# Patient Record
Sex: FEMALE | Race: OTHER | Employment: OTHER | URBAN - METROPOLITAN AREA
[De-identification: names, ages, dates, MRNs, and addresses within clinical notes are randomized per-mention and may not be internally consistent; named-entity substitution may affect disease eponyms.]

---

## 2017-01-13 ENCOUNTER — LAB ENCOUNTER (OUTPATIENT)
Dept: LAB | Age: 82
End: 2017-01-13
Attending: INTERNAL MEDICINE
Payer: MEDICARE

## 2017-01-13 ENCOUNTER — PRIOR ORIGINAL RECORDS (OUTPATIENT)
Dept: OTHER | Age: 82
End: 2017-01-13

## 2017-01-13 DIAGNOSIS — E78.00 PURE HYPERCHOLESTEROLEMIA: ICD-10-CM

## 2017-01-13 DIAGNOSIS — I10 ESSENTIAL HYPERTENSION: Primary | ICD-10-CM

## 2017-01-13 LAB
ALBUMIN SERPL-MCNC: 3.7 G/DL (ref 3.5–4.8)
ALP LIVER SERPL-CCNC: 116 U/L (ref 55–142)
ALT SERPL-CCNC: 17 U/L (ref 14–54)
AST SERPL-CCNC: 19 U/L (ref 15–41)
BILIRUB SERPL-MCNC: 0.7 MG/DL (ref 0.1–2)
BUN BLD-MCNC: 22 MG/DL (ref 8–20)
CALCIUM BLD-MCNC: 8.9 MG/DL (ref 8.3–10.3)
CHLORIDE: 106 MMOL/L (ref 101–111)
CHOLEST SMN-MCNC: 164 MG/DL (ref ?–200)
CO2: 28 MMOL/L (ref 22–32)
CREAT BLD-MCNC: 1.06 MG/DL (ref 0.55–1.02)
GLUCOSE BLD-MCNC: 87 MG/DL (ref 70–99)
HDLC SERPL-MCNC: 55 MG/DL (ref 45–?)
HDLC SERPL: 2.98 {RATIO} (ref ?–4.44)
LDLC SERPL CALC-MCNC: 79 MG/DL (ref ?–130)
M PROTEIN MFR SERPL ELPH: 7.2 G/DL (ref 6.1–8.3)
NONHDLC SERPL-MCNC: 109 MG/DL (ref ?–130)
POTASSIUM SERPL-SCNC: 3.4 MMOL/L (ref 3.6–5.1)
SODIUM SERPL-SCNC: 143 MMOL/L (ref 136–144)
TRIGLYCERIDES: 149 MG/DL (ref ?–150)
VLDL: 30 MG/DL (ref 5–40)

## 2017-01-13 PROCEDURE — 80053 COMPREHEN METABOLIC PANEL: CPT | Performed by: INTERNAL MEDICINE

## 2017-01-13 PROCEDURE — 80061 LIPID PANEL: CPT | Performed by: INTERNAL MEDICINE

## 2017-01-13 PROCEDURE — 36415 COLL VENOUS BLD VENIPUNCTURE: CPT | Performed by: INTERNAL MEDICINE

## 2017-01-21 ENCOUNTER — HOSPITAL ENCOUNTER (OUTPATIENT)
Dept: MAMMOGRAPHY | Age: 82
Discharge: HOME | End: 2017-01-21
Attending: FAMILY MEDICINE
Payer: MEDICARE

## 2017-01-21 DIAGNOSIS — Z12.31 VISIT FOR SCREENING MAMMOGRAM: ICD-10-CM

## 2017-01-21 PROCEDURE — 77063 BREAST TOMOSYNTHESIS BI: CPT

## 2017-01-21 PROCEDURE — 77067 SCR MAMMO BI INCL CAD: CPT

## 2017-01-24 ENCOUNTER — PRIOR ORIGINAL RECORDS (OUTPATIENT)
Dept: OTHER | Age: 82
End: 2017-01-24

## 2017-01-26 LAB
ALBUMIN: 3.7 G/DL
ALKALINE PHOSPHATATE(ALK PHOS): 116 IU/L
BILIRUBIN TOTAL: 0.7 MG/DL
BUN: 22 MG/DL
CALCIUM: 8.9 MG/DL
CHLORIDE: 106 MEQ/L
CHOLESTEROL, TOTAL: 164 MG/DL
CREATININE, SERUM: 1.06 MG/DL
GLUCOSE: 87 MG/DL
HDL CHOLESTEROL: 55 MG/DL
LDL CHOLESTEROL: 79 MG/DL
POTASSIUM, SERUM: 3.4 MEQ/L
PROTEIN, TOTAL: 7.2 G/DL
SGOT (AST): 19 IU/L
SGPT (ALT): 17 IU/L
SODIUM: 143 MEQ/L
TRIGLYCERIDES: 149 MG/DL

## 2017-02-03 ENCOUNTER — TELEPHONE (OUTPATIENT)
Dept: FAMILY MEDICINE CLINIC | Facility: CLINIC | Age: 82
End: 2017-02-03

## 2017-02-03 ENCOUNTER — HOSPITAL ENCOUNTER (OUTPATIENT)
Dept: MAMMOGRAPHY | Facility: HOSPITAL | Age: 82
Discharge: HOME | End: 2017-02-03
Attending: FAMILY MEDICINE
Payer: MEDICARE

## 2017-02-03 DIAGNOSIS — R92.8 ABNORMAL MAMMOGRAM OF RIGHT BREAST: ICD-10-CM

## 2017-02-03 DIAGNOSIS — R92.1 BREAST CALCIFICATIONS ON MAMMOGRAM: Primary | ICD-10-CM

## 2017-02-03 PROCEDURE — 77065 DX MAMMO INCL CAD UNI: CPT

## 2017-02-03 NOTE — IMAGING NOTE
Assisted Dr. Ramiro Cuba with recommendation for a sterotactic breast biopsy for calcifications. Emotional and educational support provided. Written information provided to Christiano Pittman.  Pt verbalized understanding and had no further questions at this ti

## 2017-02-08 ENCOUNTER — HOSPITAL ENCOUNTER (OUTPATIENT)
Dept: MAMMOGRAPHY | Facility: HOSPITAL | Age: 82
Discharge: HOME | End: 2017-02-08
Attending: FAMILY MEDICINE
Payer: MEDICARE

## 2017-02-08 DIAGNOSIS — R92.1 BREAST CALCIFICATIONS ON MAMMOGRAM: ICD-10-CM

## 2017-02-08 PROCEDURE — 88305 TISSUE EXAM BY PATHOLOGIST: CPT | Performed by: FAMILY MEDICINE

## 2017-02-08 PROCEDURE — 88344 IMHCHEM/IMCYTCHM EA MLT ANTB: CPT | Performed by: FAMILY MEDICINE

## 2017-02-08 PROCEDURE — 19081 BX BREAST 1ST LESION STRTCTC: CPT

## 2017-02-14 ENCOUNTER — TELEPHONE (OUTPATIENT)
Dept: MAMMOGRAPHY | Facility: HOSPITAL | Age: 82
End: 2017-02-14

## 2017-02-14 NOTE — TELEPHONE ENCOUNTER
Call placed to pt home number at this time, Noemi Garrido verified name and . Noemi Garrido notified of normal breast biopsy result at this time, negative for atypia or malignancy. Pt should f/u in 6 months for surveillance.  [address] [facility] [place]

## 2017-03-01 ENCOUNTER — OFFICE VISIT (OUTPATIENT)
Dept: FAMILY MEDICINE CLINIC | Facility: CLINIC | Age: 82
End: 2017-03-01

## 2017-03-01 VITALS
HEART RATE: 56 BPM | RESPIRATION RATE: 16 BRPM | DIASTOLIC BLOOD PRESSURE: 64 MMHG | HEIGHT: 61.5 IN | SYSTOLIC BLOOD PRESSURE: 126 MMHG | TEMPERATURE: 98 F | BODY MASS INDEX: 32.24 KG/M2 | WEIGHT: 173 LBS

## 2017-03-01 DIAGNOSIS — L21.9 SEBORRHEIC DERMATITIS OF SCALP: ICD-10-CM

## 2017-03-01 DIAGNOSIS — S83.521A SPRAIN OF POSTERIOR CRUCIATE LIGAMENT OF RIGHT KNEE, INITIAL ENCOUNTER: Primary | ICD-10-CM

## 2017-03-01 PROCEDURE — 99213 OFFICE O/P EST LOW 20 MIN: CPT | Performed by: FAMILY MEDICINE

## 2017-03-01 RX ORDER — CLOBETASOL PROPIONATE 0.05 G/100ML
SHAMPOO TOPICAL
Qty: 240 ML | Refills: 0 | Status: SHIPPED | OUTPATIENT
Start: 2017-03-01 | End: 2017-05-18

## 2017-03-01 NOTE — PROGRESS NOTES
Slipped on black ice in late December. Legs ended up hyperextended as she fell. Used her back to keep her head from hitting the ground. Was able to pick herself up using the bumper of a car. Drove herself home.   Has been suffering with some pain in the Oral Tab Take 1 tablet (40 mg total) by mouth nightly. Disp: 90 tablet Rfl: 3   hydrochlorothiazide 25 MG Oral Tab Take 1 tablet (25 mg total) by mouth daily.  Disp: 90 tablet Rfl: 3     ALLERGIES:   Amoxicillin; Codeine; Demerol; Zetia  FAMILY HISTORY  No

## 2017-03-01 NOTE — PATIENT INSTRUCTIONS
Posterior cruciate ligament strain after the fall. Continue with exercises. If not better in another month, we could consider MRI. Unlikely to be Baker's cyst as you do not have any swelling or lump in the back of the knee.     use shampoo daily for the

## 2017-03-02 ENCOUNTER — TELEPHONE (OUTPATIENT)
Dept: FAMILY MEDICINE CLINIC | Facility: CLINIC | Age: 82
End: 2017-03-02

## 2017-03-03 ENCOUNTER — TELEPHONE (OUTPATIENT)
Dept: FAMILY MEDICINE CLINIC | Facility: CLINIC | Age: 82
End: 2017-03-03

## 2017-03-03 RX ORDER — CLOBETASOL PROPIONATE 0.46 MG/ML
1 SOLUTION TOPICAL 2 TIMES DAILY
Qty: 50 ML | Refills: 0 | Status: SHIPPED | OUTPATIENT
Start: 2017-03-03 | End: 2018-06-26 | Stop reason: ALTCHOICE

## 2017-03-03 NOTE — TELEPHONE ENCOUNTER
PA denied for Clobetasol shampoo as pt's insurance requires failure on 2 other drugs from preferred list or clobetasol 0.05% in cream,gel,ointment or solution. ..change form?

## 2017-05-13 ENCOUNTER — OFFICE VISIT (OUTPATIENT)
Dept: FAMILY MEDICINE CLINIC | Facility: CLINIC | Age: 82
End: 2017-05-13

## 2017-05-13 VITALS
TEMPERATURE: 98 F | HEART RATE: 76 BPM | HEIGHT: 61.5 IN | OXYGEN SATURATION: 98 % | BODY MASS INDEX: 32.24 KG/M2 | WEIGHT: 173 LBS | RESPIRATION RATE: 20 BRPM | SYSTOLIC BLOOD PRESSURE: 124 MMHG | DIASTOLIC BLOOD PRESSURE: 66 MMHG

## 2017-05-13 DIAGNOSIS — R09.82 POST-NASAL DRIP: ICD-10-CM

## 2017-05-13 DIAGNOSIS — B37.0 ORAL CANDIDA: ICD-10-CM

## 2017-05-13 DIAGNOSIS — J02.9 SORE THROAT: Primary | ICD-10-CM

## 2017-05-13 DIAGNOSIS — R05.9 COUGH: ICD-10-CM

## 2017-05-13 PROCEDURE — 99213 OFFICE O/P EST LOW 20 MIN: CPT | Performed by: NURSE PRACTITIONER

## 2017-05-13 PROCEDURE — 87880 STREP A ASSAY W/OPTIC: CPT | Performed by: NURSE PRACTITIONER

## 2017-05-13 RX ORDER — BENZONATATE 200 MG/1
200 CAPSULE ORAL 3 TIMES DAILY PRN
Qty: 30 CAPSULE | Refills: 0 | Status: SHIPPED | OUTPATIENT
Start: 2017-05-13 | End: 2017-05-23

## 2017-05-13 NOTE — PROGRESS NOTES
HPI:   Sandy Chase is a 81year old female who presents with ill symptoms for  4  days.  Patient reports sore throat, congestion, fever with Tmax to 99.2, dry cough, cough is keeping pt up at night, OTC cold meds have not been helping, tried Flonase fo Alcohol Use: Yes                Comment: socially only on Holidays        REVIEW OF SYSTEMS:   GENERAL: feels well otherwise, fatigued with lack of sleep  HEENT:  as above in HPI  LUNGS: denies shortness of breath with exertion, cough non productive  CARDI fullness and post nasal drip. · Start Nystatin swish and swallow-use fifteen minutes before meals and use at bedtime AFTER brushing teeth. · If Flonase is used, rinse mouth-avoid if bothersome as this is worsening nasal dryness.  May use Saline nasal spr

## 2017-05-13 NOTE — PATIENT INSTRUCTIONS
· Please start Benzonatate (Tessalon) capsule three times daily as needed for cough. Do not break or puncture capsule. Take with full glass of water as needed. If not working for cough may stop after three or four doses.   · Start Allegra (single ingredient

## 2017-05-17 PROBLEM — I77.9 CAROTID ARTERY DISEASE: Status: ACTIVE | Noted: 2017-05-17

## 2017-05-17 PROBLEM — I77.9 CAROTID ARTERY DISEASE (HCC): Status: ACTIVE | Noted: 2017-05-17

## 2017-05-18 ENCOUNTER — OFFICE VISIT (OUTPATIENT)
Dept: FAMILY MEDICINE CLINIC | Facility: CLINIC | Age: 82
End: 2017-05-18

## 2017-05-18 VITALS
DIASTOLIC BLOOD PRESSURE: 70 MMHG | OXYGEN SATURATION: 100 % | BODY MASS INDEX: 32.24 KG/M2 | RESPIRATION RATE: 18 BRPM | SYSTOLIC BLOOD PRESSURE: 130 MMHG | WEIGHT: 173 LBS | HEART RATE: 55 BPM | TEMPERATURE: 98 F | HEIGHT: 61.5 IN

## 2017-05-18 DIAGNOSIS — B37.0 THRUSH, ORAL: ICD-10-CM

## 2017-05-18 DIAGNOSIS — J06.9 VIRAL UPPER RESPIRATORY TRACT INFECTION: Primary | ICD-10-CM

## 2017-05-18 PROCEDURE — 99213 OFFICE O/P EST LOW 20 MIN: CPT | Performed by: FAMILY MEDICINE

## 2017-05-19 NOTE — PROGRESS NOTES
Here to follow-up from the urgent care. Saturday she was started on Tessalon Perles for cough secondary to postnasal drip. She has an effusion in her ear. She still feels like there is fluid there.   She was diagnosed with oral thrush likely related to t Wt 173 lb  BMI 32.16 kg/m2  SpO2 100%    Alert no acute distress. Ear canals and tympanic membranes are clear. Nose is clear. Mouth no erythema. No plaques on the tongue. Neck without lymphadenopathy. Lungs are clear.     Assessment URI causing cough

## 2017-05-26 ENCOUNTER — TELEPHONE (OUTPATIENT)
Dept: FAMILY MEDICINE CLINIC | Facility: CLINIC | Age: 82
End: 2017-05-26

## 2017-05-26 ENCOUNTER — HOSPITAL ENCOUNTER (OUTPATIENT)
Dept: GENERAL RADIOLOGY | Age: 82
Discharge: HOME | End: 2017-05-26
Attending: FAMILY MEDICINE
Payer: MEDICARE

## 2017-05-26 DIAGNOSIS — M25.561 ACUTE PAIN OF RIGHT KNEE: Primary | ICD-10-CM

## 2017-05-26 DIAGNOSIS — N60.12 FIBROCYSTIC BREAST CHANGES OF BOTH BREASTS: ICD-10-CM

## 2017-05-26 DIAGNOSIS — M17.11 PRIMARY OSTEOARTHRITIS OF RIGHT KNEE: Primary | ICD-10-CM

## 2017-05-26 DIAGNOSIS — M25.561 ACUTE PAIN OF RIGHT KNEE: ICD-10-CM

## 2017-05-26 DIAGNOSIS — N60.11 FIBROCYSTIC BREAST CHANGES OF BOTH BREASTS: ICD-10-CM

## 2017-05-26 PROCEDURE — 73560 X-RAY EXAM OF KNEE 1 OR 2: CPT | Performed by: FAMILY MEDICINE

## 2017-05-26 RX ORDER — NAPROXEN 500 MG/1
500 TABLET ORAL 2 TIMES DAILY
Qty: 28 TABLET | Refills: 0 | Status: SHIPPED | OUTPATIENT
Start: 2017-05-26 | End: 2018-11-15 | Stop reason: ALTCHOICE

## 2017-05-26 NOTE — TELEPHONE ENCOUNTER
Left message on machine. X-rays show significant osteoarthritis. I have sent the medication to her pharmacy. I will see her in follow-up next week. At that time we can discuss supplements such as glucosamine/chondroitin for long-term relief.

## 2017-05-26 NOTE — TELEPHONE ENCOUNTER
Here with her  for his visit. Knee pain radiating down the shin. X-ray ordered. Mammogram ordered for six-month follow-up. This is to be done in July. She will make a follow-up visit for the knee next week.

## 2017-05-30 RX ORDER — NAPROXEN 500 MG/1
TABLET ORAL
Qty: 180 TABLET | Refills: 0 | OUTPATIENT
Start: 2017-05-30

## 2017-06-02 ENCOUNTER — OFFICE VISIT (OUTPATIENT)
Dept: FAMILY MEDICINE CLINIC | Facility: CLINIC | Age: 82
End: 2017-06-02

## 2017-06-02 VITALS
HEIGHT: 61.5 IN | WEIGHT: 172 LBS | BODY MASS INDEX: 32.06 KG/M2 | DIASTOLIC BLOOD PRESSURE: 78 MMHG | OXYGEN SATURATION: 98 % | RESPIRATION RATE: 20 BRPM | HEART RATE: 79 BPM | SYSTOLIC BLOOD PRESSURE: 130 MMHG

## 2017-06-02 DIAGNOSIS — M17.11 PRIMARY OSTEOARTHRITIS OF RIGHT KNEE: Primary | ICD-10-CM

## 2017-06-02 PROCEDURE — 99213 OFFICE O/P EST LOW 20 MIN: CPT | Performed by: FAMILY MEDICINE

## 2017-06-02 RX ORDER — MAGNESIUM HYDROXIDE 400 MG/5ML
SUSPENSION, ORAL (FINAL DOSE FORM) ORAL
Qty: 60 CAPSULE | Refills: 0 | COMMUNITY
Start: 2017-06-02 | End: 2017-11-07

## 2017-06-02 NOTE — PATIENT INSTRUCTIONS
Exercise will help to keep the arthritis pain at bay. You are going to try glucosamine and chondroitin 500 mg twice a day. Reassess the pain in 2 months. Continue with helping. Go ahead and use the naproxen as needed.     Treatment options include s

## 2017-06-02 NOTE — PROGRESS NOTES
At her 's visit last week we discussed arthritis of the knee. X-ray was performed and confirms moderate to severe osteoarthritis.   We reviewed the x-rays this afternoon which show scar tissue in the medial compartment and a large spur in the latera

## 2017-07-28 ENCOUNTER — LAB ENCOUNTER (OUTPATIENT)
Dept: LAB | Age: 82
End: 2017-07-28
Attending: INTERNAL MEDICINE
Payer: MEDICARE

## 2017-07-28 ENCOUNTER — PRIOR ORIGINAL RECORDS (OUTPATIENT)
Dept: OTHER | Age: 82
End: 2017-07-28

## 2017-07-28 DIAGNOSIS — R94.31 NONSPECIFIC ABNORMAL ELECTROCARDIOGRAM (ECG) (EKG): ICD-10-CM

## 2017-07-28 DIAGNOSIS — I10 ESSENTIAL HYPERTENSION, MALIGNANT: Primary | ICD-10-CM

## 2017-07-28 DIAGNOSIS — E78.00 PURE HYPERCHOLESTEROLEMIA: ICD-10-CM

## 2017-07-28 LAB
ALBUMIN SERPL-MCNC: 3.4 G/DL (ref 3.5–4.8)
ALP LIVER SERPL-CCNC: 93 U/L (ref 55–142)
ALT SERPL-CCNC: 17 U/L (ref 14–54)
AST SERPL-CCNC: 15 U/L (ref 15–41)
BILIRUB SERPL-MCNC: 0.7 MG/DL (ref 0.1–2)
BUN BLD-MCNC: 24 MG/DL (ref 8–20)
CALCIUM BLD-MCNC: 8.9 MG/DL (ref 8.3–10.3)
CHLORIDE: 106 MMOL/L (ref 101–111)
CHOLEST SMN-MCNC: 166 MG/DL (ref ?–200)
CO2: 29 MMOL/L (ref 22–32)
CREAT BLD-MCNC: 1.02 MG/DL (ref 0.55–1.02)
GLUCOSE BLD-MCNC: 92 MG/DL (ref 70–99)
HDLC SERPL-MCNC: 53 MG/DL (ref 45–?)
HDLC SERPL: 3.13 {RATIO} (ref ?–4.44)
LDLC SERPL CALC-MCNC: 80 MG/DL (ref ?–130)
LDLC SERPL-MCNC: 33 MG/DL (ref 5–40)
M PROTEIN MFR SERPL ELPH: 7 G/DL (ref 6.1–8.3)
NONHDLC SERPL-MCNC: 113 MG/DL (ref ?–130)
POTASSIUM SERPL-SCNC: 3.5 MMOL/L (ref 3.6–5.1)
SODIUM SERPL-SCNC: 145 MMOL/L (ref 136–144)
TRIGLYCERIDES: 164 MG/DL (ref ?–150)

## 2017-07-28 PROCEDURE — 80061 LIPID PANEL: CPT

## 2017-07-28 PROCEDURE — 80053 COMPREHEN METABOLIC PANEL: CPT

## 2017-07-28 PROCEDURE — 36415 COLL VENOUS BLD VENIPUNCTURE: CPT

## 2017-07-31 ENCOUNTER — PRIOR ORIGINAL RECORDS (OUTPATIENT)
Dept: OTHER | Age: 82
End: 2017-07-31

## 2017-08-01 LAB
ALBUMIN: 3.4 G/DL
ALKALINE PHOSPHATATE(ALK PHOS): 93 IU/L
BILIRUBIN TOTAL: 0.7 MG/DL
BUN: 24 MG/DL
CALCIUM: 8.9 MG/DL
CHLORIDE: 106 MEQ/L
CHOLESTEROL, TOTAL: 166 MG/DL
CREATININE, SERUM: 1.02 MG/DL
GLUCOSE: 92 MG/DL
HDL CHOLESTEROL: 53 MG/DL
LDL CHOLESTEROL: 80 MG/DL
POTASSIUM, SERUM: 3.5 MEQ/L
PROTEIN, TOTAL: 7 G/DL
SGOT (AST): 15 IU/L
SGPT (ALT): 17 IU/L
SODIUM: 145 MEQ/L
TRIGLYCERIDES: 164 MG/DL

## 2017-08-07 ENCOUNTER — HOSPITAL ENCOUNTER (OUTPATIENT)
Dept: MAMMOGRAPHY | Facility: HOSPITAL | Age: 82
Discharge: HOME | End: 2017-08-07
Attending: FAMILY MEDICINE
Payer: MEDICARE

## 2017-08-07 DIAGNOSIS — N60.12 FIBROCYSTIC BREAST CHANGES OF BOTH BREASTS: ICD-10-CM

## 2017-08-07 DIAGNOSIS — N60.11 FIBROCYSTIC BREAST CHANGES OF BOTH BREASTS: ICD-10-CM

## 2017-08-07 PROCEDURE — 77062 BREAST TOMOSYNTHESIS BI: CPT | Performed by: FAMILY MEDICINE

## 2017-08-07 PROCEDURE — 77066 DX MAMMO INCL CAD BI: CPT | Performed by: FAMILY MEDICINE

## 2017-11-07 ENCOUNTER — OFFICE VISIT (OUTPATIENT)
Dept: FAMILY MEDICINE CLINIC | Facility: CLINIC | Age: 82
End: 2017-11-07

## 2017-11-07 VITALS
OXYGEN SATURATION: 98 % | BODY MASS INDEX: 33.04 KG/M2 | SYSTOLIC BLOOD PRESSURE: 150 MMHG | HEART RATE: 67 BPM | HEIGHT: 61 IN | WEIGHT: 175 LBS | RESPIRATION RATE: 18 BRPM | TEMPERATURE: 98 F | DIASTOLIC BLOOD PRESSURE: 80 MMHG

## 2017-11-07 DIAGNOSIS — Z00.00 ENCOUNTER FOR ANNUAL HEALTH EXAMINATION: Primary | ICD-10-CM

## 2017-11-07 DIAGNOSIS — I10 ESSENTIAL HYPERTENSION WITH GOAL BLOOD PRESSURE LESS THAN 140/90: ICD-10-CM

## 2017-11-07 DIAGNOSIS — E78.00 PURE HYPERCHOLESTEROLEMIA: ICD-10-CM

## 2017-11-07 PROCEDURE — G0439 PPPS, SUBSEQ VISIT: HCPCS | Performed by: FAMILY MEDICINE

## 2017-11-07 RX ORDER — HYDROCHLOROTHIAZIDE 25 MG/1
25 TABLET ORAL DAILY
Qty: 90 TABLET | Refills: 3 | Status: SHIPPED | OUTPATIENT
Start: 2017-11-07 | End: 2018-05-14

## 2017-11-07 RX ORDER — ATORVASTATIN CALCIUM 40 MG/1
40 TABLET, FILM COATED ORAL NIGHTLY
Qty: 90 TABLET | Refills: 3 | Status: SHIPPED | OUTPATIENT
Start: 2017-11-07 | End: 2018-11-15

## 2017-11-07 NOTE — PATIENT INSTRUCTIONS
Jinny Ocampo's SCREENING SCHEDULE   Tests on this list are recommended by your physician but may not be covered, or covered at this frequency, by your insurer. Please check with your insurance carrier before scheduling to verify coverage.    ZOFIA aneurysm screening (once between ages 73-68) IPPE only No results found for this or any previous visit.  Limited to patients who meet one of the following criteria:   • Men who are 73-68 years old and have smoked more than 100 cigarettes in their lifetime at least age 76, and as needed after 76 There are no preventive care reminders to display for this patient. Please get this Mammogram regularly   Immunizations      Influenza  Covered Annually No orders found for this or any previous visit.  Please get ever

## 2017-11-07 NOTE — PROGRESS NOTES
HPI:   Kathryn Wiggins is a 81year old female who presents for a Medicare Subsequent Annual Wellness visit (Pt already had Initial Annual Wellness). No complaints. Has a nuclear stress test in December.   Will follow up with Dr. Millie Youngblood in January (6/25/2015); Arthritis; Cataracts, bilateral (2015); Other and unspecified hyperlipidemia; Plantar fascial fibromatosis; PONV (postoperative nausea and vomiting); and Unspecified essential hypertension.     She  has a past surgical history that includes bio Hearing Assessment  (Required for AWV/SWV)    Hearing Screening    Screening Method:  Tuning Fork  Tuning Fork Result:  Pass          Visual Acuity  Right Eye Visual Acuity: Corrected Right Eye Chart Acuity: 20/25   Left Eye Visual Acuity: Corrected Left E Living Will on file in [address] [place] [address] Rd.  The patient has this document but we do not have it in [facility], and patient is instructed to get our office a copy of it for scanning into [address] [place] [address] on file in [facility]:    Shaye does not have Fall/Risk Scorin          Depression Screening (PHQ-2/PHQ-9): Over the LAST 2 WEEKS   Little interest or pleasure in doing things (over the last two weeks)?: Not at all    Feeling down, depressed, or hopeless (over the last two weeks)?: Not at all Screening      Ophthalmology Visit Annually: Diabetics, FHx Glaucoma, AA>50, > 65 No flowsheet data found. Bone Density Screening      Dexascan Every two years No results found for this or any previous visit. No flowsheet data found.     Pap and anticonvulsants.)    Potassium  Annually Potassium (mmol/L)   Date Value   07/28/2017 3.5 (L)     POTASSIUM (mmol/L)   Date Value   06/05/2014 3.7   03/16/2012 3.5 (L)   12/29/2011 3.5    No flowsheet data found.     Creatinine  Annually CREATININE (mg/dL)

## 2017-11-17 ENCOUNTER — HOSPITAL ENCOUNTER (OUTPATIENT)
Dept: CV DIAGNOSTICS | Facility: HOSPITAL | Age: 82
Discharge: HOME | End: 2017-11-17
Attending: INTERNAL MEDICINE
Payer: MEDICARE

## 2017-11-17 DIAGNOSIS — R94.31 NONSPECIFIC ABNORMAL ELECTROCARDIOGRAM (ECG) (EKG): ICD-10-CM

## 2017-11-17 DIAGNOSIS — I10 ESSENTIAL HYPERTENSION, MALIGNANT: ICD-10-CM

## 2017-11-17 PROCEDURE — 93017 CV STRESS TEST TRACING ONLY: CPT | Performed by: INTERNAL MEDICINE

## 2017-11-17 PROCEDURE — 93018 CV STRESS TEST I&R ONLY: CPT | Performed by: INTERNAL MEDICINE

## 2017-11-17 PROCEDURE — 78452 HT MUSCLE IMAGE SPECT MULT: CPT | Performed by: INTERNAL MEDICINE

## 2017-11-29 ENCOUNTER — HOSPITAL ENCOUNTER (OUTPATIENT)
Dept: CV DIAGNOSTICS | Age: 82
Discharge: HOME | End: 2017-11-29
Attending: INTERNAL MEDICINE
Payer: MEDICARE

## 2017-11-29 ENCOUNTER — MYAURORA ACCOUNT LINK (OUTPATIENT)
Dept: OTHER | Age: 82
End: 2017-11-29

## 2017-11-29 DIAGNOSIS — R94.31 NONSPECIFIC ABNORMAL ELECTROCARDIOGRAM (ECG) (EKG): ICD-10-CM

## 2017-11-29 DIAGNOSIS — I35.0 NONRHEUMATIC AORTIC VALVE STENOSIS: ICD-10-CM

## 2017-11-29 DIAGNOSIS — I10 BENIGN HYPERTENSION: ICD-10-CM

## 2017-12-12 ENCOUNTER — PRIOR ORIGINAL RECORDS (OUTPATIENT)
Dept: OTHER | Age: 82
End: 2017-12-12

## 2017-12-28 ENCOUNTER — MYAURORA ACCOUNT LINK (OUTPATIENT)
Dept: OTHER | Age: 82
End: 2017-12-28

## 2017-12-28 ENCOUNTER — HOSPITAL ENCOUNTER (OUTPATIENT)
Dept: CARDIOLOGY CLINIC | Facility: HOSPITAL | Age: 82
Discharge: HOME | End: 2017-12-28
Attending: INTERNAL MEDICINE

## 2017-12-28 DIAGNOSIS — I73.9 CLAUDICATION (HCC): ICD-10-CM

## 2018-01-02 ENCOUNTER — PRIOR ORIGINAL RECORDS (OUTPATIENT)
Dept: OTHER | Age: 83
End: 2018-01-02

## 2018-01-05 ENCOUNTER — PRIOR ORIGINAL RECORDS (OUTPATIENT)
Dept: OTHER | Age: 83
End: 2018-01-05

## 2018-01-11 ENCOUNTER — PRIOR ORIGINAL RECORDS (OUTPATIENT)
Dept: OTHER | Age: 83
End: 2018-01-11

## 2018-01-17 ENCOUNTER — MYAURORA ACCOUNT LINK (OUTPATIENT)
Dept: OTHER | Age: 83
End: 2018-01-17

## 2018-01-17 ENCOUNTER — PRIOR ORIGINAL RECORDS (OUTPATIENT)
Dept: OTHER | Age: 83
End: 2018-01-17

## 2018-05-14 ENCOUNTER — TELEPHONE (OUTPATIENT)
Dept: FAMILY MEDICINE CLINIC | Facility: CLINIC | Age: 83
End: 2018-05-14

## 2018-05-14 DIAGNOSIS — N60.11 FIBROCYSTIC DISEASE OF RIGHT BREAST: Primary | ICD-10-CM

## 2018-05-14 DIAGNOSIS — N60.12 FIBROCYSTIC DISEASE OF LEFT BREAST: ICD-10-CM

## 2018-05-14 RX ORDER — HYDROCHLOROTHIAZIDE 12.5 MG/1
12.5 TABLET ORAL DAILY
Qty: 90 TABLET | Refills: 3 | Status: SHIPPED | OUTPATIENT
Start: 2018-05-14 | End: 2018-11-15 | Stop reason: ALTCHOICE

## 2018-05-14 NOTE — TELEPHONE ENCOUNTER
Pt stated this is the 1st time she starting taking the hydrochlorothiazide 25mg which is giving her side effects such as dizziness at all times, pt wants to know if the dosage can be cut in half to 12.5mg and if it can be change to the brand she usually ta

## 2018-06-06 ENCOUNTER — LAB ENCOUNTER (OUTPATIENT)
Dept: LAB | Age: 83
End: 2018-06-06
Attending: INTERNAL MEDICINE
Payer: MEDICARE

## 2018-06-06 ENCOUNTER — PRIOR ORIGINAL RECORDS (OUTPATIENT)
Dept: OTHER | Age: 83
End: 2018-06-06

## 2018-06-06 DIAGNOSIS — E78.00 PURE HYPERCHOLESTEROLEMIA: Primary | ICD-10-CM

## 2018-06-06 DIAGNOSIS — I10 ESSENTIAL HYPERTENSION, MALIGNANT: ICD-10-CM

## 2018-06-06 PROCEDURE — 80053 COMPREHEN METABOLIC PANEL: CPT

## 2018-06-06 PROCEDURE — 80061 LIPID PANEL: CPT

## 2018-06-06 PROCEDURE — 36415 COLL VENOUS BLD VENIPUNCTURE: CPT

## 2018-06-07 ENCOUNTER — TELEPHONE (OUTPATIENT)
Dept: OBGYN CLINIC | Facility: CLINIC | Age: 83
End: 2018-06-07

## 2018-06-07 ENCOUNTER — TELEPHONE (OUTPATIENT)
Dept: FAMILY MEDICINE CLINIC | Facility: CLINIC | Age: 83
End: 2018-06-07

## 2018-06-07 LAB
ALKALINE PHOSPHATATE(ALK PHOS): 117 IU/L
BILIRUBIN TOTAL: 0.7 MG/DL
BUN: 18 MG/DL
CALCIUM: 9 MG/DL
CHLORIDE: 108 MEQ/L
CHOLESTEROL, TOTAL: 157 MG/DL
CREATININE, SERUM: 1.13 MG/DL
GLUCOSE: 100 MG/DL
HDL CHOLESTEROL: 47 MG/DL
LDL CHOLESTEROL: 77 MG/DL
POTASSIUM, SERUM: 4 MEQ/L
PROTEIN, TOTAL: 7.3 G/DL
SGOT (AST): 21 IU/L
SGPT (ALT): 17 IU/L
SODIUM: 145 MEQ/L
TRIGLYCERIDES: 165 MG/DL

## 2018-06-07 NOTE — TELEPHONE ENCOUNTER
Patient was referred to our office by Dr. Savage Amado. She states for the last month she has had bumps on her vagina. Tried using OTC medication with no relief. She states now they look and feel like a wart. Denies any other s/s.    [facility]

## 2018-06-07 NOTE — TELEPHONE ENCOUNTER
Pt called to request to please ask ross Wilkins if he can please recommend or give pt a referral for a gynecologist. Please call and advise.

## 2018-06-12 ENCOUNTER — OFFICE VISIT (OUTPATIENT)
Dept: OBGYN CLINIC | Facility: CLINIC | Age: 83
End: 2018-06-12

## 2018-06-12 ENCOUNTER — PRIOR ORIGINAL RECORDS (OUTPATIENT)
Dept: OTHER | Age: 83
End: 2018-06-12

## 2018-06-12 VITALS
BODY MASS INDEX: 33.04 KG/M2 | HEIGHT: 61 IN | WEIGHT: 175 LBS | DIASTOLIC BLOOD PRESSURE: 70 MMHG | SYSTOLIC BLOOD PRESSURE: 148 MMHG

## 2018-06-12 DIAGNOSIS — N90.7 EPIDERMOID CYST OF VULVA: Primary | ICD-10-CM

## 2018-06-12 PROCEDURE — 99203 OFFICE O/P NEW LOW 30 MIN: CPT | Performed by: NURSE PRACTITIONER

## 2018-06-12 NOTE — PROGRESS NOTES
S:  Patient was referred here to discuss the recent bumps she noticed on her labia. She has noticed a few in the last month, they are not painful, but she states some discomfort when she wipes at times.  She does not think they have changed in size, no ai

## 2018-06-26 ENCOUNTER — OFFICE VISIT (OUTPATIENT)
Dept: OBGYN CLINIC | Facility: CLINIC | Age: 83
End: 2018-06-26

## 2018-06-26 VITALS
BODY MASS INDEX: 33.19 KG/M2 | WEIGHT: 175.81 LBS | DIASTOLIC BLOOD PRESSURE: 72 MMHG | SYSTOLIC BLOOD PRESSURE: 112 MMHG | HEIGHT: 61 IN

## 2018-06-26 DIAGNOSIS — N90.7 EPIDERMOID CYST OF VULVA: Primary | ICD-10-CM

## 2018-06-26 PROCEDURE — 99213 OFFICE O/P EST LOW 20 MIN: CPT | Performed by: NURSE PRACTITIONER

## 2018-06-26 NOTE — PROGRESS NOTES
S:  Patient was seen 6/12/2018 for bumps she noticed on the external genitalia. They were uncomfortable at times, no drainage. She has been doing sitz baths and warm compress and feels they have improved significantly. There is no pain or drainage noted.

## 2018-07-03 ENCOUNTER — OFFICE VISIT (OUTPATIENT)
Dept: FAMILY MEDICINE CLINIC | Facility: CLINIC | Age: 83
End: 2018-07-03

## 2018-07-03 VITALS
SYSTOLIC BLOOD PRESSURE: 146 MMHG | BODY MASS INDEX: 33.11 KG/M2 | OXYGEN SATURATION: 98 % | RESPIRATION RATE: 18 BRPM | HEIGHT: 61 IN | HEART RATE: 70 BPM | TEMPERATURE: 98 F | DIASTOLIC BLOOD PRESSURE: 68 MMHG | WEIGHT: 175.38 LBS

## 2018-07-03 DIAGNOSIS — I35.8 AORTIC HEART MURMUR ON EXAMINATION: ICD-10-CM

## 2018-07-03 DIAGNOSIS — I35.0 NONRHEUMATIC AORTIC VALVE STENOSIS: ICD-10-CM

## 2018-07-03 DIAGNOSIS — I10 ESSENTIAL HYPERTENSION: Primary | ICD-10-CM

## 2018-07-03 PROCEDURE — 99213 OFFICE O/P EST LOW 20 MIN: CPT | Performed by: FAMILY MEDICINE

## 2018-07-03 NOTE — PROGRESS NOTES
Here in follow-up for hypertension. She sees cardiology Dr. Zenon Renteria. Her blood pressure there upon arrival was 160 but came down to 129. She is currently on 12.5 mg of hydrochlorothiazide. The higher dose made her urinate frequently.   She does also g regular rate and rhythm normal S1-S2 positive murmur loudest at the right upper sternal border. Extremities 2+ pulses trace edema of the ankles.     Assessment hypertension #2 heart murmur #3 aortic stenosis    Plans continue hydrochlorothiazide 12.5 mg.

## 2018-08-16 ENCOUNTER — HOSPITAL ENCOUNTER (OUTPATIENT)
Dept: MAMMOGRAPHY | Age: 83
Discharge: HOME | End: 2018-08-16
Attending: FAMILY MEDICINE
Payer: MEDICARE

## 2018-08-16 DIAGNOSIS — N60.12 FIBROCYSTIC DISEASE OF LEFT BREAST: ICD-10-CM

## 2018-08-16 DIAGNOSIS — N60.11 FIBROCYSTIC DISEASE OF RIGHT BREAST: ICD-10-CM

## 2018-08-16 PROCEDURE — 77062 BREAST TOMOSYNTHESIS BI: CPT | Performed by: FAMILY MEDICINE

## 2018-08-16 PROCEDURE — 77066 DX MAMMO INCL CAD BI: CPT | Performed by: FAMILY MEDICINE

## 2018-09-24 ENCOUNTER — NURSE ONLY (OUTPATIENT)
Dept: FAMILY MEDICINE CLINIC | Facility: CLINIC | Age: 83
End: 2018-09-24
Payer: MEDICARE

## 2018-09-24 PROCEDURE — 90653 IIV ADJUVANT VACCINE IM: CPT | Performed by: FAMILY MEDICINE

## 2018-09-24 PROCEDURE — G0008 ADMIN INFLUENZA VIRUS VAC: HCPCS | Performed by: FAMILY MEDICINE

## 2018-11-15 ENCOUNTER — OFFICE VISIT (OUTPATIENT)
Dept: FAMILY MEDICINE CLINIC | Facility: CLINIC | Age: 83
End: 2018-11-15
Payer: MEDICARE

## 2018-11-15 VITALS
HEART RATE: 64 BPM | RESPIRATION RATE: 18 BRPM | OXYGEN SATURATION: 98 % | WEIGHT: 175 LBS | SYSTOLIC BLOOD PRESSURE: 154 MMHG | DIASTOLIC BLOOD PRESSURE: 68 MMHG | TEMPERATURE: 98 F | HEIGHT: 61 IN | BODY MASS INDEX: 33.04 KG/M2

## 2018-11-15 DIAGNOSIS — Z00.00 ENCOUNTER FOR ANNUAL HEALTH EXAMINATION: Primary | ICD-10-CM

## 2018-11-15 DIAGNOSIS — E78.00 PURE HYPERCHOLESTEROLEMIA: ICD-10-CM

## 2018-11-15 PROCEDURE — G0439 PPPS, SUBSEQ VISIT: HCPCS | Performed by: FAMILY MEDICINE

## 2018-11-15 RX ORDER — LISINOPRIL 5 MG/1
5 TABLET ORAL DAILY
Qty: 90 TABLET | Refills: 3 | Status: SHIPPED | OUTPATIENT
Start: 2018-11-15 | End: 2019-11-21 | Stop reason: SINTOL

## 2018-11-15 RX ORDER — ATORVASTATIN CALCIUM 40 MG/1
40 TABLET, FILM COATED ORAL NIGHTLY
Qty: 90 TABLET | Refills: 3 | Status: SHIPPED | OUTPATIENT
Start: 2018-11-15 | End: 2019-11-21

## 2018-11-15 RX ORDER — HYDROCHLOROTHIAZIDE 25 MG/1
25 TABLET ORAL DAILY
Qty: 90 TABLET | Refills: 3 | Status: SHIPPED | OUTPATIENT
Start: 2018-11-15 | End: 2019-11-21

## 2018-11-15 NOTE — PROGRESS NOTES
HPI:   Margorie Hatchet is a 81year old female who presents for a Medicare Subsequent Annual Wellness visit (Pt already had Initial Annual Wellness). Denies Dizziness, chest pain, or chest pressure.   She does admit to a headache on the right frontal re Urban Clay MD as Surgeon (SURGERY, GENERAL)  CECELIA Lowe (Nurse Practitioner Women's Health)    Patient Active Problem List:     Osteoarthritis     Pure hypercholesterolemia     Essential hypertension     Esophageal reflux     Cataracts, bilatera femur/knee surg unlisted (1/1/1985); needle biopsy right (2008); OCT, Retina - OS - Left Eye; Breast biopsy (07/22/15); santana needle localization w/ specimen 1 site left (7/2015); santana biopsy stereotactic nodule 2 site bilat (6/2015); santana biopsy stereotactic ASSESSMENT AND OTHER RELEVANT CHRONIC CONDITIONS:   Sterling Garcia is a 81year old female who presents for a Medicare Assessment.      PLAN SUMMARY:   Diagnoses and all orders for this visit:    Encounter for annual health examination  Discussed new (mg/dL)   Date Value   06/05/2014 98     LDL Cholesterol (mg/dL)   Date Value   06/06/2018 77        EKG - w/ Initial Preventative Physical Exam only, or if medically necessary Electrocardiogram date       Colorectal Cancer Screening      Colonoscopy Scree carrier   Homosexual men   Illicit injectable drug abusers     Tetanus Toxoid  Only covered with a cut with metal- TD and TDaP Not covered by Medicare Part B No vaccine history found This may be covered with your prescription benefits, but Medicare does no

## 2018-11-15 NOTE — PATIENT INSTRUCTIONS
Shingrix: New vaccine released in 2018. Prevents shingles 90-95% of the time. Two doses are given between 2 and 6 months apart. Pain at injection site 70-90% of cases. Redness in almost 40%. Swelling in almost 30%.   Also risk of muscle aches over 50%, covered every 5 yrs including Total, LDL and Trigs LDL-CHOLESTEROL (mg/dL (calc))   Date Value   12/29/2011 93     LDL CHOLESTROL (mg/dL)   Date Value   06/05/2014 98     LDL Cholesterol (mg/dL)   Date Value   06/06/2018 77     CHOLESTEROL, TOTAL (mg/dL) 2 yrs after age 72    Covered yearly for Long term Glucocorticoid medication (Steroids) Requires diagnosis related to osteoporosis or estrogen deficiency.    Biennial benefit unless patient has history of long-term glucocorticoid use for medical condition cover unless Medically needed    Zoster (Not covered by Medicare Part B) No orders found for this or any previous visit. This may be covered with your pharmacy  prescription benefits     Recommended Websites for Advanced Directives    [email or web address]

## 2018-11-27 ENCOUNTER — MYAURORA ACCOUNT LINK (OUTPATIENT)
Dept: OTHER | Age: 83
End: 2018-11-27

## 2018-11-27 ENCOUNTER — HOSPITAL ENCOUNTER (OUTPATIENT)
Dept: CV DIAGNOSTICS | Age: 83
Discharge: HOME | End: 2018-11-27
Attending: INTERNAL MEDICINE
Payer: COMMERCIAL

## 2018-11-27 DIAGNOSIS — I35.0 NONRHEUMATIC AORTIC VALVE STENOSIS: ICD-10-CM

## 2018-11-27 DIAGNOSIS — I10 BENIGN HYPERTENSION: ICD-10-CM

## 2018-12-03 ENCOUNTER — PRIOR ORIGINAL RECORDS (OUTPATIENT)
Dept: OTHER | Age: 83
End: 2018-12-03

## 2018-12-21 ENCOUNTER — PRIOR ORIGINAL RECORDS (OUTPATIENT)
Dept: OTHER | Age: 83
End: 2018-12-21

## 2018-12-21 ENCOUNTER — LAB ENCOUNTER (OUTPATIENT)
Dept: LAB | Age: 83
End: 2018-12-21
Attending: INTERNAL MEDICINE
Payer: MEDICARE

## 2018-12-21 DIAGNOSIS — E78.00 PURE HYPERCHOLESTEROLEMIA: Primary | ICD-10-CM

## 2018-12-21 DIAGNOSIS — I10 ESSENTIAL HYPERTENSION, MALIGNANT: ICD-10-CM

## 2018-12-21 PROCEDURE — 80061 LIPID PANEL: CPT

## 2018-12-21 PROCEDURE — 80053 COMPREHEN METABOLIC PANEL: CPT

## 2018-12-21 PROCEDURE — 36415 COLL VENOUS BLD VENIPUNCTURE: CPT

## 2018-12-26 ENCOUNTER — PRIOR ORIGINAL RECORDS (OUTPATIENT)
Dept: OTHER | Age: 83
End: 2018-12-26

## 2018-12-26 LAB
ALBUMIN: 3.5 G/DL
ALKALINE PHOSPHATATE(ALK PHOS): 104 IU/L
BILIRUBIN TOTAL: 0.7 MG/DL
BUN: 21 MG/DL
CHLORIDE: 108 MEQ/L
CHOLESTEROL, TOTAL: 176 MG/DL
CREATININE, SERUM: 1.06 MG/DL
GLOBULIN: 3.5 G/DL
GLUCOSE: 94 MG/DL
HDL CHOLESTEROL: 49 MG/DL
LDL CHOLESTEROL: 91 MG/DL
NON-HDL CHOLESTEROL: 127 MG/DL
POTASSIUM, SERUM: 3.7 MEQ/L
PROTEIN, TOTAL: 7 G/DL
SGOT (AST): 19 IU/L
SGPT (ALT): 14 IU/L
SODIUM: 142 MEQ/L
TRIGLYCERIDES: 178 MG/DL

## 2019-01-09 ENCOUNTER — PRIOR ORIGINAL RECORDS (OUTPATIENT)
Dept: OTHER | Age: 84
End: 2019-01-09

## 2019-02-28 VITALS
DIASTOLIC BLOOD PRESSURE: 56 MMHG | BODY MASS INDEX: 31.83 KG/M2 | SYSTOLIC BLOOD PRESSURE: 162 MMHG | HEIGHT: 62 IN | WEIGHT: 173 LBS | HEART RATE: 60 BPM

## 2019-02-28 VITALS
HEIGHT: 62 IN | BODY MASS INDEX: 31.83 KG/M2 | SYSTOLIC BLOOD PRESSURE: 154 MMHG | WEIGHT: 173 LBS | HEART RATE: 58 BPM | DIASTOLIC BLOOD PRESSURE: 54 MMHG

## 2019-02-28 VITALS
BODY MASS INDEX: 31.83 KG/M2 | HEART RATE: 56 BPM | SYSTOLIC BLOOD PRESSURE: 166 MMHG | WEIGHT: 173 LBS | DIASTOLIC BLOOD PRESSURE: 68 MMHG | HEIGHT: 62 IN

## 2019-03-01 VITALS
DIASTOLIC BLOOD PRESSURE: 58 MMHG | HEIGHT: 62 IN | BODY MASS INDEX: 31.28 KG/M2 | WEIGHT: 170 LBS | HEART RATE: 67 BPM | SYSTOLIC BLOOD PRESSURE: 144 MMHG

## 2019-04-03 RX ORDER — HYDROCHLOROTHIAZIDE 25 MG/1
TABLET ORAL
COMMUNITY
Start: 2018-06-12

## 2019-04-03 RX ORDER — ATORVASTATIN CALCIUM 40 MG/1
TABLET, FILM COATED ORAL
COMMUNITY
Start: 2015-06-11

## 2019-06-07 ENCOUNTER — LAB ENCOUNTER (OUTPATIENT)
Dept: LAB | Age: 84
End: 2019-06-07
Attending: INTERNAL MEDICINE
Payer: MEDICARE

## 2019-06-07 DIAGNOSIS — I10 ESSENTIAL HYPERTENSION, MALIGNANT: ICD-10-CM

## 2019-06-07 DIAGNOSIS — E78.00 PURE HYPERCHOLESTEROLEMIA: Primary | ICD-10-CM

## 2019-06-07 PROCEDURE — 36415 COLL VENOUS BLD VENIPUNCTURE: CPT

## 2019-06-07 PROCEDURE — 80061 LIPID PANEL: CPT

## 2019-06-07 PROCEDURE — 80053 COMPREHEN METABOLIC PANEL: CPT

## 2019-06-11 ENCOUNTER — OFFICE VISIT (OUTPATIENT)
Dept: CARDIOLOGY | Age: 84
End: 2019-06-11

## 2019-06-11 VITALS — BODY MASS INDEX: 32.28 KG/M2 | HEIGHT: 61 IN | WEIGHT: 171 LBS

## 2019-06-11 DIAGNOSIS — I65.23 ASYMPTOMATIC CAROTID ARTERY STENOSIS, BILATERAL: ICD-10-CM

## 2019-06-11 DIAGNOSIS — E78.00 PURE HYPERCHOLESTEROLEMIA: ICD-10-CM

## 2019-06-11 DIAGNOSIS — I35.0 NONRHEUMATIC AORTIC VALVE STENOSIS: ICD-10-CM

## 2019-06-11 DIAGNOSIS — I10 HYPERTENSION, BENIGN: ICD-10-CM

## 2019-06-11 DIAGNOSIS — R94.31 ABNORMAL EKG: ICD-10-CM

## 2019-06-11 DIAGNOSIS — R09.89 CAROTID BRUIT, UNSPECIFIED LATERALITY: Primary | ICD-10-CM

## 2019-06-11 PROCEDURE — 99214 OFFICE O/P EST MOD 30 MIN: CPT | Performed by: INTERNAL MEDICINE

## 2019-06-11 RX ORDER — LISINOPRIL 5 MG/1
5 TABLET ORAL DAILY
COMMUNITY
Start: 2018-11-15 | End: 2020-06-09

## 2019-06-11 SDOH — HEALTH STABILITY: PHYSICAL HEALTH: ON AVERAGE, HOW MANY DAYS PER WEEK DO YOU ENGAGE IN MODERATE TO STRENUOUS EXERCISE (LIKE A BRISK WALK)?: 0 DAYS

## 2019-06-11 SDOH — HEALTH STABILITY: PHYSICAL HEALTH: ON AVERAGE, HOW MANY MINUTES DO YOU ENGAGE IN EXERCISE AT THIS LEVEL?: 0 MIN

## 2019-08-21 ENCOUNTER — TELEPHONE (OUTPATIENT)
Dept: FAMILY MEDICINE CLINIC | Facility: CLINIC | Age: 84
End: 2019-08-21

## 2019-08-21 DIAGNOSIS — Z12.31 ENCOUNTER FOR SCREENING MAMMOGRAM FOR MALIGNANT NEOPLASM OF BREAST: ICD-10-CM

## 2019-08-21 DIAGNOSIS — Z12.39 SCREENING FOR BREAST CANCER: Primary | ICD-10-CM

## 2019-09-10 ENCOUNTER — HOSPITAL ENCOUNTER (OUTPATIENT)
Dept: MAMMOGRAPHY | Age: 84
Discharge: HOME | End: 2019-09-10
Attending: FAMILY MEDICINE
Payer: MEDICARE

## 2019-09-10 ENCOUNTER — PATIENT OUTREACH (OUTPATIENT)
Dept: FAMILY MEDICINE CLINIC | Facility: CLINIC | Age: 84
End: 2019-09-10

## 2019-09-10 DIAGNOSIS — Z12.31 ENCOUNTER FOR SCREENING MAMMOGRAM FOR MALIGNANT NEOPLASM OF BREAST: ICD-10-CM

## 2019-09-10 DIAGNOSIS — Z12.39 SCREENING FOR BREAST CANCER: ICD-10-CM

## 2019-09-10 PROCEDURE — 77063 BREAST TOMOSYNTHESIS BI: CPT | Performed by: FAMILY MEDICINE

## 2019-09-10 PROCEDURE — 77067 SCR MAMMO BI INCL CAD: CPT | Performed by: FAMILY MEDICINE

## 2019-09-10 NOTE — PROGRESS NOTES
Wife wants to wait to schedule for November.  will be in Oct and we can ask her at that time to [facility].

## 2019-09-25 NOTE — TELEPHONE ENCOUNTER
Patient:   LEO HUDDLESTON            MRN: [id number]            FIN: [id number]               Age:   76 years     Sex:  MALE     :  44   Associated Diagnoses:   None   Author:   BAILEY CHAMPION      Chief Complaint   76 yr old aam with h/o htn,ckd admitted for evalaution and management of syncope.Apperantly pt was haveing dinner passed out at home.No seizure activity.No bladder or bowel incontinance. Deniesa ny ches tpain sob or any other complaitns..      Review of Systems   Constitutional:  Negative except as documented in history of present illness.    Eye:  Negative except as documented in history of present illness.    Ear/Nose/Mouth/Throat:  Negative except as documented in history of present illness.    Cardiovascular:  Negative except as documented in history of present illness.    Respiratory:  Negative except as documented in history of present illness.    Gastrointestinal:  Negative except as documented in history of present illness.    Genitourinary:  Negative except as documented in history of present illness.    Musculoskeletal:  Negative except as documented in history of present illness.    Integumentary:  Negative except as documented in history of present illness.    Hematology/Lymphatics:  Negative except as documented in history of present illness.    Neurologic:  Negative except as documented in history of present illness.    Endocrine:  Negative except as documented in history of present illness.    Allergy/Immunologic:  Negative except as documented in history of present illness.    Psychiatric:  Negative except as documented in history of present illness.    All other systems ROS reviewed as documented in chart.     Histories   Past Med History: Past Medical History   HLD - Hyperlipidemia  HTN - Hypertension     Family History:    FATHER  CA - Cancer of colon  GRANDMOTHER  CA - Cancer of colon  BROTHER  Congestive heart failure     Social History       Patient has abnormal mammogram- needs biopsy. Dr. Jillian Davis has already seen this result and ordered patient to have a breast biopsy.   Alcohol  Details: Use: None.  Exercise  Comment(s): None  Substance Abuse  Details: Use: None.  Tobacco  Details: Smoker in [place]: No.  Smoked/Smokeless Tobacco Last 30 Days: No.  Smoking Tobacco Use: Light tobacco smoker.  Smokeless Tobacco Use Never.  Cultural/Mormonism Practices  Details: Mormonism or Cultural Practices While in Hospital: Religious.  .        Health Status   Allergies: Allergies (ST)   Allergies (1) Active Reaction  NKA None Documented     Current medications:    Medications (5) Active  Scheduled: (5)  AmLODIPine 5 mg tab  5 mg 1 tab, Oral, Daily  Atorvastatin 10 mg tab  10 mg 1 tab, Oral, Daily  Heparin 5,000 unit/1 mL inj  5,000 unit 1 mL, Subcutaneous, Q8H  Lisinopril 10 mg tab  10 mg 1 tab, Oral, Daily  Pantoprazole 40 mg DR tab  40 mg 1 tab, Oral, BID  Continuous: (0)  PRN: (0)        Physical Examination   VS/Measurements        Vitals between:   24-AUG-2018 12:04:24   TO   25-AUG-2018 12:04:24                   LAST RESULT MINIMUM MAXIMUM  Temperature 36.5 36.3 36.6  Heart Rate 67 66 76  Respiratory Rate 16 16 20  NISBP           137 113 137  NIDBP           83 71 83  NIMBP           92 92 94  SpO2                    98 97 99     General:  Alert and oriented, No acute distress.    Eye:  Extraocular movements are intact.    HENT:  Normocephalic.    Neck:  Supple, Non-tender.    Respiratory:  Lungs are clear to auscultation.    Cardiovascular:  Normal rate.    Lymphatics:  No lymphadenopathy neck, axilla, groin.    Musculoskeletal:  Normal strength.    Neurologic:  Alert, Oriented.    Cognition and Speech:  Oriented, Speech clear and coherent.    Psychiatric:  Cooperative, Appropriate mood & affect.       Review / Management   Laboratory results:       Labs between:  24-AUG-2018 12:04 to 25-AUG-2018 12:04    CBC:                 WBC  HgB  Hct  Plt  MCV  RDW   24-AUG-2018 9.3  14.2  41.5  200  87.6  13.9     DIFF:                 Seg  Neutroph//ABS  Lymph//ABS  Mono//ABS  EOS/ABS    24-AUG-2018 NOT APPLICABLE  49 // 4.5 44 // (H) 4.1  6 // 0.5 1 // 0.1    BMP:                 Na  Cl  BUN  Glu   24-AUG-2018 141  105  (H) 21  (H) 164                              K  CO2  Cr  Ca                              3.4  24  (H) 1.48  8.8     CMP:                 AST  ALT  AlkPhos  Bili  Albumin   24-AUG-2018 16  15  58  0.8  3.8     POC GLU:                 Latest Result  Latest Date  Minimum  Min Date  Maximum  Max Date                             (H) 161  24-AUG-2018 (H) 161  24-AUG-2018 (H) 161                              .    Radiology results                   Result title:  XR CHEST 2V  Result status:  Final  Verified by:  WERNER, MATILDE on 08/25/2018   IMPRESSION: Left diaphragm mild elevation.  Surgical staples in the upper abdomen and at GE junction.  Otherwise normal chest.         Impression and Plan   Dx and Plan:     Diagnosis      Assessment/Plan:     Chronic kidney disease (CKD)..hydration      _     Hypertension..stable.      _     Syncope..syncope work up,pt/ot,neurology eval,cbc,bmp in am.      _      .     .         Course: Progressing as expected.    Dx and Plan:       Course: I expect that this patient (a) requires/will require medically necessary hospital services for two midnights or more, including contigous outpatient services or (b) has had/ will have a procedure that is on the current CMS Inpatient Only Procedures listing, based on the above clinical rationale. I certify that services were provided in accordance with CMS regulations set forth at 42 CFR & 412.3, Medical Reason for Two Midnight Stay is:.    Education and Follow-up:       Counseled: Patient, Family.             Electronically Signed On 08/25/2018 12:06  __________________________________________________   BAILEY CHAMPION

## 2019-10-29 ENCOUNTER — PATIENT OUTREACH (OUTPATIENT)
Dept: FAMILY MEDICINE CLINIC | Facility: CLINIC | Age: 84
End: 2019-10-29

## 2019-11-21 ENCOUNTER — OFFICE VISIT (OUTPATIENT)
Dept: FAMILY MEDICINE CLINIC | Facility: CLINIC | Age: 84
End: 2019-11-21
Payer: MEDICARE

## 2019-11-21 VITALS
SYSTOLIC BLOOD PRESSURE: 144 MMHG | DIASTOLIC BLOOD PRESSURE: 68 MMHG | OXYGEN SATURATION: 98 % | HEART RATE: 60 BPM | BODY MASS INDEX: 31.94 KG/M2 | WEIGHT: 167 LBS | RESPIRATION RATE: 18 BRPM | HEIGHT: 60.5 IN

## 2019-11-21 DIAGNOSIS — I65.21 CAROTID ARTERY PLAQUE, RIGHT: ICD-10-CM

## 2019-11-21 DIAGNOSIS — E78.00 PURE HYPERCHOLESTEROLEMIA: ICD-10-CM

## 2019-11-21 DIAGNOSIS — I10 ESSENTIAL HYPERTENSION: Primary | ICD-10-CM

## 2019-11-21 DIAGNOSIS — Z00.00 ENCOUNTER FOR ANNUAL HEALTH EXAMINATION: ICD-10-CM

## 2019-11-21 PROCEDURE — G0439 PPPS, SUBSEQ VISIT: HCPCS | Performed by: FAMILY MEDICINE

## 2019-11-21 RX ORDER — HYDROCHLOROTHIAZIDE 25 MG/1
50 TABLET ORAL DAILY
Qty: 180 TABLET | Refills: 3 | Status: SHIPPED | OUTPATIENT
Start: 2019-11-21 | End: 2019-12-09

## 2019-11-21 RX ORDER — ATORVASTATIN CALCIUM 40 MG/1
40 TABLET, FILM COATED ORAL NIGHTLY
Qty: 90 TABLET | Refills: 3 | Status: SHIPPED | OUTPATIENT
Start: 2019-11-21 | End: 2020-01-06

## 2019-11-21 NOTE — PATIENT INSTRUCTIONS
Jinny Ocampo's SCREENING SCHEDULE   Tests on this list are recommended by your physician but may not be covered, or covered at this frequency, by your insurer. Please check with your insurance carrier before scheduling to verify coverage.    ZOFIA between ages 73-68) IPPE only No results found for this or any previous visit.  Limited to patients who meet one of the following criteria:   • Men who are 73-68 years old and have smoked more than 100 cigarettes in their lifetime   • Anyone with a family h needed after 75 There are no preventive care reminders to display for this patient. Please get this Mammogram regularly   Immunizations      Influenza  Covered Annually No orders found for this or any previous visit.  Please get every year    Pneumococcal 1 link also has information from the [address] [place] [address] regarding Advance Directives.

## 2019-11-21 NOTE — PROGRESS NOTES
HPI:   Sandy Chase is a 81year old female who presents for a Medicare Subsequent Annual Wellness visit (Pt already had Initial Annual Wellness). Having echocardiogram on Tuesday. Follows with Dr. Georgiana Pino.   Cholesterol labs reviewed from the summe was screened for Alcohol abuse and had a score of 0 so is at low risk.     Patient Care Team: Patient Care Team:  Perico Jay MD as PCP - General (Family Practice)  Perico Jay MD as PCP - [facility]  Michael Heredia MD as Surgeon (SURGERY, GENERAL)  Tona Zamora incisional (1/1/2007); punc/aspir breast cyst (1/1/2008); femur/knee surg unlisted (1/1/1985); needle biopsy right (2008); OCT, Retina - OS - Left Eye; Breast biopsy (07/22/15); santana localization wire 1 site left (cpt=19281) (7/2015); santana biopsy stereo candiceu FLUAD High Dose 65 yr and older (69064) 09/24/2018   • Influenza 10/01/2011, 10/15/2012, 10/07/2014, 10/26/2016, 10/31/2017   • Pneumococcal (Prevnar 13) 08/26/2015, 10/26/2016   • Pneumovax 23 03/23/2012   • Zoster Vaccine Live (Zostavax) 06/15/2012 Fasting Blood Sugar (FSB)Annually Glucose (mg/dL)   Date Value   06/07/2019 101 (H)     GLUCOSE (mg/dL)   Date Value   06/05/2014 98   12/29/2011 90          Cardiovascular Disease Screening     LDL Annually LDL Cholesterol (mg/dL)   Date Value   06/07/201 birthday    Hepatitis B for Moderate/High Risk No vaccine history found Medium/high risk factors:   End-stage renal disease   Hemophiliacs who received Factor VIII or IX concentrates   Clients of institutions for the mentally retarded   Persons who live in

## 2019-11-26 ENCOUNTER — HOSPITAL ENCOUNTER (OUTPATIENT)
Dept: CV DIAGNOSTICS | Age: 84
Discharge: HOME | End: 2019-11-26
Attending: INTERNAL MEDICINE
Payer: MEDICARE

## 2019-11-26 DIAGNOSIS — R94.31 ABNORMAL EKG: ICD-10-CM

## 2019-11-26 DIAGNOSIS — I10 BENIGN HYPERTENSION: ICD-10-CM

## 2019-11-26 DIAGNOSIS — I35.0 NONRHEUMATIC AORTIC VALVE STENOSIS: ICD-10-CM

## 2019-11-26 PROCEDURE — 93306 TTE W/DOPPLER COMPLETE: CPT | Performed by: INTERNAL MEDICINE

## 2019-11-29 ENCOUNTER — TELEPHONE (OUTPATIENT)
Dept: CARDIOLOGY | Age: 84
End: 2019-11-29

## 2019-11-29 DIAGNOSIS — I35.0 NONRHEUMATIC AORTIC VALVE STENOSIS: Primary | ICD-10-CM

## 2019-12-02 DIAGNOSIS — R94.31 ABNORMAL EKG: ICD-10-CM

## 2019-12-02 DIAGNOSIS — I35.0 NONRHEUMATIC AORTIC VALVE STENOSIS: ICD-10-CM

## 2019-12-02 DIAGNOSIS — I10 HYPERTENSION, BENIGN: ICD-10-CM

## 2019-12-07 ENCOUNTER — HOSPITAL ENCOUNTER (OUTPATIENT)
Dept: ULTRASOUND IMAGING | Age: 84
Discharge: HOME | End: 2019-12-07
Attending: FAMILY MEDICINE
Payer: MEDICARE

## 2019-12-07 DIAGNOSIS — I65.21 CAROTID ARTERY PLAQUE, RIGHT: ICD-10-CM

## 2019-12-07 PROCEDURE — 93880 EXTRACRANIAL BILAT STUDY: CPT | Performed by: FAMILY MEDICINE

## 2019-12-09 RX ORDER — HYDROCHLOROTHIAZIDE 25 MG/1
TABLET ORAL
Qty: 90 TABLET | Refills: 0 | Status: SHIPPED | OUTPATIENT
Start: 2019-12-09 | End: 2020-12-28

## 2019-12-20 ENCOUNTER — LABORATORY ENCOUNTER (OUTPATIENT)
Dept: LAB | Age: 84
End: 2019-12-20
Attending: INTERNAL MEDICINE
Payer: MEDICARE

## 2019-12-20 DIAGNOSIS — I10 ESSENTIAL HYPERTENSION, MALIGNANT: Primary | ICD-10-CM

## 2019-12-20 DIAGNOSIS — E78.00 PURE HYPERCHOLESTEROLEMIA: ICD-10-CM

## 2019-12-20 PROCEDURE — 80053 COMPREHEN METABOLIC PANEL: CPT

## 2019-12-20 PROCEDURE — 80061 LIPID PANEL: CPT

## 2019-12-20 PROCEDURE — 36415 COLL VENOUS BLD VENIPUNCTURE: CPT

## 2019-12-27 ENCOUNTER — TELEPHONE (OUTPATIENT)
Dept: CARDIOLOGY | Age: 84
End: 2019-12-27

## 2020-01-06 DIAGNOSIS — E78.00 PURE HYPERCHOLESTEROLEMIA: ICD-10-CM

## 2020-01-06 RX ORDER — ATORVASTATIN CALCIUM 40 MG/1
TABLET, FILM COATED ORAL
Qty: 90 TABLET | Refills: 3 | Status: SHIPPED | OUTPATIENT
Start: 2020-01-06 | End: 2020-12-28

## 2020-05-27 ENCOUNTER — HOSPITAL ENCOUNTER (OUTPATIENT)
Dept: CV DIAGNOSTICS | Age: 85
Discharge: HOME | End: 2020-05-27
Attending: INTERNAL MEDICINE
Payer: MEDICARE

## 2020-05-27 DIAGNOSIS — I35.0 NONRHEUMATIC AORTIC VALVE STENOSIS: ICD-10-CM

## 2020-05-27 PROCEDURE — 93306 TTE W/DOPPLER COMPLETE: CPT | Performed by: INTERNAL MEDICINE

## 2020-05-28 ENCOUNTER — TELEPHONE (OUTPATIENT)
Dept: CARDIOLOGY | Age: 85
End: 2020-05-28

## 2020-05-28 DIAGNOSIS — I35.0 NONRHEUMATIC AORTIC VALVE STENOSIS: ICD-10-CM

## 2020-06-09 ENCOUNTER — VIRTUAL PHONE E/M (OUTPATIENT)
Dept: FAMILY MEDICINE CLINIC | Facility: CLINIC | Age: 85
End: 2020-06-09
Payer: MEDICARE

## 2020-06-09 ENCOUNTER — OFFICE VISIT (OUTPATIENT)
Dept: CARDIOLOGY | Age: 85
End: 2020-06-09

## 2020-06-09 ENCOUNTER — TELEPHONE (OUTPATIENT)
Dept: FAMILY MEDICINE CLINIC | Facility: CLINIC | Age: 85
End: 2020-06-09

## 2020-06-09 VITALS
BODY MASS INDEX: 31.15 KG/M2 | DIASTOLIC BLOOD PRESSURE: 64 MMHG | WEIGHT: 165 LBS | HEART RATE: 55 BPM | HEIGHT: 61 IN | SYSTOLIC BLOOD PRESSURE: 135 MMHG

## 2020-06-09 DIAGNOSIS — R94.31 ABNORMAL EKG: ICD-10-CM

## 2020-06-09 DIAGNOSIS — R09.89 CAROTID BRUIT, UNSPECIFIED LATERALITY: Primary | ICD-10-CM

## 2020-06-09 DIAGNOSIS — E78.00 PURE HYPERCHOLESTEROLEMIA: ICD-10-CM

## 2020-06-09 DIAGNOSIS — I35.0 NONRHEUMATIC AORTIC VALVE STENOSIS: ICD-10-CM

## 2020-06-09 DIAGNOSIS — I10 HYPERTENSION, BENIGN: ICD-10-CM

## 2020-06-09 DIAGNOSIS — M25.562 ACUTE PAIN OF LEFT KNEE: Primary | ICD-10-CM

## 2020-06-09 DIAGNOSIS — M79.662 TENDERNESS OF LEFT CALF: ICD-10-CM

## 2020-06-09 DIAGNOSIS — I65.23 ASYMPTOMATIC CAROTID ARTERY STENOSIS, BILATERAL: ICD-10-CM

## 2020-06-09 PROCEDURE — 99442 TELEPHONE E&M BY PHYSICIAN EST PT NOT ORIG PREV 7 DAYS 11-20 MIN: CPT | Performed by: INTERNAL MEDICINE

## 2020-06-09 PROCEDURE — 99443 PHONE E/M BY PHYS 21-30 MIN: CPT | Performed by: FAMILY MEDICINE

## 2020-06-09 RX ORDER — ACETAMINOPHEN 500 MG
500 TABLET ORAL EVERY 6 HOURS PRN
COMMUNITY

## 2020-06-09 RX ORDER — PREDNISONE 20 MG/1
40 TABLET ORAL DAILY
Qty: 10 TABLET | Refills: 0 | Status: SHIPPED | OUTPATIENT
Start: 2020-06-09 | End: 2020-06-14

## 2020-06-09 SDOH — HEALTH STABILITY: PHYSICAL HEALTH: ON AVERAGE, HOW MANY DAYS PER WEEK DO YOU ENGAGE IN MODERATE TO STRENUOUS EXERCISE (LIKE A BRISK WALK)?: 0 DAYS

## 2020-06-09 SDOH — HEALTH STABILITY: PHYSICAL HEALTH: ON AVERAGE, HOW MANY MINUTES DO YOU ENGAGE IN EXERCISE AT THIS LEVEL?: 0 MIN

## 2020-06-09 ASSESSMENT — ENCOUNTER SYMPTOMS
WEIGHT GAIN: 0
FEVER: 0
HEMATOCHEZIA: 0
CHILLS: 0
COUGH: 0
WEIGHT LOSS: 0
BRUISES/BLEEDS EASILY: 0
HEMOPTYSIS: 0
ALLERGIC/IMMUNOLOGIC COMMENTS: NO NEW FOOD ALLERGIES
SUSPICIOUS LESIONS: 0

## 2020-06-09 NOTE — TELEPHONE ENCOUNTER
Per , schedule pt for stat u/s tomorrow after 11:30. Pt unable to go to day due to car in shop. appt scheduled ar 5:00pm 6/10/20, [address],  Call when arrives. Pt notified registration will be calling her to confirm.

## 2020-06-09 NOTE — PROGRESS NOTES
Virtual Telephone Check-In    Noemi Garrido verbally consents to a Virtual/Telephone Check-In visit on 06/09/20. Patient understands and accepts financial responsibility for any deductible, co-insurance and/or co-pays associated with this service. CHARLY/ASPIR BREAST CYST  1/1/2008     MEDICATIONS:  Current Outpatient Medications   Medication Sig Dispense Refill   • ATORVASTATIN 40 MG Oral Tab TAKE 1 TABLET(40 MG) BY MOUTH EVERY NIGHT 90 tablet 3   • HYDROCHLOROTHIAZIDE 25 MG Oral Tab TAKE 1 TABLET(25 performed. Every conscious effort was taken to allow for sufficient and adequate time. This billing was spent on reviewing labs, medications, radiology tests and decision making.   Appropriate medical decision-making and tests are ordered as detailed in t

## 2020-06-10 ENCOUNTER — HOSPITAL ENCOUNTER (OUTPATIENT)
Dept: ULTRASOUND IMAGING | Age: 85
Discharge: HOME | End: 2020-06-10
Attending: FAMILY MEDICINE
Payer: MEDICARE

## 2020-06-10 DIAGNOSIS — M25.562 ACUTE PAIN OF LEFT KNEE: ICD-10-CM

## 2020-06-10 DIAGNOSIS — M79.662 TENDERNESS OF LEFT CALF: ICD-10-CM

## 2020-06-10 PROCEDURE — 93971 EXTREMITY STUDY: CPT | Performed by: FAMILY MEDICINE

## 2020-06-22 ENCOUNTER — TELEPHONE (OUTPATIENT)
Dept: FAMILY MEDICINE CLINIC | Facility: CLINIC | Age: 85
End: 2020-06-22

## 2020-06-22 NOTE — TELEPHONE ENCOUNTER
Pt states leg pain is not getting any better. DVT was neg. Advised appt. Appt scheduled for Thursday.

## 2020-06-25 ENCOUNTER — OFFICE VISIT (OUTPATIENT)
Dept: FAMILY MEDICINE CLINIC | Facility: CLINIC | Age: 85
End: 2020-06-25
Payer: MEDICARE

## 2020-06-25 VITALS
SYSTOLIC BLOOD PRESSURE: 140 MMHG | HEIGHT: 60.5 IN | HEART RATE: 64 BPM | WEIGHT: 168 LBS | RESPIRATION RATE: 18 BRPM | DIASTOLIC BLOOD PRESSURE: 62 MMHG | BODY MASS INDEX: 32.13 KG/M2 | OXYGEN SATURATION: 98 %

## 2020-06-25 DIAGNOSIS — S90.31XA CONTUSION OF RIGHT FOOT, INITIAL ENCOUNTER: ICD-10-CM

## 2020-06-25 DIAGNOSIS — T22.132A SUPERFICIAL BURN OF LEFT UPPER ARM, INITIAL ENCOUNTER: ICD-10-CM

## 2020-06-25 DIAGNOSIS — M25.562 MEDIAL KNEE PAIN, LEFT: Primary | ICD-10-CM

## 2020-06-25 PROCEDURE — 99214 OFFICE O/P EST MOD 30 MIN: CPT | Performed by: FAMILY MEDICINE

## 2020-06-25 NOTE — PROGRESS NOTES
Here in follow-up on left knee and calf pain. We had a phone consultation a few weeks back. I sent her for venous Doppler concern for DVT. This was negative. She has been taking anti-inflammatories but we elected not to use the prednisone.   Her pain wa Outpatient Medications   Medication Sig Dispense Refill   • ATORVASTATIN 40 MG Oral Tab TAKE 1 TABLET(40 MG) BY MOUTH EVERY NIGHT 90 tablet 3   • HYDROCHLOROTHIAZIDE 25 MG Oral Tab TAKE 1 TABLET(25 MG) BY MOUTH DAILY 90 tablet 0     ALLERGIES:   Amoxicilli

## 2020-06-30 ENCOUNTER — HOSPITAL ENCOUNTER (OUTPATIENT)
Dept: GENERAL RADIOLOGY | Age: 85
Discharge: HOME | End: 2020-06-30
Attending: FAMILY MEDICINE
Payer: MEDICARE

## 2020-06-30 DIAGNOSIS — M25.562 MEDIAL KNEE PAIN, LEFT: ICD-10-CM

## 2020-06-30 PROCEDURE — 73560 X-RAY EXAM OF KNEE 1 OR 2: CPT | Performed by: FAMILY MEDICINE

## 2020-07-02 ENCOUNTER — TELEPHONE (OUTPATIENT)
Dept: FAMILY MEDICINE CLINIC | Facility: CLINIC | Age: 85
End: 2020-07-02

## 2020-07-02 NOTE — TELEPHONE ENCOUNTER
Spoke with patient for update:  Finished prednisoneTuesday night. Abdominal pain started Wednesday morning gradually worse. Located one inch above navel, tightness around circumferential bra line, worse with laying down, dull pressure pain.    Denies ches

## 2020-07-02 NOTE — TELEPHONE ENCOUNTER
Pt on steroids for 5 days - stomach nausea and pain now and \"it's very uncomfortable\".   Pls advise

## 2020-07-03 NOTE — TELEPHONE ENCOUNTER
Likely side effect to the steroid with the decreased appetite and nausea. I suspect that will pass in a few days.

## 2020-07-03 NOTE — TELEPHONE ENCOUNTER
Patient notified, verbalized understanding.   Patient reports improvement in symptoms, and ate eggs for breakfast.

## 2020-08-21 ENCOUNTER — OFFICE VISIT (OUTPATIENT)
Dept: FAMILY MEDICINE CLINIC | Facility: CLINIC | Age: 85
End: 2020-08-21
Payer: MEDICARE

## 2020-08-21 VITALS
BODY MASS INDEX: 31.65 KG/M2 | HEIGHT: 60.5 IN | DIASTOLIC BLOOD PRESSURE: 56 MMHG | WEIGHT: 165.5 LBS | RESPIRATION RATE: 18 BRPM | SYSTOLIC BLOOD PRESSURE: 140 MMHG | HEART RATE: 70 BPM | OXYGEN SATURATION: 98 %

## 2020-08-21 DIAGNOSIS — L73.9 FOLLICULITIS: Primary | ICD-10-CM

## 2020-08-21 PROBLEM — I77.9 CAROTID ARTERY DISEASE: Status: RESOLVED | Noted: 2017-05-17 | Resolved: 2020-08-21

## 2020-08-21 PROBLEM — I77.9 CAROTID ARTERY DISEASE (HCC): Status: RESOLVED | Noted: 2017-05-17 | Resolved: 2020-08-21

## 2020-08-21 PROCEDURE — 99213 OFFICE O/P EST LOW 20 MIN: CPT | Performed by: FAMILY MEDICINE

## 2020-08-21 RX ORDER — VALACYCLOVIR HYDROCHLORIDE 1 G/1
1 TABLET, FILM COATED ORAL EVERY 12 HOURS SCHEDULED
Qty: 14 TABLET | Refills: 0 | Status: SHIPPED | OUTPATIENT
Start: 2020-08-21 | End: 2020-08-28

## 2020-08-21 NOTE — PATIENT INSTRUCTIONS
Likely folliculitis as it is in more than one dermatome and no blisters. Less likely shingles, but with He's immunosuppression, we will treat for shingles as well. Fluid from blisters is contageous, so don't let him contact any fluid.

## 2020-08-21 NOTE — PROGRESS NOTES
1 week of rash on the back of the neck front of the neck and axilla. There is been itching. Not painful. She has had a shingles vaccine. No fevers or chills.  is immunocompromised. She is tried hydrocortisone cream on it without relief.   There Zetia [Ezetimibe]  FAMILY HISTORY  No family history on file. PHYSICAL EXAM:  /56   Pulse 70   Resp 18   Ht 60.5\"   Wt 165 lb 8 oz (75.1 kg)   SpO2 98%   BMI 31.79 kg/m²     Alert no acute distress.   Papular rash on the back of the neck upper maximiliano

## 2020-11-04 ENCOUNTER — LAB ENCOUNTER (OUTPATIENT)
Dept: LAB | Age: 85
End: 2020-11-04
Attending: INTERNAL MEDICINE
Payer: MEDICARE

## 2020-11-04 DIAGNOSIS — E78.00 PURE HYPERCHOLESTEROLEMIA: ICD-10-CM

## 2020-11-04 DIAGNOSIS — I10 ESSENTIAL HYPERTENSION, MALIGNANT: ICD-10-CM

## 2020-11-04 DIAGNOSIS — I35.0 NODULAR CALCIFIC AORTIC VALVE STENOSIS: Primary | ICD-10-CM

## 2020-11-04 PROCEDURE — 80061 LIPID PANEL: CPT

## 2020-11-04 PROCEDURE — 36415 COLL VENOUS BLD VENIPUNCTURE: CPT

## 2020-11-04 PROCEDURE — 80053 COMPREHEN METABOLIC PANEL: CPT

## 2020-11-06 ENCOUNTER — TELEPHONE (OUTPATIENT)
Dept: CARDIOLOGY | Age: 85
End: 2020-11-06

## 2020-12-01 ENCOUNTER — OFFICE VISIT (OUTPATIENT)
Dept: CARDIOLOGY | Age: 85
End: 2020-12-01

## 2020-12-01 VITALS
HEART RATE: 58 BPM | HEIGHT: 61 IN | WEIGHT: 168 LBS | BODY MASS INDEX: 31.72 KG/M2 | SYSTOLIC BLOOD PRESSURE: 138 MMHG | DIASTOLIC BLOOD PRESSURE: 58 MMHG

## 2020-12-01 DIAGNOSIS — E78.00 PURE HYPERCHOLESTEROLEMIA: ICD-10-CM

## 2020-12-01 DIAGNOSIS — I35.0 NONRHEUMATIC AORTIC VALVE STENOSIS: ICD-10-CM

## 2020-12-01 DIAGNOSIS — I10 HYPERTENSION, BENIGN: ICD-10-CM

## 2020-12-01 DIAGNOSIS — I73.9 PAD (PERIPHERAL ARTERY DISEASE) (CMD): ICD-10-CM

## 2020-12-01 DIAGNOSIS — R09.89 CAROTID BRUIT, UNSPECIFIED LATERALITY: ICD-10-CM

## 2020-12-01 DIAGNOSIS — R94.31 ABNORMAL EKG: ICD-10-CM

## 2020-12-01 DIAGNOSIS — R53.83 FATIGUE, UNSPECIFIED TYPE: ICD-10-CM

## 2020-12-01 DIAGNOSIS — I65.23 ASYMPTOMATIC CAROTID ARTERY STENOSIS, BILATERAL: Primary | ICD-10-CM

## 2020-12-01 PROCEDURE — 99214 OFFICE O/P EST MOD 30 MIN: CPT | Performed by: INTERNAL MEDICINE

## 2020-12-01 RX ORDER — IBUPROFEN 200 MG
200 TABLET ORAL EVERY 6 HOURS PRN
COMMUNITY

## 2020-12-01 SDOH — HEALTH STABILITY: PHYSICAL HEALTH: ON AVERAGE, HOW MANY MINUTES DO YOU ENGAGE IN EXERCISE AT THIS LEVEL?: 0 MIN

## 2020-12-01 SDOH — HEALTH STABILITY: PHYSICAL HEALTH: ON AVERAGE, HOW MANY DAYS PER WEEK DO YOU ENGAGE IN MODERATE TO STRENUOUS EXERCISE (LIKE A BRISK WALK)?: 0 DAYS

## 2020-12-01 ASSESSMENT — PATIENT HEALTH QUESTIONNAIRE - PHQ9
1. LITTLE INTEREST OR PLEASURE IN DOING THINGS: NOT AT ALL
SUM OF ALL RESPONSES TO PHQ9 QUESTIONS 1 AND 2: 0
CLINICAL INTERPRETATION OF PHQ9 SCORE: NO FURTHER SCREENING NEEDED
CLINICAL INTERPRETATION OF PHQ2 SCORE: NO FURTHER SCREENING NEEDED
2. FEELING DOWN, DEPRESSED OR HOPELESS: NOT AT ALL
SUM OF ALL RESPONSES TO PHQ9 QUESTIONS 1 AND 2: 0

## 2020-12-28 ENCOUNTER — OFFICE VISIT (OUTPATIENT)
Dept: FAMILY MEDICINE CLINIC | Facility: CLINIC | Age: 85
End: 2020-12-28
Payer: MEDICARE

## 2020-12-28 VITALS
BODY MASS INDEX: 32.17 KG/M2 | SYSTOLIC BLOOD PRESSURE: 130 MMHG | HEART RATE: 56 BPM | HEIGHT: 60.25 IN | DIASTOLIC BLOOD PRESSURE: 70 MMHG | OXYGEN SATURATION: 98 % | RESPIRATION RATE: 18 BRPM | WEIGHT: 166 LBS

## 2020-12-28 DIAGNOSIS — Z00.00 ENCOUNTER FOR ANNUAL HEALTH EXAMINATION: ICD-10-CM

## 2020-12-28 DIAGNOSIS — H25.013 CORTICAL AGE-RELATED CATARACT OF BOTH EYES: ICD-10-CM

## 2020-12-28 DIAGNOSIS — I35.9 AORTIC VALVE CALCIFICATION: ICD-10-CM

## 2020-12-28 DIAGNOSIS — E78.00 PURE HYPERCHOLESTEROLEMIA: ICD-10-CM

## 2020-12-28 DIAGNOSIS — I35.0 NONRHEUMATIC AORTIC VALVE STENOSIS: ICD-10-CM

## 2020-12-28 DIAGNOSIS — I10 ESSENTIAL HYPERTENSION: Primary | ICD-10-CM

## 2020-12-28 DIAGNOSIS — M17.11 PRIMARY OSTEOARTHRITIS OF RIGHT KNEE: ICD-10-CM

## 2020-12-28 PROCEDURE — G0439 PPPS, SUBSEQ VISIT: HCPCS | Performed by: FAMILY MEDICINE

## 2020-12-28 RX ORDER — HYDROCHLOROTHIAZIDE 25 MG/1
25 TABLET ORAL DAILY
Qty: 90 TABLET | Refills: 3 | Status: SHIPPED | OUTPATIENT
Start: 2020-12-28 | End: 2021-04-28

## 2020-12-28 RX ORDER — ATORVASTATIN CALCIUM 40 MG/1
40 TABLET, FILM COATED ORAL NIGHTLY
Qty: 90 TABLET | Refills: 3 | Status: SHIPPED | OUTPATIENT
Start: 2020-12-28 | End: 2021-12-22

## 2020-12-28 NOTE — PATIENT INSTRUCTIONS
Jinny Ocampo's SCREENING SCHEDULE   Tests on this list are recommended by your physician but may not be covered, or covered at this frequency, by your insurer. Please check with your insurance carrier before scheduling to verify coverage.    ZOFIA ages 73-68) IPPE only No results found for this or any previous visit.  Limited to patients who meet one of the following criteria:   • Men who are 73-68 years old and have smoked more than 100 cigarettes in their lifetime   • Anyone with a family history after 75 There are no preventive care reminders to display for this patient. Please get this Mammogram regularly   Immunizations      Influenza  Covered Annually No orders found for this or any previous visit.  Please get every year    Pneumococcal 13 (Prev

## 2020-12-28 NOTE — PROGRESS NOTES
HPI:   Palak Mendoza is a 81year old female who presents for a Medicare Subsequent Annual Wellness visit (Pt already had Initial Annual Wellness).  Darin Loaiza in hospice now receiving his last transfusion tomorrow.   Transfusions are not helping never smoked tobacco.  Ms. Jason Apodaca does not currently take aspirin. We discussed the risks and benefits of aspirin therapy. Madeline Mancilla is unable to use daily aspirin therapy For the following reasons:    Other: not indicated        CAGE Alcohol scre Cataracts, bilateral (2015), Fibrocystic breast changes of both breasts (6/24/2015), Other and unspecified hyperlipidemia, Plantar fascial fibromatosis, PONV (postoperative nausea and vomiting), and Unspecified essential hypertension.     She  has a past choe midline and thyroid not enlarged, symmetric, no tenderness/mass/nodules  Lungs: clear to auscultation bilaterally  Heart: S1, S2 normal, no murmur, click, rub or gallop, regular rate and rhythm, 3/6 JUAN PABLO murmur is heard at 2nd right intercostal space  Extre agrees to the plan. Reinforced healthy diet, lifestyle, and exercise. Return in 6 months (on 6/28/2021).      Malcolm Can MD, 12/28/2020     General Health     In the past six months, have you lost more than 10 pounds without trying?: 2 - No  Has yo Pelvic      Pap: Every 3 yrs age 21-68 or Pap+HPV every 5 yrs age 33-67, age 72 and older at high risk There are no preventive care reminders to display for this patient.  Update Health Maintenance if applicable    Chlamydia  Annually if high risk No result 06/05/2014 0.66   12/29/2011 0.95     Creatinine (mg/dL)   Date Value   11/04/2020 1.07 (H)    No flowsheet data found. Drug Serum Conc  Annually No results found for: DIGOXIN, DIG, VALP No flowsheet data found.                  Template: [facility] MEDIC

## 2021-01-19 ENCOUNTER — OFFICE VISIT (OUTPATIENT)
Dept: FAMILY MEDICINE CLINIC | Facility: CLINIC | Age: 86
End: 2021-01-19
Payer: MEDICARE

## 2021-01-19 VITALS
RESPIRATION RATE: 16 BRPM | OXYGEN SATURATION: 98 % | HEIGHT: 60.25 IN | BODY MASS INDEX: 31.78 KG/M2 | HEART RATE: 76 BPM | SYSTOLIC BLOOD PRESSURE: 136 MMHG | WEIGHT: 164 LBS | DIASTOLIC BLOOD PRESSURE: 84 MMHG

## 2021-01-19 DIAGNOSIS — R39.15 URINARY URGENCY: Primary | ICD-10-CM

## 2021-01-19 LAB
MULTISTIX LOT#: 1044 NUMERIC
PH, URINE: 6 (ref 4.5–8)
PROTEIN (URINE DIPSTICK): 30 MG/DL
SPECIFIC GRAVITY: 1.02 (ref 1–1.03)
URINE-COLOR: YELLOW
UROBILINOGEN,SEMI-QN: 0.2 MG/DL (ref 0–1.9)

## 2021-01-19 PROCEDURE — 99213 OFFICE O/P EST LOW 20 MIN: CPT | Performed by: FAMILY MEDICINE

## 2021-01-19 PROCEDURE — 81003 URINALYSIS AUTO W/O SCOPE: CPT | Performed by: FAMILY MEDICINE

## 2021-01-19 PROCEDURE — 87086 URINE CULTURE/COLONY COUNT: CPT | Performed by: FAMILY MEDICINE

## 2021-01-19 RX ORDER — ACETAMINOPHEN 500 MG
500 TABLET ORAL
COMMUNITY

## 2021-01-19 RX ORDER — SULFAMETHOXAZOLE AND TRIMETHOPRIM 800; 160 MG/1; MG/1
1 TABLET ORAL 2 TIMES DAILY
Qty: 10 TABLET | Refills: 0 | Status: SHIPPED | OUTPATIENT
Start: 2021-01-19 | End: 2021-04-28 | Stop reason: ALTCHOICE

## 2021-01-19 NOTE — PROGRESS NOTES
[facility] Progress Note    SUBJECTIVE: Parish Robledo 81year old female is here today for Patient presents with:  Urinary Symptoms: there is a burning sensation and urine pressure, started Sunday      Woke up on Sunday and it started with mil • Sulfamethoxazole-TMP -160 MG Oral Tab per tablet Take 1 tablet by mouth 2 (two) times daily. 10 tablet 0   • atorvastatin 40 MG Oral Tab Take 1 tablet (40 mg total) by mouth nightly.  90 tablet 3   • hydrochlorothiazide 25 MG Oral Tab Take 1 table

## 2021-03-04 DIAGNOSIS — Z23 NEED FOR VACCINATION: ICD-10-CM

## 2021-03-09 ENCOUNTER — OFFICE VISIT (OUTPATIENT)
Dept: FAMILY MEDICINE CLINIC | Facility: CLINIC | Age: 86
End: 2021-03-09
Payer: MEDICARE

## 2021-03-09 VITALS
DIASTOLIC BLOOD PRESSURE: 70 MMHG | RESPIRATION RATE: 18 BRPM | OXYGEN SATURATION: 98 % | BODY MASS INDEX: 32.59 KG/M2 | WEIGHT: 166 LBS | HEIGHT: 60 IN | HEART RATE: 57 BPM | SYSTOLIC BLOOD PRESSURE: 120 MMHG

## 2021-03-09 DIAGNOSIS — R35.0 URINE FREQUENCY: Primary | ICD-10-CM

## 2021-03-09 DIAGNOSIS — R50.9 FEBRILE ILLNESS: ICD-10-CM

## 2021-03-09 LAB
APPEARANCE: CLEAR
BILIRUBIN: NEGATIVE
GLUCOSE (URINE DIPSTICK): NEGATIVE MG/DL
KETONES (URINE DIPSTICK): NEGATIVE MG/DL
LEUKOCYTES: NEGATIVE
MULTISTIX LOT#: 5077 NUMERIC
NITRITE, URINE: NEGATIVE
PH, URINE: 5.5 (ref 4.5–8)
PROTEIN (URINE DIPSTICK): NEGATIVE MG/DL
SPECIFIC GRAVITY: 1.02 (ref 1–1.03)
URINE-COLOR: YELLOW
UROBILINOGEN,SEMI-QN: 0.2 MG/DL (ref 0–1.9)

## 2021-03-09 PROCEDURE — 87086 URINE CULTURE/COLONY COUNT: CPT | Performed by: FAMILY MEDICINE

## 2021-03-09 PROCEDURE — 87186 SC STD MICRODIL/AGAR DIL: CPT | Performed by: FAMILY MEDICINE

## 2021-03-09 PROCEDURE — 81003 URINALYSIS AUTO W/O SCOPE: CPT | Performed by: FAMILY MEDICINE

## 2021-03-09 PROCEDURE — 87088 URINE BACTERIA CULTURE: CPT | Performed by: FAMILY MEDICINE

## 2021-03-09 PROCEDURE — 99213 OFFICE O/P EST LOW 20 MIN: CPT | Performed by: FAMILY MEDICINE

## 2021-03-09 RX ORDER — AZITHROMYCIN 250 MG/1
TABLET, FILM COATED ORAL
Qty: 6 TABLET | Refills: 0 | Status: SHIPPED | OUTPATIENT
Start: 2021-03-09 | End: 2021-03-14

## 2021-03-09 NOTE — PROGRESS NOTES
Received her second dose of Covid vaccine Thursday, February 25. The next day developed headache then was having fever and chill. She also had urine frequency. This lasted a few days. Then she was having more trouble urinating.   Denies abdominal pain o acetonide 0.1 % External Cream Apply 1 Application topically 2 (two) times daily. 60 g 1     ALLERGIES:   Amoxicillin; Antihistamines, Loratadine-Type; Codeine; Demerol; Lisinopril; and Zetia [Ezetimibe]  FAMILY HISTORY  No family history on file.     PHYSI

## 2021-03-19 ENCOUNTER — TELEPHONE (OUTPATIENT)
Dept: FAMILY MEDICINE CLINIC | Facility: CLINIC | Age: 86
End: 2021-03-19

## 2021-03-19 NOTE — TELEPHONE ENCOUNTER
Pt is feeling a \"bit better\", still with some pain, not urinating as much, pressure is better. Pt finished the zpak on Sunday  No fever    Please advise, should pt do another round of antibiotics?

## 2021-03-19 NOTE — TELEPHONE ENCOUNTER
Pt notified and expressed understanding, she will push fluids and call back if she is not feeling 100% better

## 2021-03-19 NOTE — TELEPHONE ENCOUNTER
Patient was told to call today to give an update in regards to medication she was given for UTI. She states that it is \"just a bit better\"    Please advise.

## 2021-04-23 ENCOUNTER — TELEPHONE (OUTPATIENT)
Dept: FAMILY MEDICINE CLINIC | Facility: CLINIC | Age: 86
End: 2021-04-23

## 2021-04-23 NOTE — TELEPHONE ENCOUNTER
Pt states no burning or pain with urination, only having frequency. Feeling better from 3/9/21 visit.   F/u appt made 4/28/21

## 2021-04-23 NOTE — TELEPHONE ENCOUNTER
Patient called to say that she wanted to schedule follow up with FELIPA regarding her UTI. He does not have openings until next Friday. She is still experiencing symptoms below.     Symptoms:  Pain and pressure is mostly  gone but still urinating very often

## 2021-04-28 ENCOUNTER — OFFICE VISIT (OUTPATIENT)
Dept: FAMILY MEDICINE CLINIC | Facility: CLINIC | Age: 86
End: 2021-04-28
Payer: MEDICARE

## 2021-04-28 VITALS
RESPIRATION RATE: 18 BRPM | WEIGHT: 168 LBS | HEIGHT: 60 IN | BODY MASS INDEX: 32.98 KG/M2 | DIASTOLIC BLOOD PRESSURE: 64 MMHG | SYSTOLIC BLOOD PRESSURE: 132 MMHG | OXYGEN SATURATION: 100 % | HEART RATE: 66 BPM

## 2021-04-28 DIAGNOSIS — R82.998 URINE WBC INCREASED: ICD-10-CM

## 2021-04-28 DIAGNOSIS — R35.0 URINE FREQUENCY: Primary | ICD-10-CM

## 2021-04-28 DIAGNOSIS — I10 ESSENTIAL HYPERTENSION: ICD-10-CM

## 2021-04-28 PROCEDURE — 87086 URINE CULTURE/COLONY COUNT: CPT | Performed by: FAMILY MEDICINE

## 2021-04-28 PROCEDURE — 87088 URINE BACTERIA CULTURE: CPT | Performed by: FAMILY MEDICINE

## 2021-04-28 PROCEDURE — 99213 OFFICE O/P EST LOW 20 MIN: CPT | Performed by: FAMILY MEDICINE

## 2021-04-28 PROCEDURE — 87186 SC STD MICRODIL/AGAR DIL: CPT | Performed by: FAMILY MEDICINE

## 2021-04-28 PROCEDURE — 81003 URINALYSIS AUTO W/O SCOPE: CPT | Performed by: FAMILY MEDICINE

## 2021-04-28 RX ORDER — IBUPROFEN 200 MG
200 TABLET ORAL EVERY 6 HOURS PRN
COMMUNITY

## 2021-04-28 RX ORDER — HYDROCHLOROTHIAZIDE 25 MG/1
50 TABLET ORAL DAILY
Qty: 180 TABLET | Refills: 3 | Status: SHIPPED | OUTPATIENT
Start: 2021-04-28

## 2021-04-28 NOTE — PROGRESS NOTES
Treated by Dr. Anthony Pickett in January for urinary tract symptoms. Culture negative. Antibiotics were helpful. I saw her in March. Similar symptoms. This time culture grew greater than 100,000 E. coli.   Antibiotics were helpful with the dysuria but her pat ALLERGIES:   Amoxicillin; Antihistamines, Loratadine-Type; Codeine; Demerol; Lisinopril; and Zetia [Ezetimibe]  FAMILY HISTORY  No family history on file.     PHYSICAL EXAM:  /64   Pulse 66   Resp 18   Ht 5' (1.524 m)   Wt 168 lb (76.2 kg)   SpO2

## 2021-04-30 RX ORDER — CIPROFLOXACIN 500 MG/1
500 TABLET, FILM COATED ORAL 2 TIMES DAILY
Qty: 14 TABLET | Refills: 0 | Status: SHIPPED | OUTPATIENT
Start: 2021-04-30 | End: 2021-08-27 | Stop reason: ALTCHOICE

## 2021-05-03 ENCOUNTER — TELEPHONE (OUTPATIENT)
Dept: FAMILY MEDICINE CLINIC | Facility: CLINIC | Age: 86
End: 2021-05-03

## 2021-05-03 NOTE — TELEPHONE ENCOUNTER
Pt confused as she was notified by pharmacy abx prescribed and ready. Dr. Ronak Gill told her he would not be prescribing an abx.   Pls advise

## 2021-05-03 NOTE — TELEPHONE ENCOUNTER
Urine culture released on 4/28/2021 shows greater than 100,000 E. coli. Since her culture is positive I am obligated to treat her with an antibiotic.

## 2021-05-05 ENCOUNTER — HOSPITAL ENCOUNTER (OUTPATIENT)
Dept: CV DIAGNOSTICS | Age: 86
Discharge: HOME | End: 2021-05-05
Attending: INTERNAL MEDICINE
Payer: MEDICARE

## 2021-05-05 DIAGNOSIS — R94.31 ABNORMAL EKG: ICD-10-CM

## 2021-05-05 DIAGNOSIS — E78.00 PURE HYPERCHOLESTEROLEMIA: ICD-10-CM

## 2021-05-05 DIAGNOSIS — R53.83 FATIGUE, UNSPECIFIED TYPE: ICD-10-CM

## 2021-05-05 DIAGNOSIS — I10 BENIGN HYPERTENSION: ICD-10-CM

## 2021-05-05 DIAGNOSIS — I35.0 NONRHEUMATIC AORTIC VALVE STENOSIS: ICD-10-CM

## 2021-05-05 PROCEDURE — 93306 TTE W/DOPPLER COMPLETE: CPT | Performed by: INTERNAL MEDICINE

## 2021-05-06 ENCOUNTER — TELEPHONE (OUTPATIENT)
Dept: CARDIOLOGY | Age: 86
End: 2021-05-06

## 2021-05-11 ENCOUNTER — HOSPITAL ENCOUNTER (OUTPATIENT)
Dept: ULTRASOUND IMAGING | Age: 86
Discharge: HOME | End: 2021-05-11
Attending: FAMILY MEDICINE
Payer: MEDICARE

## 2021-05-11 DIAGNOSIS — R82.998 URINE WBC INCREASED: ICD-10-CM

## 2021-05-11 DIAGNOSIS — R35.0 URINE FREQUENCY: ICD-10-CM

## 2021-05-11 PROCEDURE — 76857 US EXAM PELVIC LIMITED: CPT | Performed by: FAMILY MEDICINE

## 2021-05-25 ENCOUNTER — HOSPITAL ENCOUNTER (OUTPATIENT)
Dept: CV DIAGNOSTICS | Age: 86
Discharge: HOME | End: 2021-05-25
Attending: INTERNAL MEDICINE
Payer: MEDICARE

## 2021-05-25 ENCOUNTER — TELEPHONE (OUTPATIENT)
Dept: FAMILY MEDICINE CLINIC | Facility: CLINIC | Age: 86
End: 2021-05-25

## 2021-05-25 DIAGNOSIS — I73.9 PAD (PERIPHERAL ARTERY DISEASE) (HCC): ICD-10-CM

## 2021-05-25 DIAGNOSIS — I35.0 NONRHEUMATIC AORTIC VALVE STENOSIS: ICD-10-CM

## 2021-05-25 DIAGNOSIS — I65.23 ASYMPTOMATIC CAROTID ARTERY STENOSIS, BILATERAL: ICD-10-CM

## 2021-05-25 DIAGNOSIS — I10 BENIGN HYPERTENSION: ICD-10-CM

## 2021-05-25 DIAGNOSIS — R53.83 FATIGUE, UNSPECIFIED TYPE: ICD-10-CM

## 2021-05-25 DIAGNOSIS — E78.00 PURE HYPERCHOLESTEROLEMIA: ICD-10-CM

## 2021-05-25 DIAGNOSIS — R94.31 ABNORMAL EKG: ICD-10-CM

## 2021-05-25 PROCEDURE — 93925 LOWER EXTREMITY STUDY: CPT | Performed by: INTERNAL MEDICINE

## 2021-05-25 PROCEDURE — X1094 NO CHARGE VISIT: HCPCS | Performed by: INTERNAL MEDICINE

## 2021-05-25 PROCEDURE — 93978 VASCULAR STUDY: CPT | Performed by: INTERNAL MEDICINE

## 2021-05-25 NOTE — TELEPHONE ENCOUNTER
Bladder imaging is normal.  To pursue the work-up further we would need a urologist to perform a cystoscopy, inserting a small camera through the urethra into the bladder to look around.  I can refer you to urology if you are interested.    Written by Neel

## 2021-06-03 DIAGNOSIS — E78.00 PURE HYPERCHOLESTEROLEMIA: ICD-10-CM

## 2021-06-03 DIAGNOSIS — I65.23 ASYMPTOMATIC CAROTID ARTERY STENOSIS, BILATERAL: ICD-10-CM

## 2021-06-03 DIAGNOSIS — R94.31 ABNORMAL EKG: ICD-10-CM

## 2021-06-03 DIAGNOSIS — I10 HYPERTENSION, BENIGN: ICD-10-CM

## 2021-06-03 DIAGNOSIS — I73.9 PAD (PERIPHERAL ARTERY DISEASE) (CMD): ICD-10-CM

## 2021-06-03 DIAGNOSIS — R53.83 FATIGUE, UNSPECIFIED TYPE: ICD-10-CM

## 2021-06-03 DIAGNOSIS — I35.0 NONRHEUMATIC AORTIC VALVE STENOSIS: ICD-10-CM

## 2021-06-04 ENCOUNTER — LAB ENCOUNTER (OUTPATIENT)
Dept: LAB | Age: 86
End: 2021-06-04
Attending: INTERNAL MEDICINE
Payer: MEDICARE

## 2021-06-04 DIAGNOSIS — I65.23 BILATERAL CAROTID ARTERY STENOSIS: Primary | ICD-10-CM

## 2021-06-04 DIAGNOSIS — I73.9 PERIPHERAL VASCULAR DISEASE, UNSPECIFIED (HCC): ICD-10-CM

## 2021-06-04 DIAGNOSIS — I10 ESSENTIAL HYPERTENSION, MALIGNANT: ICD-10-CM

## 2021-06-04 DIAGNOSIS — E78.00 PURE HYPERCHOLESTEROLEMIA: ICD-10-CM

## 2021-06-04 DIAGNOSIS — R53.83 FATIGUE: ICD-10-CM

## 2021-06-04 DIAGNOSIS — I35.0 NODULAR CALCIFIC AORTIC VALVE STENOSIS: ICD-10-CM

## 2021-06-04 DIAGNOSIS — R94.31 NONSPECIFIC ABNORMAL ELECTROCARDIOGRAM (ECG) (EKG): ICD-10-CM

## 2021-06-04 PROCEDURE — 84443 ASSAY THYROID STIM HORMONE: CPT

## 2021-06-04 PROCEDURE — 80053 COMPREHEN METABOLIC PANEL: CPT

## 2021-06-04 PROCEDURE — 80061 LIPID PANEL: CPT

## 2021-06-04 PROCEDURE — 36415 COLL VENOUS BLD VENIPUNCTURE: CPT

## 2021-06-08 ENCOUNTER — OFFICE VISIT (OUTPATIENT)
Dept: CARDIOLOGY | Age: 86
End: 2021-06-08

## 2021-06-08 ENCOUNTER — TELEPHONE (OUTPATIENT)
Dept: CARDIOLOGY | Age: 86
End: 2021-06-08

## 2021-06-08 VITALS
SYSTOLIC BLOOD PRESSURE: 108 MMHG | WEIGHT: 167 LBS | BODY MASS INDEX: 31.53 KG/M2 | HEIGHT: 61 IN | DIASTOLIC BLOOD PRESSURE: 64 MMHG

## 2021-06-08 DIAGNOSIS — E78.00 PURE HYPERCHOLESTEROLEMIA: ICD-10-CM

## 2021-06-08 DIAGNOSIS — R09.89 CAROTID BRUIT, UNSPECIFIED LATERALITY: ICD-10-CM

## 2021-06-08 DIAGNOSIS — R94.31 ABNORMAL EKG: Primary | ICD-10-CM

## 2021-06-08 DIAGNOSIS — I65.23 ASYMPTOMATIC CAROTID ARTERY STENOSIS, BILATERAL: ICD-10-CM

## 2021-06-08 DIAGNOSIS — I35.0 NONRHEUMATIC AORTIC VALVE STENOSIS: ICD-10-CM

## 2021-06-08 DIAGNOSIS — I10 HYPERTENSION, BENIGN: ICD-10-CM

## 2021-06-08 DIAGNOSIS — R06.02 SHORTNESS OF BREATH: ICD-10-CM

## 2021-06-08 PROCEDURE — 99214 OFFICE O/P EST MOD 30 MIN: CPT | Performed by: INTERNAL MEDICINE

## 2021-06-08 RX ORDER — FUROSEMIDE 20 MG/1
TABLET ORAL
Qty: 24 TABLET | Refills: 3 | Status: SHIPPED | OUTPATIENT
Start: 2021-06-08

## 2021-06-08 ASSESSMENT — PATIENT HEALTH QUESTIONNAIRE - PHQ9
CLINICAL INTERPRETATION OF PHQ2 SCORE: NO FURTHER SCREENING NEEDED
SUM OF ALL RESPONSES TO PHQ9 QUESTIONS 1 AND 2: 0
2. FEELING DOWN, DEPRESSED OR HOPELESS: NOT AT ALL
SUM OF ALL RESPONSES TO PHQ9 QUESTIONS 1 AND 2: 0
CLINICAL INTERPRETATION OF PHQ9 SCORE: NO FURTHER SCREENING NEEDED
1. LITTLE INTEREST OR PLEASURE IN DOING THINGS: NOT AT ALL

## 2021-06-11 ENCOUNTER — TELEPHONE (OUTPATIENT)
Dept: CARDIOLOGY | Age: 86
End: 2021-06-11

## 2021-08-27 ENCOUNTER — LAB ENCOUNTER (OUTPATIENT)
Dept: LAB | Age: 86
End: 2021-08-27
Attending: FAMILY MEDICINE
Payer: MEDICARE

## 2021-08-27 ENCOUNTER — OFFICE VISIT (OUTPATIENT)
Dept: FAMILY MEDICINE CLINIC | Facility: CLINIC | Age: 86
End: 2021-08-27
Payer: MEDICARE

## 2021-08-27 VITALS
BODY MASS INDEX: 32.79 KG/M2 | SYSTOLIC BLOOD PRESSURE: 130 MMHG | DIASTOLIC BLOOD PRESSURE: 58 MMHG | HEART RATE: 66 BPM | WEIGHT: 167 LBS | OXYGEN SATURATION: 99 % | HEIGHT: 60 IN | RESPIRATION RATE: 22 BRPM

## 2021-08-27 DIAGNOSIS — I83.893 VARICOSE VEINS OF BOTH LEGS WITH EDEMA: Primary | ICD-10-CM

## 2021-08-27 DIAGNOSIS — R53.83 FATIGUE, UNSPECIFIED TYPE: ICD-10-CM

## 2021-08-27 LAB
BASOPHILS # BLD AUTO: 0.03 X10(3) UL (ref 0–0.2)
BASOPHILS NFR BLD AUTO: 0.3 %
EOSINOPHIL # BLD AUTO: 0.04 X10(3) UL (ref 0–0.7)
EOSINOPHIL NFR BLD AUTO: 0.5 %
ERYTHROCYTE [DISTWIDTH] IN BLOOD BY AUTOMATED COUNT: 13.4 %
HCT VFR BLD AUTO: 35.7 %
HGB BLD-MCNC: 11 G/DL
IMM GRANULOCYTES # BLD AUTO: 0.06 X10(3) UL (ref 0–1)
IMM GRANULOCYTES NFR BLD: 0.7 %
LYMPHOCYTES # BLD AUTO: 1.58 X10(3) UL (ref 1–4)
LYMPHOCYTES NFR BLD AUTO: 18 %
MCH RBC QN AUTO: 28 PG (ref 26–34)
MCHC RBC AUTO-ENTMCNC: 30.8 G/DL (ref 31–37)
MCV RBC AUTO: 90.8 FL
MONOCYTES # BLD AUTO: 0.81 X10(3) UL (ref 0.1–1)
MONOCYTES NFR BLD AUTO: 9.2 %
NEUTROPHILS # BLD AUTO: 6.28 X10 (3) UL (ref 1.5–7.7)
NEUTROPHILS # BLD AUTO: 6.28 X10(3) UL (ref 1.5–7.7)
NEUTROPHILS NFR BLD AUTO: 71.3 %
PLATELET # BLD AUTO: 206 10(3)UL (ref 150–450)
RBC # BLD AUTO: 3.93 X10(6)UL
VIT B12 SERPL-MCNC: 410 PG/ML (ref 193–986)
WBC # BLD AUTO: 8.8 X10(3) UL (ref 4–11)

## 2021-08-27 PROCEDURE — 36415 COLL VENOUS BLD VENIPUNCTURE: CPT

## 2021-08-27 PROCEDURE — 85025 COMPLETE CBC W/AUTO DIFF WBC: CPT

## 2021-08-27 PROCEDURE — 99214 OFFICE O/P EST MOD 30 MIN: CPT | Performed by: FAMILY MEDICINE

## 2021-08-27 PROCEDURE — 82607 VITAMIN B-12: CPT

## 2021-08-27 RX ORDER — FUROSEMIDE 20 MG/1
TABLET ORAL
COMMUNITY
Start: 2021-06-08 | End: 2021-08-27 | Stop reason: ALTCHOICE

## 2021-08-27 NOTE — PROGRESS NOTES
Following up on bilateral leg swelling. Has been through echocardiogram, venous Doppler the lower extremities and of the abdominal aorta. Dr. Gladys Mitchell wonders about the varicose veins in her feet is the likely cause. She has had a sodium level done. Oral Tab Take 500 mg by mouth. • atorvastatin 40 MG Oral Tab Take 1 tablet (40 mg total) by mouth nightly. 90 tablet 3     ALLERGIES:   Amoxicillin; Antihistamines, Loratadine-Type; Codeine; Demerol;  Lisinopril; and Zetia [Ezetimibe]  FAMILY HISTORY  H

## 2021-09-24 ENCOUNTER — OFFICE VISIT (OUTPATIENT)
Dept: SURGERY | Facility: CLINIC | Age: 86
End: 2021-09-24
Payer: MEDICARE

## 2021-09-24 VITALS — HEIGHT: 61 IN | WEIGHT: 167 LBS | TEMPERATURE: 97 F | BODY MASS INDEX: 31.53 KG/M2

## 2021-09-24 DIAGNOSIS — I83.819 VARICOSE VEINS WITH PAIN: Primary | ICD-10-CM

## 2021-09-24 DIAGNOSIS — I83.893 VARICOSE VEINS OF LEG WITH SWELLING, BILATERAL: ICD-10-CM

## 2021-09-24 DIAGNOSIS — I83.899 VARICOSE VEINS WITH COMPLICATIONS: ICD-10-CM

## 2021-09-24 PROCEDURE — 99204 OFFICE O/P NEW MOD 45 MIN: CPT | Performed by: SURGERY

## 2021-10-06 NOTE — H&P
New Patient Visit Note       Active Problems      1. Varicose veins with pain    2. Varicose veins of leg with swelling, bilateral    3.  Varicose veins with complications        Chief Complaint   Patient presents with:  Varicose Veins: varicose vein consul only on Holidays       Current Outpatient Medications:   •  ibuprofen 200 MG Oral Tab, Take 200 mg by mouth every 6 (six) hours as needed for Pain., Disp: , Rfl:   •  hydrochlorothiazide 25 MG Oral Tab, Take 2 tablets (50 mg total) by mouth daily. , Disp: 1 Abdominal:      General: Bowel sounds are normal. There is no distension. Palpations: Abdomen is soft. Tenderness: There is no abdominal tenderness. Musculoskeletal:         General: No deformity. Normal range of motion.    Skin:     General: completed to discuss potential treatment options. The risks, benefits, complications, possible outcomes and alternatives of the procedure were explained to the patient in detail.    Expected postoperative pain, recuperation and postoperative cou

## 2021-10-20 ENCOUNTER — HOSPITAL ENCOUNTER (OUTPATIENT)
Dept: ULTRASOUND IMAGING | Age: 86
Discharge: HOME | End: 2021-10-20
Attending: SURGERY
Payer: MEDICARE

## 2021-10-20 DIAGNOSIS — I83.893 VARICOSE VEINS OF LEG WITH SWELLING, BILATERAL: ICD-10-CM

## 2021-10-20 DIAGNOSIS — I83.819 VARICOSE VEINS WITH PAIN: ICD-10-CM

## 2021-10-20 PROCEDURE — 93970 EXTREMITY STUDY: CPT | Performed by: SURGERY

## 2021-11-05 ENCOUNTER — OFFICE VISIT (OUTPATIENT)
Dept: SURGERY | Facility: CLINIC | Age: 86
End: 2021-11-05
Payer: MEDICARE

## 2021-11-05 VITALS — WEIGHT: 167 LBS | TEMPERATURE: 97 F | HEIGHT: 61 IN | BODY MASS INDEX: 31.53 KG/M2

## 2021-11-05 DIAGNOSIS — I83.893 VARICOSE VEINS OF LEG WITH SWELLING, BILATERAL: ICD-10-CM

## 2021-11-05 DIAGNOSIS — I87.2 SAPHENOFEMORAL VENOUS REFLUX: Primary | ICD-10-CM

## 2021-11-05 DIAGNOSIS — I83.819 VARICOSE VEINS WITH PAIN: ICD-10-CM

## 2021-11-05 PROCEDURE — 99215 OFFICE O/P EST HI 40 MIN: CPT | Performed by: SURGERY

## 2021-11-05 NOTE — PROGRESS NOTES
Follow Up Visit Note       Active Problems      1. Saphenofemoral venous reflux    2. Varicose veins with pain    3.  Varicose veins of leg with swelling, bilateral          Chief Complaint   Patient presents with:  Varicose Veins: EST - F/U US RESULTS Vein Tab, Take 2 tablets (50 mg total) by mouth daily. , Disp: 180 tablet, Rfl: 3  •  acetaminophen 500 MG Oral Tab, Take 500 mg by mouth., Disp: , Rfl:   •  atorvastatin 40 MG Oral Tab, Take 1 tablet (40 mg total) by mouth nightly., Disp: 90 tablet, Rfl: 3 motion. Skin:     General: Skin is warm and dry. Findings: No rash. Neurological:      Mental Status: She is alert and oriented to person, place, and time.      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

## 2021-12-08 ENCOUNTER — TELEPHONE (OUTPATIENT)
Dept: CARDIOLOGY | Age: 86
End: 2021-12-08

## 2021-12-10 ENCOUNTER — LAB ENCOUNTER (OUTPATIENT)
Dept: LAB | Age: 86
End: 2021-12-10
Attending: INTERNAL MEDICINE
Payer: MEDICARE

## 2021-12-10 DIAGNOSIS — I10 ESSENTIAL HYPERTENSION, MALIGNANT: ICD-10-CM

## 2021-12-10 DIAGNOSIS — E78.00 PURE HYPERCHOLESTEROLEMIA: Primary | ICD-10-CM

## 2021-12-10 PROCEDURE — 80061 LIPID PANEL: CPT

## 2021-12-10 PROCEDURE — 80053 COMPREHEN METABOLIC PANEL: CPT

## 2021-12-10 PROCEDURE — 36415 COLL VENOUS BLD VENIPUNCTURE: CPT

## 2021-12-13 ENCOUNTER — APPOINTMENT (OUTPATIENT)
Dept: CARDIOLOGY | Age: 86
End: 2021-12-13

## 2021-12-22 ENCOUNTER — LAB ENCOUNTER (OUTPATIENT)
Dept: LAB | Age: 86
End: 2021-12-22
Attending: FAMILY MEDICINE
Payer: MEDICARE

## 2021-12-22 ENCOUNTER — OFFICE VISIT (OUTPATIENT)
Dept: FAMILY MEDICINE CLINIC | Facility: CLINIC | Age: 86
End: 2021-12-22
Payer: MEDICARE

## 2021-12-22 VITALS
HEART RATE: 65 BPM | BODY MASS INDEX: 32.1 KG/M2 | RESPIRATION RATE: 18 BRPM | OXYGEN SATURATION: 100 % | WEIGHT: 170 LBS | HEIGHT: 61 IN | DIASTOLIC BLOOD PRESSURE: 62 MMHG | SYSTOLIC BLOOD PRESSURE: 132 MMHG

## 2021-12-22 DIAGNOSIS — L84 CORN OF FOOT: ICD-10-CM

## 2021-12-22 DIAGNOSIS — E87.6 HYPOKALEMIA: ICD-10-CM

## 2021-12-22 DIAGNOSIS — E87.6 HYPOKALEMIA: Primary | ICD-10-CM

## 2021-12-22 DIAGNOSIS — H00.011 HORDEOLUM EXTERNUM OF RIGHT UPPER EYELID: ICD-10-CM

## 2021-12-22 DIAGNOSIS — E78.00 PURE HYPERCHOLESTEROLEMIA: ICD-10-CM

## 2021-12-22 DIAGNOSIS — Z00.00 ENCOUNTER FOR ANNUAL HEALTH EXAMINATION: ICD-10-CM

## 2021-12-22 PROCEDURE — G0439 PPPS, SUBSEQ VISIT: HCPCS | Performed by: FAMILY MEDICINE

## 2021-12-22 PROCEDURE — 84132 ASSAY OF SERUM POTASSIUM: CPT

## 2021-12-22 PROCEDURE — 36415 COLL VENOUS BLD VENIPUNCTURE: CPT

## 2021-12-22 RX ORDER — ATORVASTATIN CALCIUM 40 MG/1
40 TABLET, FILM COATED ORAL NIGHTLY
Qty: 90 TABLET | Refills: 3 | Status: SHIPPED | OUTPATIENT
Start: 2021-12-22 | End: 2022-01-24

## 2021-12-22 NOTE — PROGRESS NOTES
HPI:   Lousie Weaver is a 81year old female who presents for a Medicare Subsequent Annual Wellness visit (Pt already had Initial Annual Wellness). Was seen by cardiology Dr. Janeth Suarez. Potassium level was noted to be slightly low at 3.3.   In reviewin Cataracts, bilateral     Aortic stenosis     Aortic valve calcification     Varicose veins of both legs with edema     Hypokalemia    Wt Readings from Last 3 Encounters:  12/22/21 : 170 lb (77.1 kg)  11/05/21 : 167 lb (75.8 kg)  09/24/21 : 167 lb (75.8 kg) (7/2015); santana biopsy stereo nodule 2 site bilat (cpt=19081/99284) (6/2015); and santana biopsy stereo nodule 1 site right (cpt=19081) (02/2017). Her family history is not on file. SOCIAL HISTORY:   She  reports that she has never smoked.  She has never use FLUAD High Dose 65 yr and older (49411) 09/24/2018   • Influenza 10/01/2011, 10/15/2012, 10/07/2014, 10/26/2016, 10/31/2017, 10/13/2021   • Pneumococcal (Prevnar 13) 08/26/2015, 10/26/2016   • Pneumovax 23 03/23/2012   • Zoster Vaccine Live (Zostavax) 06/1 Tests on this list are recommended by your physician but may not be covered, or covered at this frequency, by your insurer. Please check with your insurance carrier before scheduling to verify coverage.    PREVENTATIVE SERVICES FREQUENCY &  COVERAGE DET years for women at normal risk;  Annually if at high risk -  No recommendations at this time    Chlamydia Annually if high risk -  No recommendations at this time   Screening Mammogram    Mammogram     Recommend annually for all female patients aged 36 and

## 2021-12-22 NOTE — PATIENT INSTRUCTIONS
Jinny Ocampo's SCREENING SCHEDULE   Tests on this list are recommended by your physician but may not be covered, or covered at this frequency, by your insurer. Please check with your insurance carrier before scheduling to verify coverage.    PREVENT and Pelvic    Pap   Covered every 2 years for women at normal risk;  Annually if at high risk -  No recommendations at this time    Chlamydia Annually if high risk -  No recommendations at this time   Screening Mammogram    Mammogram     Recommend annually Public Health. This site has a lot of good information including definitions of the different types of Advance Directives.  It also has the State forms available on it's website for anyone to review and print using their home computer and printer. (the form

## 2022-01-24 DIAGNOSIS — E78.00 PURE HYPERCHOLESTEROLEMIA: ICD-10-CM

## 2022-01-24 RX ORDER — ATORVASTATIN CALCIUM 40 MG/1
TABLET, FILM COATED ORAL
Qty: 90 TABLET | Refills: 3 | Status: SHIPPED | OUTPATIENT
Start: 2022-01-24

## 2022-02-02 ENCOUNTER — TELEPHONE (OUTPATIENT)
Dept: SURGERY | Facility: CLINIC | Age: 87
End: 2022-02-02

## 2022-02-02 NOTE — TELEPHONE ENCOUNTER
Patient cancelled her Venaseal ablation through my chart on 2-1-2022. Notified our ultrasound tech and Dr. Narciso Garcia. Not rescheduled at this time.

## 2022-02-16 ENCOUNTER — OFFICE VISIT (OUTPATIENT)
Dept: SURGERY | Facility: CLINIC | Age: 87
End: 2022-02-16
Payer: MEDICARE

## 2022-02-16 VITALS
BODY MASS INDEX: 32.1 KG/M2 | TEMPERATURE: 97 F | HEIGHT: 61 IN | HEART RATE: 60 BPM | DIASTOLIC BLOOD PRESSURE: 70 MMHG | SYSTOLIC BLOOD PRESSURE: 167 MMHG | WEIGHT: 170 LBS

## 2022-02-16 DIAGNOSIS — I83.819 VARICOSE VEINS WITH PAIN: Primary | ICD-10-CM

## 2022-02-16 DIAGNOSIS — I83.899 VARICOSE VEINS WITH COMPLICATIONS: ICD-10-CM

## 2022-02-16 DIAGNOSIS — I87.2 SAPHENOFEMORAL VENOUS REFLUX: ICD-10-CM

## 2022-02-16 PROCEDURE — 36475 ENDOVENOUS RF 1ST VEIN: CPT | Performed by: SURGERY

## 2022-02-16 NOTE — PROCEDURES
Patient    Date of procedure:   February 16, 2022    Preoperative diagnosis:   Left symptomatic greater saphenous vein insufficiency    Postoperative diagnosis:   Same    Procedure: Endovenous ablation of the leftgreater saphenous vein with VenaSeal closure system    Surgeon:   Kyle Vu MD,FACS    Anesthesia:  Local    History of present illness: The patient was initially evaluated in the office for lower extremity symptoms of fatigue, heaviness and aching suggestive of symptomatic varicose veins. Examination reveals clinical reflux of the saphenous vein. The patient reports pain and discomfort in the area of the varicosities. Venous insufficiency Doppler studies demonstrate venous insufficiency and saphenous vein incompetence. Treatment options were reviewed in detail with the patient. The patient has decided to proceed with endovenous ablation of the greater saphenous vein. The risks, benefits, complications, possible outcomes and alternatives of the procedure were explained to the patient in detail. Expected postoperative pain, recuperation and postoperative course was also reviewed. All questions from the patient were answered in detail. The patient did verbalize understanding and does not have any further questions at this time. The patient does wish to proceed with the proposed procedure. Operative summary:   The patient was positioned on the procedure table. Preprocedural venous mapping was performed. The tributary veins as well as the saphenous vein was identified and mapped. The patient's lower extremity was prepped and draped in the usual sterile fashion. Beginning at the most distal location near the distal calf, 1% lidocaine was injected into the skin. The saphenous vein was then cannulated under ultrasound guidance. The guidewire was passed using Seldinger technique through the needle into the saphenous vein.    As the greater saphenous vein was quite tortuous, the wire could not be passed beyond the medial.  Therefore the decision was made to treat the vein in 2 sections and accessed the greater saphenous vein above the knee to treat the more proximal section. The position of the guidewire within the lumen of the vein was confirmed sonographically. Again using local anesthesia, a small incision was made at the entrance point in the skin. The dilator and sheath were then passed over the guidewire again using Seldinger technique and ultrasound guidance. The guidewire and dilator were withdrawn leaving the sheath in place. There was good flow and flush from the side-port with no evidence of resistance. The endovascular ablation catheter with inner cannula was then placed through the sheath and advanced into position. This was again performed under ultrasound guidance. The catheter was confirmed to be in an intraluminal position. The tip of the catheter was confirmed to be 5 cm from the knee by ultrasound imaging. The ablation was then performed. The cyanoacrylate adhesive was precisely primed into the 5 [place]ian delivery catheter prior to deployment in the vein. Beginning proximally, 2 aliquots of adhesive were deployed 1 cm apart. Subsequently 3 minutes of firm compression was held in the area of the junction to the deep system. After this initial deployment, one aliquot was dispensed every 3 cm distally. After the third deployment, 30 seconds of compression was held along the course of the vein to complete ablation. Sequence was performed along the entire length of the vein. Having treated the entire length of the vein, the catheter and introducer sheath were removed from the access site. Manual compression was held over the incision. Good hemostasis was achieved with manual compression. Repeat ultrasound imaging along the entire area of treatment was performed to confirm complete coaptation and closure of the treated vein.   The junction into the deep system was again examined sonographically. There was no sonographic evidence of extension of the adhesive into the deep system. The second segment was then treated in a similar fashion. The vein was entered in the distal thigh. The saphenous vein was then cannulated under ultrasound guidance. The guidewire was passed using Seldinger technique through the needle into the saphenous vein. Again using local anesthesia, a small incision was made at the entrance point in the skin. The dilator and sheath were then passed over the guidewire again using Seldinger technique and ultrasound guidance. The guidewire and dilator were withdrawn leaving the sheath in place. There was good flow and flush from the side-port with no evidence of resistance. The endovascular ablation catheter with inner cannula was then placed through the sheath and advanced into position. This was again performed under ultrasound guidance. The catheter was confirmed to be in an intraluminal position. The tip of the catheter was confirmed to be 5 cm from the saphenofemoral junction. The total length of the vein treated was approximately 25  cm using a total of 2cc cyanoacrylate adhesive. The patient tolerated the procedure well. The patient did not experience any immediate adverse reactions. Steri-Strips were placed across the skin incision. A sterile dressing was subsequently placed. Discharge instructions were given to the patient. The patient will follow-up with a routine postprocedural ultrasound in 7 to 10 days to confirm closure and coaptation of the treated vein. The patient will follow up with me for repeat examination in the next 2 to 3 weeks. The patient did not have any further questions at the conclusion of this procedure.   The patient was discharged from the procedure room in stable condition    Lizzy Bernal MD,FACS

## 2022-03-08 ENCOUNTER — OFFICE VISIT (OUTPATIENT)
Dept: SURGERY | Facility: CLINIC | Age: 87
End: 2022-03-08
Payer: MEDICARE

## 2022-03-08 VITALS — WEIGHT: 170 LBS | BODY MASS INDEX: 32.1 KG/M2 | HEIGHT: 61 IN | TEMPERATURE: 97 F

## 2022-03-08 DIAGNOSIS — I87.2 SAPHENOFEMORAL VENOUS REFLUX: ICD-10-CM

## 2022-03-08 DIAGNOSIS — I83.899 VARICOSE VEINS WITH COMPLICATIONS: ICD-10-CM

## 2022-03-08 DIAGNOSIS — I83.893 VARICOSE VEINS OF LEG WITH SWELLING, BILATERAL: ICD-10-CM

## 2022-03-08 DIAGNOSIS — I83.819 VARICOSE VEINS WITH PAIN: Primary | ICD-10-CM

## 2022-03-08 PROCEDURE — 99214 OFFICE O/P EST MOD 30 MIN: CPT | Performed by: SURGERY

## 2022-05-04 ENCOUNTER — OFFICE VISIT (OUTPATIENT)
Dept: SURGERY | Facility: CLINIC | Age: 87
End: 2022-05-04
Payer: MEDICARE

## 2022-05-04 VITALS
WEIGHT: 168 LBS | BODY MASS INDEX: 31.72 KG/M2 | TEMPERATURE: 97 F | HEART RATE: 59 BPM | SYSTOLIC BLOOD PRESSURE: 131 MMHG | DIASTOLIC BLOOD PRESSURE: 82 MMHG | HEIGHT: 61 IN

## 2022-05-04 DIAGNOSIS — I83.819 VARICOSE VEINS WITH PAIN: Primary | ICD-10-CM

## 2022-05-04 DIAGNOSIS — I83.813 VARICOSE VEINS OF BILATERAL LOWER EXTREMITIES WITH PAIN: ICD-10-CM

## 2022-05-04 PROCEDURE — 36475 ENDOVENOUS RF 1ST VEIN: CPT | Performed by: SURGERY

## 2022-05-04 NOTE — PROCEDURES
Patient    Date of procedure: May 4, 2022    Preoperative diagnosis:   right symptomatic greater saphenous vein insufficiency    Postoperative diagnosis:   Same    Procedure: Endovenous ablation of the right greater saphenous vein with VenaSeal closure system    Surgeon:   Akilah Harley MD,FACS    Anesthesia:  Local    History of present illness: The patient was initially evaluated in the office for lower extremity symptoms of fatigue, heaviness and aching suggestive of symptomatic varicose veins. Examination reveals clinical reflux of the saphenous vein. The patient reports pain and discomfort in the area of the varicosities. Venous insufficiency Doppler studies demonstrate venous insufficiency and saphenous vein incompetence. Treatment options were reviewed in detail with the patient. The patient has decided to proceed with endovenous ablation of the greater saphenous vein. The risks, benefits, complications, possible outcomes and alternatives of the procedure were explained to the patient in detail. Expected postoperative pain, recuperation and postoperative course was also reviewed. All questions from the patient were answered in detail. The patient did verbalize understanding and does not have any further questions at this time. The patient does wish to proceed with the proposed procedure. Operative summary:   The patient was positioned on the procedure table. Preprocedural venous mapping was performed. The tributary veins as well as the saphenous vein was identified and mapped. The patient's lower extremity was prepped and draped in the usual sterile fashion. Beginning at the most distal location mid medial right calf, 1% lidocaine was injected into the skin. The saphenous vein was then cannulated under ultrasound guidance. The guidewire was passed using Seldinger technique through the needle into the saphenous vein. The needle was then withdrawn.   The position of the guidewire within the lumen of the vein was confirmed sonographically. As the vein was very small caliber and quite tortuous, the guidewire was only able to be passed for short distance to about the level of the knee. Again using local anesthesia, a small incision was made at the entrance point in the skin. The dilator and sheath were then passed over the guidewire again using Seldinger technique and ultrasound guidance. The guidewire and dilator were withdrawn leaving the sheath in place. There was good flow and flush from the side-port with no evidence of resistance. The endovascular ablation catheter with inner cannula was then placed through the sheath and advanced as far as possible and again this was about the level of the knee. This was again performed under ultrasound guidance. The catheter was confirmed to be in an intraluminal position. The ablation was then performed. The cyanoacrylate adhesive was precisely primed into the 5 [place]ic delivery catheter prior to deployment in the vein. Beginning proximally, 2 aliquots of adhesive were deployed 1 cm apart. Subsequently 3 minutes of firm compression was held in the area of the junction to the deep system. After this initial deployment, one aliquot was dispensed every 3 cm distally. After the third deployment, 30 seconds of compression was held along the course of the vein to complete ablation. Sequence was performed along the entire length of the vein. Having treated the available length of the vein, the catheter and introducer sheath were removed from the access site. Manual compression was held over the incision. Good hemostasis was achieved with manual compression. Repeat ultrasound imaging along the entire area of treatment was performed to confirm complete coaptation and closure of the treated vein. The junction into the deep system was again examined sonographically.   There was no sonographic evidence of extension of the adhesive into the deep system. A short segment of the greater saphenous vein from the mid calf to just above the level of the knee was treated using a total of one cyanoacrylate adhesive. The patient tolerated the procedure well. The patient did not experience any immediate adverse reactions. Steri-Strips were placed across the skin incision. A sterile dressing was subsequently placed. Discharge instructions were given to the patient. As the treatment segment was below the knee, a follow-up postprocedural ultrasound will not be ordered. The patient will follow up with me for repeat examination in the next 2 to 3 weeks. The patient did not have any further questions at the conclusion of this procedure.   The patient was discharged from the procedure room in stable condition    Maxie Meckel MD,FACS

## 2022-05-11 RX ORDER — HYDROCHLOROTHIAZIDE 25 MG/1
TABLET ORAL
Qty: 180 TABLET | Refills: 3 | Status: SHIPPED | OUTPATIENT
Start: 2022-05-11

## 2022-05-19 ENCOUNTER — OFFICE VISIT (OUTPATIENT)
Dept: SURGERY | Facility: CLINIC | Age: 87
End: 2022-05-19
Payer: MEDICARE

## 2022-05-19 VITALS — SYSTOLIC BLOOD PRESSURE: 155 MMHG | TEMPERATURE: 97 F | HEART RATE: 61 BPM | DIASTOLIC BLOOD PRESSURE: 77 MMHG

## 2022-05-19 DIAGNOSIS — I83.819 VARICOSE VEINS WITH PAIN: ICD-10-CM

## 2022-05-19 DIAGNOSIS — I80.01 THROMBOPHLEBITIS OF SUPERFICIAL VEINS OF RIGHT LOWER EXTREMITY: ICD-10-CM

## 2022-05-19 DIAGNOSIS — I87.2 SAPHENOFEMORAL VENOUS REFLUX: ICD-10-CM

## 2022-05-19 DIAGNOSIS — I83.813 VARICOSE VEINS OF BILATERAL LOWER EXTREMITIES WITH PAIN: ICD-10-CM

## 2022-05-19 DIAGNOSIS — I83.893 VARICOSE VEINS OF BOTH LEGS WITH EDEMA: Primary | ICD-10-CM

## 2022-05-19 PROCEDURE — 99215 OFFICE O/P EST HI 40 MIN: CPT | Performed by: SURGERY

## 2022-05-20 ENCOUNTER — HOSPITAL ENCOUNTER (OUTPATIENT)
Dept: ULTRASOUND IMAGING | Age: 87
Discharge: HOME | End: 2022-05-20
Attending: SURGERY
Payer: MEDICARE

## 2022-05-20 DIAGNOSIS — I80.01 THROMBOPHLEBITIS OF SUPERFICIAL VEINS OF RIGHT LOWER EXTREMITY: ICD-10-CM

## 2022-05-20 PROCEDURE — 93971 EXTREMITY STUDY: CPT | Performed by: SURGERY

## 2022-05-23 ENCOUNTER — TELEPHONE (OUTPATIENT)
Dept: SURGERY | Facility: CLINIC | Age: 87
End: 2022-05-23

## 2022-05-23 NOTE — TELEPHONE ENCOUNTER
Explained to pt to contact PCP for referral to ortho regarding cyst on US of right popliteal area.  Pt VU

## 2022-05-24 ENCOUNTER — HOSPITAL ENCOUNTER (OUTPATIENT)
Dept: CV DIAGNOSTICS | Age: 87
Discharge: HOME | End: 2022-05-24
Attending: INTERNAL MEDICINE
Payer: MEDICARE

## 2022-05-24 DIAGNOSIS — R94.31 ABNORMAL ELECTROCARDIOGRAM: ICD-10-CM

## 2022-05-24 PROCEDURE — 93306 TTE W/DOPPLER COMPLETE: CPT | Performed by: INTERNAL MEDICINE

## 2022-05-27 ENCOUNTER — TELEPHONE (OUTPATIENT)
Dept: FAMILY MEDICINE CLINIC | Facility: CLINIC | Age: 87
End: 2022-05-27

## 2022-05-27 DIAGNOSIS — M71.21 BAKER'S CYST, RIGHT: Primary | ICD-10-CM

## 2022-06-02 ENCOUNTER — LAB ENCOUNTER (OUTPATIENT)
Dept: LAB | Age: 87
End: 2022-06-02
Attending: INTERNAL MEDICINE
Payer: MEDICARE

## 2022-06-02 DIAGNOSIS — E78.00 PURE HYPERCHOLESTEROLEMIA: ICD-10-CM

## 2022-06-02 DIAGNOSIS — I10 HYPERTENSION, BENIGN: ICD-10-CM

## 2022-06-02 DIAGNOSIS — R60.0 LOCALIZED EDEMA: ICD-10-CM

## 2022-06-02 DIAGNOSIS — I65.23 ASYMPTOMATIC CAROTID ARTERY STENOSIS, BILATERAL: Primary | ICD-10-CM

## 2022-06-02 LAB
ALBUMIN SERPL-MCNC: 3.6 G/DL (ref 3.4–5)
ALBUMIN/GLOB SERPL: 1 {RATIO} (ref 1–2)
ALP LIVER SERPL-CCNC: 112 U/L
ALT SERPL-CCNC: 15 U/L
ANION GAP SERPL CALC-SCNC: 6 MMOL/L (ref 0–18)
AST SERPL-CCNC: 16 U/L (ref 15–37)
BILIRUB SERPL-MCNC: 0.8 MG/DL (ref 0.1–2)
BUN BLD-MCNC: 24 MG/DL (ref 7–18)
CALCIUM BLD-MCNC: 9.6 MG/DL (ref 8.5–10.1)
CHLORIDE SERPL-SCNC: 109 MMOL/L (ref 98–112)
CHOLEST SERPL-MCNC: 180 MG/DL (ref ?–200)
CO2 SERPL-SCNC: 28 MMOL/L (ref 21–32)
CREAT BLD-MCNC: 1.08 MG/DL
FASTING PATIENT LIPID ANSWER: YES
FASTING STATUS PATIENT QL REPORTED: YES
GLOBULIN PLAS-MCNC: 3.6 G/DL (ref 2.8–4.4)
GLUCOSE BLD-MCNC: 100 MG/DL (ref 70–99)
HDLC SERPL-MCNC: 55 MG/DL (ref 40–59)
LDLC SERPL CALC-MCNC: 98 MG/DL (ref ?–100)
NONHDLC SERPL-MCNC: 125 MG/DL (ref ?–130)
OSMOLALITY SERPL CALC.SUM OF ELEC: 300 MOSM/KG (ref 275–295)
POTASSIUM SERPL-SCNC: 3.8 MMOL/L (ref 3.5–5.1)
PROT SERPL-MCNC: 7.2 G/DL (ref 6.4–8.2)
SODIUM SERPL-SCNC: 143 MMOL/L (ref 136–145)
TRIGL SERPL-MCNC: 158 MG/DL (ref 30–149)
VLDLC SERPL CALC-MCNC: 26 MG/DL (ref 0–30)

## 2022-06-02 PROCEDURE — 80053 COMPREHEN METABOLIC PANEL: CPT

## 2022-06-02 PROCEDURE — 36415 COLL VENOUS BLD VENIPUNCTURE: CPT

## 2022-06-02 PROCEDURE — 80061 LIPID PANEL: CPT

## 2022-06-03 ENCOUNTER — OFFICE VISIT (OUTPATIENT)
Dept: SURGERY | Facility: CLINIC | Age: 87
End: 2022-06-03
Payer: MEDICARE

## 2022-06-03 VITALS — TEMPERATURE: 99 F | HEART RATE: 60 BPM | SYSTOLIC BLOOD PRESSURE: 143 MMHG | DIASTOLIC BLOOD PRESSURE: 60 MMHG

## 2022-06-03 DIAGNOSIS — I83.819 VARICOSE VEINS WITH PAIN: Primary | ICD-10-CM

## 2022-06-03 DIAGNOSIS — I83.899 VARICOSE VEINS WITH COMPLICATIONS: ICD-10-CM

## 2022-06-03 DIAGNOSIS — I83.893 VARICOSE VEINS OF BOTH LEGS WITH EDEMA: ICD-10-CM

## 2022-06-03 PROCEDURE — 36471 NJX SCLRSNT MLT INCMPTNT VN: CPT | Performed by: SURGERY

## 2022-06-24 ENCOUNTER — HOSPITAL ENCOUNTER (OUTPATIENT)
Dept: ULTRASOUND IMAGING | Age: 87
Discharge: HOME | End: 2022-06-24
Attending: INTERNAL MEDICINE
Payer: MEDICARE

## 2022-06-24 ENCOUNTER — OFFICE VISIT (OUTPATIENT)
Facility: LOCATION | Age: 87
End: 2022-06-24
Payer: MEDICARE

## 2022-06-24 VITALS
WEIGHT: 168 LBS | TEMPERATURE: 98 F | HEIGHT: 61 IN | DIASTOLIC BLOOD PRESSURE: 65 MMHG | HEART RATE: 59 BPM | SYSTOLIC BLOOD PRESSURE: 141 MMHG | BODY MASS INDEX: 31.72 KG/M2

## 2022-06-24 DIAGNOSIS — I83.893 VARICOSE VEINS OF BOTH LEGS WITH EDEMA: ICD-10-CM

## 2022-06-24 DIAGNOSIS — I83.899 VARICOSE VEINS WITH COMPLICATIONS: ICD-10-CM

## 2022-06-24 DIAGNOSIS — I65.23 BILATERAL CAROTID ARTERY STENOSIS: ICD-10-CM

## 2022-06-24 DIAGNOSIS — I83.819 VARICOSE VEINS WITH PAIN: Primary | ICD-10-CM

## 2022-06-24 PROCEDURE — 93880 EXTRACRANIAL BILAT STUDY: CPT | Performed by: INTERNAL MEDICINE

## 2022-06-24 PROCEDURE — 36471 NJX SCLRSNT MLT INCMPTNT VN: CPT | Performed by: SURGERY

## 2022-06-24 NOTE — PROCEDURES
Date of Procedure:        June 24, 2020    Pre op diagnosis: Varicose Veins-reticular veins-spider angiomata    Post op diagnosis: Same    Procedure: Injection sclerotherapy of Varicose Veins-right leg    Surgeon:  Judy Lozano    History of present illness: This patient was found to have symptomatic varicose veins/spider angiomata on clinical exam. The varicose veins are determined to be amenable to sclerotherapy treatment. The patient presents at this time for sclerotherapy. The potential risks and benefits of this procedure were reviewed in detail with the patient. The risks, benefits, complications, possible outcomes and alternatives of the procedure were explained to the patient in detail. Expected postoperative pain, recuperation and postoperative course was also reviewed. All questions from the patient were answered in detail. The patient does not have any further questions at this time and does wish to proceed with injection sclerotherapy. Operative findings:   The patient had sclerotherapy injections into multiple veins including spider veins, reticular veins, and medium size varicosities. Operative Summary:   Each location was treated in the same fashion. The patient indicated which clusters were most symptomatic and these were addressed. Clusters treated today:   Anterior calf and knee x4    The site was prepped with alcohol. The vein was cannulated and good flash of return blood was seen. The vein was then injected with a sclerotherapy agent. Great care was taken to avoid extravasation of fluid from the veins. The procedure was repeated in a similar fashion for each venous cluster. Sterile cotton dressings with compression was placed at the puncture sites after the procedure. The compression stockings were placed immediately after the procedure was completed.     The patient remained in stable condition after the procedure and was observed in our office after the procedure for an appropriate period of time. Discharge and after-care directions were given. The patient states understanding and has no questions at this time. A follow up appointment in two weeks will be made.      EBL: none

## 2022-07-09 ENCOUNTER — APPOINTMENT (OUTPATIENT)
Dept: GENERAL RADIOLOGY | Age: 87
End: 2022-07-09
Attending: EMERGENCY MEDICINE
Payer: MEDICARE

## 2022-07-09 ENCOUNTER — HOSPITAL ENCOUNTER (EMERGENCY)
Age: 87
Discharge: HOME | End: 2022-07-09
Attending: EMERGENCY MEDICINE
Payer: MEDICARE

## 2022-07-09 VITALS
OXYGEN SATURATION: 100 % | HEART RATE: 73 BPM | SYSTOLIC BLOOD PRESSURE: 160 MMHG | RESPIRATION RATE: 15 BRPM | TEMPERATURE: 98 F | DIASTOLIC BLOOD PRESSURE: 72 MMHG

## 2022-07-09 DIAGNOSIS — S92.355A CLOSED NONDISPLACED FRACTURE OF FIFTH METATARSAL BONE OF LEFT FOOT, INITIAL ENCOUNTER: Primary | ICD-10-CM

## 2022-07-09 PROCEDURE — 99284 EMERGENCY DEPT VISIT MOD MDM: CPT

## 2022-07-09 PROCEDURE — 99283 EMERGENCY DEPT VISIT LOW MDM: CPT

## 2022-07-09 PROCEDURE — 29515 APPLICATION SHORT LEG SPLINT: CPT

## 2022-07-09 PROCEDURE — 73630 X-RAY EXAM OF FOOT: CPT | Performed by: EMERGENCY MEDICINE

## 2022-07-11 ENCOUNTER — TELEPHONE (OUTPATIENT)
Dept: ORTHOPEDICS CLINIC | Facility: CLINIC | Age: 87
End: 2022-07-11

## 2022-07-11 NOTE — TELEPHONE ENCOUNTER
Last Imaging  XR FOOT, COMPLETE (MIN 3 VIEWS), LEFT (CPT=73630)  Narrative: PROCEDURE:  XR FOOT, COMPLETE (MIN 3 VIEWS), LEFT (CPT=73630)     LOCATION:  [facility]       TECHNIQUE:  AP, oblique, and lateral views were obtained. COMPARISON:  None. INDICATIONS:  left foot injury     PATIENT STATED HISTORY: (As transcribed by Technologist)  Patient twisted left foot today and has pain on the lateral aspect of foot. FINDINGS:    BONES:  Acute avulsion type fracture which is nondisplaced involving the base of the 5th metatarsal.  There is bony spurring along the calcaneal plantar fascia origin. Remainder of the bones are unremarkable. SOFT TISSUES:  Negative. No visible soft tissue swelling. EFFUSION:  None visible. OTHER:  Negative.                    Impression: CONCLUSION:  Acute nondisplaced fracture at the base of the left 5th metatarsal.        Dictated by (CST): Kate Rico MD on 7/09/2022 at 5:17 PM       Finalized by (CST): Kate Rico MD on 7/09/2022 at 5:18 PM        Future Appointments   Date Time Provider Jean Covarrubias   7/12/2022  1:15 PM Rc Colbert., DPM [facility] [id number]   8/5/2022 10:00 AM Katie Zimmerman MD [facility]

## 2022-07-11 NOTE — TELEPHONE ENCOUNTER
Patient has an appointment scheduled on 7/12 with Dr. Bella Vaughan for Closed nondisplaced fracture of fifth metatarsal bone of left foot. Patient had imaging taken on 7/9. Please advise if additional imaging is needed prior.

## 2022-07-12 ENCOUNTER — OFFICE VISIT (OUTPATIENT)
Dept: ORTHOPEDICS CLINIC | Facility: CLINIC | Age: 87
End: 2022-07-12
Payer: MEDICARE

## 2022-07-12 DIAGNOSIS — M85.871 OSTEOPENIA OF BOTH FEET: ICD-10-CM

## 2022-07-12 DIAGNOSIS — S92.354A CLOSED NONDISPLACED FRACTURE OF FIFTH METATARSAL BONE OF RIGHT FOOT, INITIAL ENCOUNTER: Primary | ICD-10-CM

## 2022-07-12 DIAGNOSIS — M85.872 OSTEOPENIA OF BOTH FEET: ICD-10-CM

## 2022-07-12 PROCEDURE — 99203 OFFICE O/P NEW LOW 30 MIN: CPT | Performed by: PODIATRIST

## 2022-08-16 ENCOUNTER — HOSPITAL ENCOUNTER (OUTPATIENT)
Dept: GENERAL RADIOLOGY | Age: 87
Discharge: HOME | End: 2022-08-16
Attending: PODIATRIST
Payer: MEDICARE

## 2022-08-16 ENCOUNTER — OFFICE VISIT (OUTPATIENT)
Dept: ORTHOPEDICS CLINIC | Facility: CLINIC | Age: 87
End: 2022-08-16
Payer: MEDICARE

## 2022-08-16 VITALS — HEIGHT: 61 IN | BODY MASS INDEX: 31.72 KG/M2 | WEIGHT: 168 LBS

## 2022-08-16 DIAGNOSIS — M85.879 OSTEOPENIA OF FOOT, UNSPECIFIED LATERALITY: ICD-10-CM

## 2022-08-16 DIAGNOSIS — M84.375D: Primary | ICD-10-CM

## 2022-08-16 DIAGNOSIS — S92.354A CLOSED NONDISPLACED FRACTURE OF FIFTH METATARSAL BONE OF RIGHT FOOT, INITIAL ENCOUNTER: ICD-10-CM

## 2022-08-16 PROCEDURE — 73630 X-RAY EXAM OF FOOT: CPT | Performed by: PODIATRIST

## 2022-08-16 PROCEDURE — 1126F AMNT PAIN NOTED NONE PRSNT: CPT | Performed by: PODIATRIST

## 2022-08-16 PROCEDURE — 99024 POSTOP FOLLOW-UP VISIT: CPT | Performed by: PODIATRIST

## 2022-08-16 NOTE — PROGRESS NOTES
[facility] Progress Note    Subjective:     Mrs. Adelia Awan returns for follow-up of 5th  metatarsal  fracture  left foot. Doing well. She is kept the boot on  Having no pain. Has been about 5 weeks now since the injury      Objective:     Exam no palpable tenderness about the fifth ray. No swelling present on left foot        Left foot x-rays show the fracture still to be present but callus is noted fifth metatarsal base          Assessment/Plan:     Diagnoses and all orders for this visit:    Metatarsal stress fracture with routine healing, left  -     XR FOOT, COMPLETE (MIN 3 VIEWS), LEFT (CPT=73630); Future    Osteopenia of foot, unspecified laterality        She is doing well and can get out of the boot at this point into her solid tennis shoe. Gradual increase in activity. She does not have to follow-up but if she starts to have any instability or continued pain, I would like to see her  We would then start PT          Efra Benoit. Penny Streeter, DPM  [facility]    [employer] speech recognition software was used to prepare this note. If a word or phrase is confusing, it is likely do to a failure of recognition. Please contact me with any questions or clarifications.

## 2022-12-01 ENCOUNTER — LAB ENCOUNTER (OUTPATIENT)
Dept: LAB | Age: 87
End: 2022-12-01
Attending: INTERNAL MEDICINE
Payer: MEDICARE

## 2022-12-01 DIAGNOSIS — I70.201 OCCLUSION OF RIGHT FEMORAL ARTERY (HCC): ICD-10-CM

## 2022-12-01 DIAGNOSIS — R53.83 FATIGUE: ICD-10-CM

## 2022-12-01 DIAGNOSIS — R60.0 LOCALIZED EDEMA: Primary | ICD-10-CM

## 2022-12-01 DIAGNOSIS — E78.00 PURE HYPERCHOLESTEROLEMIA: ICD-10-CM

## 2022-12-01 LAB
ALBUMIN SERPL-MCNC: 3.6 G/DL (ref 3.4–5)
ALBUMIN/GLOB SERPL: 1.2 {RATIO} (ref 1–2)
ALP LIVER SERPL-CCNC: 99 U/L
ALT SERPL-CCNC: 17 U/L
ANION GAP SERPL CALC-SCNC: 5 MMOL/L (ref 0–18)
AST SERPL-CCNC: 19 U/L (ref 15–37)
BILIRUB SERPL-MCNC: 0.8 MG/DL (ref 0.1–2)
BUN BLD-MCNC: 27 MG/DL (ref 7–18)
CALCIUM BLD-MCNC: 9.6 MG/DL (ref 8.5–10.1)
CHLORIDE SERPL-SCNC: 109 MMOL/L (ref 98–112)
CHOLEST SERPL-MCNC: 182 MG/DL (ref ?–200)
CO2 SERPL-SCNC: 31 MMOL/L (ref 21–32)
CREAT BLD-MCNC: 0.99 MG/DL
ERYTHROCYTE [DISTWIDTH] IN BLOOD BY AUTOMATED COUNT: 14.6 %
FASTING PATIENT LIPID ANSWER: YES
FASTING STATUS PATIENT QL REPORTED: YES
GFR SERPLBLD BASED ON 1.73 SQ M-ARVRAT: 56 ML/MIN/1.73M2 (ref 60–?)
GLOBULIN PLAS-MCNC: 2.9 G/DL (ref 2.8–4.4)
GLUCOSE BLD-MCNC: 90 MG/DL (ref 70–99)
HCT VFR BLD AUTO: 38.9 %
HDLC SERPL-MCNC: 72 MG/DL (ref 40–59)
HGB BLD-MCNC: 12 G/DL
LDLC SERPL CALC-MCNC: 89 MG/DL (ref ?–100)
MCH RBC QN AUTO: 28.5 PG (ref 26–34)
MCHC RBC AUTO-ENTMCNC: 30.8 G/DL (ref 31–37)
MCV RBC AUTO: 92.4 FL
NONHDLC SERPL-MCNC: 110 MG/DL (ref ?–130)
OSMOLALITY SERPL CALC.SUM OF ELEC: 305 MOSM/KG (ref 275–295)
PLATELET # BLD AUTO: 205 10(3)UL (ref 150–450)
POTASSIUM SERPL-SCNC: 3.4 MMOL/L (ref 3.5–5.1)
PROT SERPL-MCNC: 6.5 G/DL (ref 6.4–8.2)
RBC # BLD AUTO: 4.21 X10(6)UL
SODIUM SERPL-SCNC: 145 MMOL/L (ref 136–145)
TRIGL SERPL-MCNC: 123 MG/DL (ref 30–149)
VLDLC SERPL CALC-MCNC: 20 MG/DL (ref 0–30)
WBC # BLD AUTO: 9.6 X10(3) UL (ref 4–11)

## 2022-12-01 PROCEDURE — 36415 COLL VENOUS BLD VENIPUNCTURE: CPT

## 2022-12-01 PROCEDURE — 85027 COMPLETE CBC AUTOMATED: CPT

## 2022-12-01 PROCEDURE — 80053 COMPREHEN METABOLIC PANEL: CPT

## 2022-12-01 PROCEDURE — 80061 LIPID PANEL: CPT

## 2022-12-08 ENCOUNTER — OFFICE VISIT (OUTPATIENT)
Dept: FAMILY MEDICINE CLINIC | Facility: CLINIC | Age: 87
End: 2022-12-08
Payer: MEDICARE

## 2022-12-08 VITALS
SYSTOLIC BLOOD PRESSURE: 160 MMHG | HEART RATE: 55 BPM | OXYGEN SATURATION: 100 % | WEIGHT: 166.19 LBS | DIASTOLIC BLOOD PRESSURE: 64 MMHG | BODY MASS INDEX: 31.38 KG/M2 | RESPIRATION RATE: 16 BRPM | HEIGHT: 61 IN

## 2022-12-08 DIAGNOSIS — E78.00 PURE HYPERCHOLESTEROLEMIA: ICD-10-CM

## 2022-12-08 DIAGNOSIS — M17.11 PRIMARY OSTEOARTHRITIS OF RIGHT KNEE: ICD-10-CM

## 2022-12-08 DIAGNOSIS — I10 ESSENTIAL HYPERTENSION: Primary | ICD-10-CM

## 2022-12-08 DIAGNOSIS — I10 ESSENTIAL HYPERTENSION: ICD-10-CM

## 2022-12-08 DIAGNOSIS — R45.89 DEPRESSED MOOD: ICD-10-CM

## 2022-12-08 DIAGNOSIS — Z00.00 ENCOUNTER FOR ANNUAL HEALTH EXAMINATION: ICD-10-CM

## 2022-12-08 DIAGNOSIS — I35.0 NONRHEUMATIC AORTIC VALVE STENOSIS: ICD-10-CM

## 2022-12-08 RX ORDER — AMLODIPINE BESYLATE 2.5 MG/1
1 TABLET ORAL DAILY
COMMUNITY
Start: 2022-12-05 | End: 2022-12-08

## 2022-12-08 RX ORDER — AMLODIPINE BESYLATE 2.5 MG/1
2.5 TABLET ORAL DAILY
Qty: 30 TABLET | Refills: 1 | Status: SHIPPED | OUTPATIENT
Start: 2022-12-08

## 2022-12-08 RX ORDER — AMLODIPINE BESYLATE 2.5 MG/1
TABLET ORAL
Qty: 90 TABLET | Refills: 0 | OUTPATIENT
Start: 2022-12-08

## 2022-12-23 ENCOUNTER — VIRTUAL PHONE E/M (OUTPATIENT)
Dept: FAMILY MEDICINE CLINIC | Facility: CLINIC | Age: 87
End: 2022-12-23
Payer: MEDICARE

## 2022-12-23 DIAGNOSIS — U07.1 COVID: Primary | ICD-10-CM

## 2022-12-23 PROCEDURE — 99212 OFFICE O/P EST SF 10 MIN: CPT | Performed by: FAMILY MEDICINE

## 2022-12-23 RX ORDER — NIRMATRELVIR AND RITONAVIR 300-100 MG
KIT ORAL
Qty: 30 TABLET | Refills: 0 | Status: SHIPPED | OUTPATIENT
Start: 2022-12-23 | End: 2022-12-28

## 2022-12-23 RX ORDER — LIDOCAINE HYDROCHLORIDE 20 MG/ML
5 SOLUTION OROPHARYNGEAL
Qty: 100 ML | Refills: 0 | Status: SHIPPED | OUTPATIENT
Start: 2022-12-23 | End: 2023-01-02

## 2023-04-07 ENCOUNTER — OFFICE VISIT (OUTPATIENT)
Dept: FAMILY MEDICINE CLINIC | Facility: CLINIC | Age: 88
End: 2023-04-07
Payer: MEDICARE

## 2023-04-07 VITALS
WEIGHT: 170.81 LBS | OXYGEN SATURATION: 99 % | SYSTOLIC BLOOD PRESSURE: 148 MMHG | BODY MASS INDEX: 32.25 KG/M2 | RESPIRATION RATE: 16 BRPM | DIASTOLIC BLOOD PRESSURE: 68 MMHG | HEART RATE: 68 BPM | HEIGHT: 61 IN

## 2023-04-07 DIAGNOSIS — I10 ESSENTIAL HYPERTENSION: ICD-10-CM

## 2023-04-07 DIAGNOSIS — L60.1 ONYCHOLYSIS: Primary | ICD-10-CM

## 2023-04-07 PROCEDURE — 99214 OFFICE O/P EST MOD 30 MIN: CPT | Performed by: FAMILY MEDICINE

## 2023-04-07 RX ORDER — LATANOPROST 50 UG/ML
1 SOLUTION/ DROPS OPHTHALMIC NIGHTLY
COMMUNITY
Start: 2023-01-27

## 2023-04-07 RX ORDER — ATENOLOL 25 MG/1
25 TABLET ORAL DAILY
Qty: 30 TABLET | Refills: 0 | Status: SHIPPED | OUTPATIENT
Start: 2023-04-07 | End: 2023-04-07

## 2023-04-07 RX ORDER — ATENOLOL 25 MG/1
TABLET ORAL
Qty: 90 TABLET | Refills: 0 | Status: SHIPPED | OUTPATIENT
Start: 2023-04-07

## 2023-05-02 ENCOUNTER — HOSPITAL ENCOUNTER (OUTPATIENT)
Dept: CV DIAGNOSTICS | Age: 88
Discharge: HOME | End: 2023-05-02
Attending: INTERNAL MEDICINE
Payer: MEDICARE

## 2023-05-02 DIAGNOSIS — I10 HYPERTENSION, UNSPECIFIED TYPE: ICD-10-CM

## 2023-05-02 DIAGNOSIS — I35.0 NON-RHEUMATIC AORTIC STENOSIS: ICD-10-CM

## 2023-05-02 DIAGNOSIS — R53.83 FATIGUE: ICD-10-CM

## 2023-05-02 PROCEDURE — 93306 TTE W/DOPPLER COMPLETE: CPT | Performed by: INTERNAL MEDICINE

## 2023-05-22 DIAGNOSIS — E78.00 PURE HYPERCHOLESTEROLEMIA: ICD-10-CM

## 2023-05-22 DIAGNOSIS — I10 ESSENTIAL HYPERTENSION: ICD-10-CM

## 2023-05-22 RX ORDER — ATORVASTATIN CALCIUM 40 MG/1
TABLET, FILM COATED ORAL
Qty: 90 TABLET | Refills: 3 | Status: SHIPPED | OUTPATIENT
Start: 2023-05-22

## 2023-05-22 RX ORDER — HYDROCHLOROTHIAZIDE 25 MG/1
TABLET ORAL
Qty: 180 TABLET | Refills: 3 | Status: SHIPPED | OUTPATIENT
Start: 2023-05-22

## 2023-05-30 NOTE — PROCEDURES
PROCEDURE: CAMPBELL BIOPSY STEREOTACTIC W/CALC 1 SITE RIGHT    COMPARISON: CAMPBELL BLANCO DIAGNOSTIC ADDL VWS RIGHT (RLO=75545), 2/03/2017, 13:23.     INDICATIONS: R92.1 Mammographic calcification found on diagnostic imaging of breast    DESCRIPTION: Witnessed darien Melolabial Interpolation Flap Text: A decision was made to reconstruct the defect utilizing an interpolation axial flap and a staged reconstruction.  A telfa template was made of the defect.  This telfa template was then used to outline the melolabial interpolation flap.  The donor area for the pedicle flap was then injected with anesthesia.  The flap was excised through the skin and subcutaneous tissue down to the layer of the underlying musculature.  The pedicle flap was carefully excised within this deep plane to maintain its blood supply.  The edges of the donor site were undermined.   The donor site was closed in a primary fashion.  The pedicle was then rotated into position and sutured.  Once the tube was sutured into place, adequate blood supply was confirmed with blanching and refill.  The pedicle was then wrapped with xeroform gauze and dressed appropriately with a telfa and gauze bandage to ensure continued blood supply and protect the attached pedicle.

## 2023-06-02 ENCOUNTER — HOSPITAL ENCOUNTER (EMERGENCY)
Facility: HOSPITAL | Age: 88
Discharge: HOME | End: 2023-06-02
Attending: EMERGENCY MEDICINE
Payer: MEDICARE

## 2023-06-02 ENCOUNTER — LAB ENCOUNTER (OUTPATIENT)
Dept: LAB | Age: 88
End: 2023-06-02
Attending: INTERNAL MEDICINE
Payer: MEDICARE

## 2023-06-02 ENCOUNTER — OFFICE VISIT (OUTPATIENT)
Dept: FAMILY MEDICINE CLINIC | Facility: CLINIC | Age: 88
End: 2023-06-02
Payer: MEDICARE

## 2023-06-02 VITALS
TEMPERATURE: 97 F | BODY MASS INDEX: 31.53 KG/M2 | OXYGEN SATURATION: 100 % | HEART RATE: 62 BPM | RESPIRATION RATE: 14 BRPM | HEIGHT: 61 IN | WEIGHT: 167 LBS | SYSTOLIC BLOOD PRESSURE: 138 MMHG | DIASTOLIC BLOOD PRESSURE: 62 MMHG

## 2023-06-02 VITALS
BODY MASS INDEX: 31.53 KG/M2 | DIASTOLIC BLOOD PRESSURE: 43 MMHG | HEIGHT: 61 IN | OXYGEN SATURATION: 100 % | SYSTOLIC BLOOD PRESSURE: 149 MMHG | TEMPERATURE: 99 F | HEART RATE: 70 BPM | RESPIRATION RATE: 18 BRPM | WEIGHT: 167 LBS

## 2023-06-02 DIAGNOSIS — E87.6 HYPOKALEMIA: Primary | ICD-10-CM

## 2023-06-02 DIAGNOSIS — R39.9 UTI SYMPTOMS: Primary | ICD-10-CM

## 2023-06-02 DIAGNOSIS — I10 ESSENTIAL HYPERTENSION, MALIGNANT: Primary | ICD-10-CM

## 2023-06-02 DIAGNOSIS — E78.00 PURE HYPERCHOLESTEROLEMIA: ICD-10-CM

## 2023-06-02 LAB
ALBUMIN SERPL-MCNC: 3.4 G/DL (ref 3.4–5)
ALBUMIN SERPL-MCNC: 3.5 G/DL (ref 3.4–5)
ALBUMIN/GLOB SERPL: 0.9 {RATIO} (ref 1–2)
ALBUMIN/GLOB SERPL: 1 {RATIO} (ref 1–2)
ALP LIVER SERPL-CCNC: 106 U/L
ALP LIVER SERPL-CCNC: 110 U/L
ALT SERPL-CCNC: 13 U/L
ALT SERPL-CCNC: 16 U/L
ANION GAP SERPL CALC-SCNC: 2 MMOL/L (ref 0–18)
ANION GAP SERPL CALC-SCNC: 8 MMOL/L (ref 0–18)
AST SERPL-CCNC: 19 U/L (ref 15–37)
AST SERPL-CCNC: 21 U/L (ref 15–37)
BASOPHILS # BLD AUTO: 0.03 X10(3) UL (ref 0–0.2)
BASOPHILS NFR BLD AUTO: 0.3 %
BILIRUB SERPL-MCNC: 0.5 MG/DL (ref 0.1–2)
BILIRUB SERPL-MCNC: 1 MG/DL (ref 0.1–2)
BUN BLD-MCNC: 32 MG/DL (ref 7–18)
BUN BLD-MCNC: 34 MG/DL (ref 7–18)
CALCIUM BLD-MCNC: 9.1 MG/DL (ref 8.5–10.1)
CALCIUM BLD-MCNC: 9.5 MG/DL (ref 8.5–10.1)
CHLORIDE SERPL-SCNC: 103 MMOL/L (ref 98–112)
CHLORIDE SERPL-SCNC: 107 MMOL/L (ref 98–112)
CHOLEST SERPL-MCNC: 181 MG/DL (ref ?–200)
CO2 SERPL-SCNC: 30 MMOL/L (ref 21–32)
CO2 SERPL-SCNC: 31 MMOL/L (ref 21–32)
CREAT BLD-MCNC: 1.01 MG/DL
CREAT BLD-MCNC: 1.1 MG/DL
EOSINOPHIL # BLD AUTO: 0.08 X10(3) UL (ref 0–0.7)
EOSINOPHIL NFR BLD AUTO: 0.8 %
ERYTHROCYTE [DISTWIDTH] IN BLOOD BY AUTOMATED COUNT: 13.4 %
FASTING PATIENT LIPID ANSWER: YES
FASTING STATUS PATIENT QL REPORTED: YES
GFR SERPLBLD BASED ON 1.73 SQ M-ARVRAT: 49 ML/MIN/1.73M2 (ref 60–?)
GFR SERPLBLD BASED ON 1.73 SQ M-ARVRAT: 54 ML/MIN/1.73M2 (ref 60–?)
GLOBULIN PLAS-MCNC: 3.3 G/DL (ref 2.8–4.4)
GLOBULIN PLAS-MCNC: 3.7 G/DL (ref 2.8–4.4)
GLUCOSE (URINE DIPSTICK): NEGATIVE MG/DL
GLUCOSE BLD-MCNC: 107 MG/DL (ref 70–99)
GLUCOSE BLD-MCNC: 110 MG/DL (ref 70–99)
HCT VFR BLD AUTO: 32.7 %
HDLC SERPL-MCNC: 58 MG/DL (ref 40–59)
HGB BLD-MCNC: 10.5 G/DL
IMM GRANULOCYTES # BLD AUTO: 0.05 X10(3) UL (ref 0–1)
IMM GRANULOCYTES NFR BLD: 0.5 %
KETONES (URINE DIPSTICK): NEGATIVE MG/DL
LDLC SERPL CALC-MCNC: 98 MG/DL (ref ?–100)
LYMPHOCYTES # BLD AUTO: 2.3 X10(3) UL (ref 1–4)
LYMPHOCYTES NFR BLD AUTO: 22.7 %
MCH RBC QN AUTO: 27.9 PG (ref 26–34)
MCHC RBC AUTO-ENTMCNC: 32.1 G/DL (ref 31–37)
MCV RBC AUTO: 87 FL
MONOCYTES # BLD AUTO: 0.94 X10(3) UL (ref 0.1–1)
MONOCYTES NFR BLD AUTO: 9.3 %
MULTISTIX LOT#: ABNORMAL NUMERIC
NEUTROPHILS # BLD AUTO: 6.74 X10 (3) UL (ref 1.5–7.7)
NEUTROPHILS # BLD AUTO: 6.74 X10(3) UL (ref 1.5–7.7)
NEUTROPHILS NFR BLD AUTO: 66.4 %
NITRITE, URINE: POSITIVE
NONHDLC SERPL-MCNC: 123 MG/DL (ref ?–130)
OSMOLALITY SERPL CALC.SUM OF ELEC: 298 MOSM/KG (ref 275–295)
OSMOLALITY SERPL CALC.SUM OF ELEC: 299 MOSM/KG (ref 275–295)
PH, URINE: 5.5 (ref 4.5–8)
PLATELET # BLD AUTO: 199 10(3)UL (ref 150–450)
POTASSIUM SERPL-SCNC: 2.8 MMOL/L (ref 3.5–5.1)
POTASSIUM SERPL-SCNC: 2.8 MMOL/L (ref 3.5–5.1)
PROT SERPL-MCNC: 6.7 G/DL (ref 6.4–8.2)
PROT SERPL-MCNC: 7.2 G/DL (ref 6.4–8.2)
PROTEIN (URINE DIPSTICK): 30 MG/DL
RBC # BLD AUTO: 3.76 X10(6)UL
SODIUM SERPL-SCNC: 140 MMOL/L (ref 136–145)
SODIUM SERPL-SCNC: 141 MMOL/L (ref 136–145)
SPECIFIC GRAVITY: >=1.03 (ref 1–1.03)
TRIGL SERPL-MCNC: 141 MG/DL (ref 30–149)
UROBILINOGEN,SEMI-QN: 0.2 MG/DL (ref 0–1.9)
VLDLC SERPL CALC-MCNC: 23 MG/DL (ref 0–30)
WBC # BLD AUTO: 10.1 X10(3) UL (ref 4–11)

## 2023-06-02 PROCEDURE — 87086 URINE CULTURE/COLONY COUNT: CPT | Performed by: NURSE PRACTITIONER

## 2023-06-02 PROCEDURE — 87186 SC STD MICRODIL/AGAR DIL: CPT | Performed by: NURSE PRACTITIONER

## 2023-06-02 PROCEDURE — 80061 LIPID PANEL: CPT

## 2023-06-02 PROCEDURE — 81003 URINALYSIS AUTO W/O SCOPE: CPT | Performed by: NURSE PRACTITIONER

## 2023-06-02 PROCEDURE — 80053 COMPREHEN METABOLIC PANEL: CPT | Performed by: EMERGENCY MEDICINE

## 2023-06-02 PROCEDURE — 93010 ELECTROCARDIOGRAM REPORT: CPT

## 2023-06-02 PROCEDURE — 99213 OFFICE O/P EST LOW 20 MIN: CPT | Performed by: NURSE PRACTITIONER

## 2023-06-02 PROCEDURE — 85025 COMPLETE CBC W/AUTO DIFF WBC: CPT | Performed by: EMERGENCY MEDICINE

## 2023-06-02 PROCEDURE — 36415 COLL VENOUS BLD VENIPUNCTURE: CPT

## 2023-06-02 PROCEDURE — 87088 URINE BACTERIA CULTURE: CPT | Performed by: NURSE PRACTITIONER

## 2023-06-02 PROCEDURE — 99284 EMERGENCY DEPT VISIT MOD MDM: CPT

## 2023-06-02 PROCEDURE — 80053 COMPREHEN METABOLIC PANEL: CPT

## 2023-06-02 PROCEDURE — 93005 ELECTROCARDIOGRAM TRACING: CPT

## 2023-06-02 RX ORDER — POTASSIUM CHLORIDE 20 MEQ/1
40 TABLET, EXTENDED RELEASE ORAL ONCE
Status: COMPLETED | OUTPATIENT
Start: 2023-06-02 | End: 2023-06-02

## 2023-06-02 RX ORDER — SULFAMETHOXAZOLE AND TRIMETHOPRIM 800; 160 MG/1; MG/1
1 TABLET ORAL ONCE
Status: COMPLETED | OUTPATIENT
Start: 2023-06-02 | End: 2023-06-02

## 2023-06-02 RX ORDER — SULFAMETHOXAZOLE AND TRIMETHOPRIM 800; 160 MG/1; MG/1
1 TABLET ORAL 2 TIMES DAILY
Qty: 14 TABLET | Refills: 0 | Status: SHIPPED | OUTPATIENT
Start: 2023-06-02 | End: 2023-06-09

## 2023-06-02 RX ORDER — SULFAMETHOXAZOLE AND TRIMETHOPRIM 800; 160 MG/1; MG/1
1 TABLET ORAL 2 TIMES DAILY
Qty: 14 TABLET | Refills: 0 | Status: SHIPPED | OUTPATIENT
Start: 2023-06-02 | End: 2023-06-09 | Stop reason: ALTCHOICE

## 2023-06-02 RX ORDER — POTASSIUM CHLORIDE 20 MEQ/1
20 TABLET, EXTENDED RELEASE ORAL 2 TIMES DAILY
Qty: 6 TABLET | Refills: 0 | Status: SHIPPED | OUTPATIENT
Start: 2023-06-02 | End: 2023-06-05

## 2023-06-03 LAB
ATRIAL RATE: 72 BPM
P AXIS: 66 DEGREES
P-R INTERVAL: 148 MS
Q-T INTERVAL: 442 MS
QRS DURATION: 98 MS
QTC CALCULATION (BEZET): 483 MS
R AXIS: -36 DEGREES
T AXIS: 62 DEGREES
VENTRICULAR RATE: 72 BPM

## 2023-06-03 NOTE — ED INITIAL ASSESSMENT (HPI)
Pt to ED for K-level of 2.8 drawn today. Pt states blood test was a pre-req since she has an appointment to see her Cardiologist - Dr. Bert Chong on Tue 6/6 for a Heart Murmur. Pt denies any chest pain, nor shortness of breath, nor any other symptoms.

## 2023-06-09 ENCOUNTER — LAB ENCOUNTER (OUTPATIENT)
Dept: LAB | Age: 88
End: 2023-06-09
Attending: FAMILY MEDICINE
Payer: MEDICARE

## 2023-06-09 ENCOUNTER — OFFICE VISIT (OUTPATIENT)
Dept: FAMILY MEDICINE CLINIC | Facility: CLINIC | Age: 88
End: 2023-06-09
Payer: MEDICARE

## 2023-06-09 VITALS
RESPIRATION RATE: 16 BRPM | OXYGEN SATURATION: 98 % | HEART RATE: 64 BPM | DIASTOLIC BLOOD PRESSURE: 82 MMHG | TEMPERATURE: 98 F | WEIGHT: 172 LBS | SYSTOLIC BLOOD PRESSURE: 122 MMHG | BODY MASS INDEX: 32.47 KG/M2 | HEIGHT: 61 IN

## 2023-06-09 DIAGNOSIS — E87.6 HYPOKALEMIA: ICD-10-CM

## 2023-06-09 DIAGNOSIS — E87.6 HYPOKALEMIA: Primary | ICD-10-CM

## 2023-06-09 LAB
ANION GAP SERPL CALC-SCNC: 7 MMOL/L (ref 0–18)
BUN BLD-MCNC: 27 MG/DL (ref 7–18)
CALCIUM BLD-MCNC: 9.1 MG/DL (ref 8.5–10.1)
CHLORIDE SERPL-SCNC: 110 MMOL/L (ref 98–112)
CO2 SERPL-SCNC: 23 MMOL/L (ref 21–32)
CREAT BLD-MCNC: 1.31 MG/DL
FASTING STATUS PATIENT QL REPORTED: NO
GFR SERPLBLD BASED ON 1.73 SQ M-ARVRAT: 39 ML/MIN/1.73M2 (ref 60–?)
GLUCOSE BLD-MCNC: 83 MG/DL (ref 70–99)
OSMOLALITY SERPL CALC.SUM OF ELEC: 294 MOSM/KG (ref 275–295)
POTASSIUM SERPL-SCNC: 3.9 MMOL/L (ref 3.5–5.1)
SODIUM SERPL-SCNC: 140 MMOL/L (ref 136–145)

## 2023-06-09 PROCEDURE — 36415 COLL VENOUS BLD VENIPUNCTURE: CPT

## 2023-06-09 PROCEDURE — 80048 BASIC METABOLIC PNL TOTAL CA: CPT

## 2023-06-09 PROCEDURE — 99213 OFFICE O/P EST LOW 20 MIN: CPT | Performed by: FAMILY MEDICINE

## 2023-06-10 DIAGNOSIS — E87.6 HYPOKALEMIA: Primary | ICD-10-CM

## 2023-06-16 ENCOUNTER — LAB ENCOUNTER (OUTPATIENT)
Dept: LAB | Age: 88
End: 2023-06-16
Attending: FAMILY MEDICINE
Payer: MEDICARE

## 2023-06-16 DIAGNOSIS — E87.6 HYPOKALEMIA: ICD-10-CM

## 2023-06-16 LAB
ANION GAP SERPL CALC-SCNC: 7 MMOL/L (ref 0–18)
BUN BLD-MCNC: 24 MG/DL (ref 7–18)
CALCIUM BLD-MCNC: 9.5 MG/DL (ref 8.5–10.1)
CHLORIDE SERPL-SCNC: 107 MMOL/L (ref 98–112)
CO2 SERPL-SCNC: 27 MMOL/L (ref 21–32)
CREAT BLD-MCNC: 1.1 MG/DL
FASTING STATUS PATIENT QL REPORTED: NO
GFR SERPLBLD BASED ON 1.73 SQ M-ARVRAT: 49 ML/MIN/1.73M2 (ref 60–?)
GLUCOSE BLD-MCNC: 90 MG/DL (ref 70–99)
OSMOLALITY SERPL CALC.SUM OF ELEC: 296 MOSM/KG (ref 275–295)
POTASSIUM SERPL-SCNC: 3.8 MMOL/L (ref 3.5–5.1)
SODIUM SERPL-SCNC: 141 MMOL/L (ref 136–145)

## 2023-06-16 PROCEDURE — 80048 BASIC METABOLIC PNL TOTAL CA: CPT

## 2023-06-16 PROCEDURE — 36415 COLL VENOUS BLD VENIPUNCTURE: CPT

## 2023-12-06 ENCOUNTER — LAB ENCOUNTER (OUTPATIENT)
Dept: LAB | Age: 88
End: 2023-12-06
Attending: FAMILY MEDICINE
Payer: MEDICARE

## 2023-12-06 ENCOUNTER — OFFICE VISIT (OUTPATIENT)
Dept: FAMILY MEDICINE CLINIC | Facility: CLINIC | Age: 88
End: 2023-12-06
Payer: MEDICARE

## 2023-12-06 VITALS
OXYGEN SATURATION: 100 % | BODY MASS INDEX: 32.63 KG/M2 | DIASTOLIC BLOOD PRESSURE: 64 MMHG | WEIGHT: 172.81 LBS | HEART RATE: 66 BPM | HEIGHT: 61 IN | SYSTOLIC BLOOD PRESSURE: 172 MMHG | RESPIRATION RATE: 16 BRPM

## 2023-12-06 DIAGNOSIS — E78.00 PURE HYPERCHOLESTEROLEMIA: ICD-10-CM

## 2023-12-06 DIAGNOSIS — R60.0 BILATERAL LEG EDEMA: ICD-10-CM

## 2023-12-06 DIAGNOSIS — E87.6 HYPOKALEMIA: ICD-10-CM

## 2023-12-06 DIAGNOSIS — I10 ESSENTIAL HYPERTENSION: ICD-10-CM

## 2023-12-06 DIAGNOSIS — M17.11 PRIMARY OSTEOARTHRITIS OF RIGHT KNEE: ICD-10-CM

## 2023-12-06 DIAGNOSIS — R35.0 URINARY FREQUENCY: ICD-10-CM

## 2023-12-06 DIAGNOSIS — Z00.00 ENCOUNTER FOR ANNUAL HEALTH EXAMINATION: Primary | ICD-10-CM

## 2023-12-06 DIAGNOSIS — I83.893 VARICOSE VEINS OF BOTH LEGS WITH EDEMA: ICD-10-CM

## 2023-12-06 DIAGNOSIS — I35.0 NONRHEUMATIC AORTIC VALVE STENOSIS: ICD-10-CM

## 2023-12-06 LAB
ANION GAP SERPL CALC-SCNC: 10 MMOL/L (ref 0–18)
APPEARANCE: CLEAR
BILIRUBIN: NEGATIVE
BUN BLD-MCNC: 26 MG/DL (ref 9–23)
CALCIUM BLD-MCNC: 9.6 MG/DL (ref 8.5–10.1)
CHLORIDE SERPL-SCNC: 109 MMOL/L (ref 98–112)
CO2 SERPL-SCNC: 26 MMOL/L (ref 21–32)
CREAT BLD-MCNC: 1.14 MG/DL
EGFRCR SERPLBLD CKD-EPI 2021: 47 ML/MIN/1.73M2 (ref 60–?)
FASTING STATUS PATIENT QL REPORTED: NO
GLUCOSE (URINE DIPSTICK): NEGATIVE MG/DL
GLUCOSE BLD-MCNC: 92 MG/DL (ref 70–99)
KETONES (URINE DIPSTICK): NEGATIVE MG/DL
MULTISTIX EXPIRATION DATE: ABNORMAL DATE
MULTISTIX LOT#: ABNORMAL NUMERIC
NITRITE, URINE: NEGATIVE
OSMOLALITY SERPL CALC.SUM OF ELEC: 304 MOSM/KG (ref 275–295)
PH, URINE: 5 (ref 4.5–8)
POTASSIUM SERPL-SCNC: 3.4 MMOL/L (ref 3.5–5.1)
PROTEIN (URINE DIPSTICK): NEGATIVE MG/DL
SODIUM SERPL-SCNC: 145 MMOL/L (ref 136–145)
SPECIFIC GRAVITY: 1.02 (ref 1–1.03)
URINE-COLOR: YELLOW
UROBILINOGEN,SEMI-QN: 0.2 MG/DL (ref 0–1.9)

## 2023-12-06 PROCEDURE — 87086 URINE CULTURE/COLONY COUNT: CPT | Performed by: FAMILY MEDICINE

## 2023-12-06 PROCEDURE — G0439 PPPS, SUBSEQ VISIT: HCPCS | Performed by: FAMILY MEDICINE

## 2023-12-06 PROCEDURE — 87077 CULTURE AEROBIC IDENTIFY: CPT | Performed by: FAMILY MEDICINE

## 2023-12-06 PROCEDURE — 80048 BASIC METABOLIC PNL TOTAL CA: CPT

## 2023-12-06 PROCEDURE — 81003 URINALYSIS AUTO W/O SCOPE: CPT | Performed by: FAMILY MEDICINE

## 2023-12-06 PROCEDURE — 87186 SC STD MICRODIL/AGAR DIL: CPT | Performed by: FAMILY MEDICINE

## 2023-12-06 PROCEDURE — 36415 COLL VENOUS BLD VENIPUNCTURE: CPT

## 2023-12-06 PROCEDURE — 99213 OFFICE O/P EST LOW 20 MIN: CPT | Performed by: FAMILY MEDICINE

## 2023-12-06 PROCEDURE — 1125F AMNT PAIN NOTED PAIN PRSNT: CPT | Performed by: FAMILY MEDICINE

## 2023-12-06 RX ORDER — HYDROCHLOROTHIAZIDE 25 MG/1
50 TABLET ORAL DAILY
COMMUNITY
Start: 2023-12-06 | End: 2023-12-06

## 2023-12-06 RX ORDER — HYDROCHLOROTHIAZIDE 25 MG/1
50 TABLET ORAL DAILY
COMMUNITY
Start: 2023-12-06

## 2023-12-07 DIAGNOSIS — E87.6 HYPOKALEMIA DUE TO EXCESSIVE RENAL LOSS OF POTASSIUM: Primary | ICD-10-CM

## 2023-12-07 RX ORDER — POTASSIUM CHLORIDE 20 MEQ/1
20 TABLET, EXTENDED RELEASE ORAL DAILY
Qty: 30 TABLET | Refills: 3 | Status: SHIPPED | OUTPATIENT
Start: 2023-12-07

## 2023-12-08 ENCOUNTER — HOSPITAL ENCOUNTER (OUTPATIENT)
Dept: CV DIAGNOSTICS | Facility: HOSPITAL | Age: 88
Discharge: HOME | End: 2023-12-08
Attending: FAMILY MEDICINE
Payer: MEDICARE

## 2023-12-08 DIAGNOSIS — R60.0 BILATERAL LEG EDEMA: ICD-10-CM

## 2023-12-08 DIAGNOSIS — I35.0 NONRHEUMATIC AORTIC VALVE STENOSIS: ICD-10-CM

## 2023-12-08 PROCEDURE — 93306 TTE W/DOPPLER COMPLETE: CPT | Performed by: FAMILY MEDICINE

## 2023-12-08 RX ORDER — SULFAMETHOXAZOLE AND TRIMETHOPRIM 800; 160 MG/1; MG/1
1 TABLET ORAL 2 TIMES DAILY
Qty: 10 TABLET | Refills: 0 | Status: SHIPPED | OUTPATIENT
Start: 2023-12-08 | End: 2023-12-13

## 2023-12-14 ENCOUNTER — LAB ENCOUNTER (OUTPATIENT)
Dept: LAB | Age: 88
End: 2023-12-14
Attending: FAMILY MEDICINE
Payer: MEDICARE

## 2023-12-14 DIAGNOSIS — E87.6 HYPOKALEMIA: ICD-10-CM

## 2023-12-14 DIAGNOSIS — I10 ESSENTIAL HYPERTENSION: Primary | ICD-10-CM

## 2023-12-14 DIAGNOSIS — R60.0 BILATERAL LEG EDEMA: ICD-10-CM

## 2023-12-14 DIAGNOSIS — I10 ESSENTIAL HYPERTENSION: ICD-10-CM

## 2023-12-14 DIAGNOSIS — E87.6 HYPOKALEMIA DUE TO EXCESSIVE RENAL LOSS OF POTASSIUM: ICD-10-CM

## 2023-12-14 LAB
ANION GAP SERPL CALC-SCNC: 7 MMOL/L (ref 0–18)
BUN BLD-MCNC: 29 MG/DL (ref 9–23)
CALCIUM BLD-MCNC: 9.4 MG/DL (ref 8.5–10.1)
CHLORIDE SERPL-SCNC: 110 MMOL/L (ref 98–112)
CO2 SERPL-SCNC: 25 MMOL/L (ref 21–32)
CREAT BLD-MCNC: 1.61 MG/DL
EGFRCR SERPLBLD CKD-EPI 2021: 31 ML/MIN/1.73M2 (ref 60–?)
FASTING STATUS PATIENT QL REPORTED: NO
GLUCOSE BLD-MCNC: 95 MG/DL (ref 70–99)
OSMOLALITY SERPL CALC.SUM OF ELEC: 300 MOSM/KG (ref 275–295)
POTASSIUM SERPL-SCNC: 4 MMOL/L (ref 3.5–5.1)
SODIUM SERPL-SCNC: 142 MMOL/L (ref 136–145)

## 2023-12-14 PROCEDURE — 80048 BASIC METABOLIC PNL TOTAL CA: CPT

## 2023-12-14 PROCEDURE — 36415 COLL VENOUS BLD VENIPUNCTURE: CPT

## 2024-01-06 ENCOUNTER — LAB ENCOUNTER (OUTPATIENT)
Dept: LAB | Age: 89
End: 2024-01-06
Attending: INTERNAL MEDICINE
Payer: MEDICARE

## 2024-01-06 DIAGNOSIS — I10 ESSENTIAL HYPERTENSION: ICD-10-CM

## 2024-01-06 DIAGNOSIS — E87.6 HYPOKALEMIA DUE TO EXCESSIVE RENAL LOSS OF POTASSIUM: ICD-10-CM

## 2024-01-06 LAB
ANION GAP SERPL CALC-SCNC: 4 MMOL/L (ref 0–18)
BUN BLD-MCNC: 28 MG/DL (ref 9–23)
CALCIUM BLD-MCNC: 10 MG/DL (ref 8.5–10.1)
CHLORIDE SERPL-SCNC: 111 MMOL/L (ref 98–112)
CO2 SERPL-SCNC: 29 MMOL/L (ref 21–32)
CREAT BLD-MCNC: 1.02 MG/DL
EGFRCR SERPLBLD CKD-EPI 2021: 53 ML/MIN/1.73M2 (ref 60–?)
FASTING STATUS PATIENT QL REPORTED: NO
GLUCOSE BLD-MCNC: 102 MG/DL (ref 70–99)
OSMOLALITY SERPL CALC.SUM OF ELEC: 304 MOSM/KG (ref 275–295)
POTASSIUM SERPL-SCNC: 4 MMOL/L (ref 3.5–5.1)
SODIUM SERPL-SCNC: 144 MMOL/L (ref 136–145)

## 2024-01-06 PROCEDURE — 80048 BASIC METABOLIC PNL TOTAL CA: CPT

## 2024-01-06 PROCEDURE — 36415 COLL VENOUS BLD VENIPUNCTURE: CPT

## 2024-01-17 ENCOUNTER — HOSPITAL ENCOUNTER (OUTPATIENT)
Dept: ULTRASOUND IMAGING | Facility: HOSPITAL | Age: 89
Discharge: HOME | End: 2024-01-17
Attending: INTERNAL MEDICINE
Payer: MEDICARE

## 2024-01-17 DIAGNOSIS — I65.23 ASYMPTOMATIC BILATERAL CAROTID ARTERY STENOSIS: ICD-10-CM

## 2024-01-17 PROCEDURE — 93880 EXTRACRANIAL BILAT STUDY: CPT | Performed by: INTERNAL MEDICINE

## 2024-01-25 ENCOUNTER — OFFICE VISIT (OUTPATIENT)
Dept: FAMILY MEDICINE CLINIC | Facility: CLINIC | Age: 89
End: 2024-01-25
Payer: MEDICARE

## 2024-01-25 ENCOUNTER — LAB ENCOUNTER (OUTPATIENT)
Dept: LAB | Age: 89
End: 2024-01-25
Attending: FAMILY MEDICINE
Payer: MEDICARE

## 2024-01-25 VITALS
WEIGHT: 170 LBS | DIASTOLIC BLOOD PRESSURE: 64 MMHG | OXYGEN SATURATION: 98 % | BODY MASS INDEX: 32.1 KG/M2 | HEART RATE: 77 BPM | SYSTOLIC BLOOD PRESSURE: 136 MMHG | HEIGHT: 61 IN | RESPIRATION RATE: 16 BRPM

## 2024-01-25 DIAGNOSIS — R73.01 IMPAIRED FASTING GLUCOSE: ICD-10-CM

## 2024-01-25 DIAGNOSIS — E78.00 PURE HYPERCHOLESTEROLEMIA: ICD-10-CM

## 2024-01-25 DIAGNOSIS — E87.6 HYPOKALEMIA DUE TO EXCESSIVE RENAL LOSS OF POTASSIUM: ICD-10-CM

## 2024-01-25 DIAGNOSIS — I10 ESSENTIAL HYPERTENSION: ICD-10-CM

## 2024-01-25 DIAGNOSIS — I65.23 BILATERAL CAROTID ARTERY STENOSIS: ICD-10-CM

## 2024-01-25 DIAGNOSIS — I11.0 HYPERTENSIVE HEART DISEASE WITH HEART FAILURE (HCC): ICD-10-CM

## 2024-01-25 DIAGNOSIS — I35.0 NONRHEUMATIC AORTIC VALVE STENOSIS: ICD-10-CM

## 2024-01-25 DIAGNOSIS — I35.0 NONRHEUMATIC AORTIC VALVE STENOSIS: Primary | ICD-10-CM

## 2024-01-25 LAB
ALBUMIN SERPL-MCNC: 3.8 G/DL (ref 3.4–5)
ALBUMIN/GLOB SERPL: 1.2 {RATIO} (ref 1–2)
ALP LIVER SERPL-CCNC: 101 U/L
ANION GAP SERPL CALC-SCNC: 5 MMOL/L (ref 0–18)
AST SERPL-CCNC: 20 U/L (ref 15–37)
BASOPHILS # BLD AUTO: 0.04 X10(3) UL (ref 0–0.2)
BASOPHILS NFR BLD AUTO: 0.4 %
BILIRUB SERPL-MCNC: 0.8 MG/DL (ref 0.1–2)
BUN BLD-MCNC: 33 MG/DL (ref 9–23)
CALCIUM BLD-MCNC: 9.5 MG/DL (ref 8.5–10.1)
CHLORIDE SERPL-SCNC: 106 MMOL/L (ref 98–112)
CHOLEST SERPL-MCNC: 161 MG/DL (ref ?–200)
CO2 SERPL-SCNC: 28 MMOL/L (ref 21–32)
CREAT BLD-MCNC: 1.2 MG/DL
EGFRCR SERPLBLD CKD-EPI 2021: 44 ML/MIN/1.73M2 (ref 60–?)
EOSINOPHIL # BLD AUTO: 0.04 X10(3) UL (ref 0–0.7)
EOSINOPHIL NFR BLD AUTO: 0.4 %
ERYTHROCYTE [DISTWIDTH] IN BLOOD BY AUTOMATED COUNT: 13 %
EST. AVERAGE GLUCOSE BLD GHB EST-MCNC: 134 MG/DL (ref 68–126)
FASTING PATIENT LIPID ANSWER: YES
FASTING STATUS PATIENT QL REPORTED: YES
GLOBULIN PLAS-MCNC: 3.3 G/DL (ref 2.8–4.4)
GLUCOSE BLD-MCNC: 95 MG/DL (ref 70–99)
HBA1C MFR BLD: 6.3 % (ref ?–5.7)
HCT VFR BLD AUTO: 35.7 %
HDLC SERPL-MCNC: 62 MG/DL (ref 40–59)
HGB BLD-MCNC: 11.4 G/DL
IMM GRANULOCYTES # BLD AUTO: 0.06 X10(3) UL (ref 0–1)
IMM GRANULOCYTES NFR BLD: 0.7 %
LDLC SERPL CALC-MCNC: 79 MG/DL (ref ?–100)
LYMPHOCYTES # BLD AUTO: 1.93 X10(3) UL (ref 1–4)
LYMPHOCYTES NFR BLD AUTO: 20.9 %
MCH RBC QN AUTO: 28.6 PG (ref 26–34)
MCHC RBC AUTO-ENTMCNC: 31.9 G/DL (ref 31–37)
MCV RBC AUTO: 89.5 FL
MONOCYTES # BLD AUTO: 0.97 X10(3) UL (ref 0.1–1)
MONOCYTES NFR BLD AUTO: 10.5 %
NEUTROPHILS # BLD AUTO: 6.18 X10 (3) UL (ref 1.5–7.7)
NEUTROPHILS # BLD AUTO: 6.18 X10(3) UL (ref 1.5–7.7)
NEUTROPHILS NFR BLD AUTO: 67.1 %
NONHDLC SERPL-MCNC: 99 MG/DL (ref ?–130)
NT-PROBNP SERPL-MCNC: 435 PG/ML (ref ?–450)
OSMOLALITY SERPL CALC.SUM OF ELEC: 295 MOSM/KG (ref 275–295)
PLATELET # BLD AUTO: 200 10(3)UL (ref 150–450)
POTASSIUM SERPL-SCNC: 3.5 MMOL/L (ref 3.5–5.1)
PROT SERPL-MCNC: 7.1 G/DL (ref 6.4–8.2)
RBC # BLD AUTO: 3.99 X10(6)UL
SODIUM SERPL-SCNC: 139 MMOL/L (ref 136–145)
TRIGL SERPL-MCNC: 114 MG/DL (ref 30–149)
VLDLC SERPL CALC-MCNC: 18 MG/DL (ref 0–30)
WBC # BLD AUTO: 9.2 X10(3) UL (ref 4–11)

## 2024-01-25 PROCEDURE — 99214 OFFICE O/P EST MOD 30 MIN: CPT | Performed by: FAMILY MEDICINE

## 2024-01-25 PROCEDURE — 80061 LIPID PANEL: CPT

## 2024-01-25 PROCEDURE — 85025 COMPLETE CBC W/AUTO DIFF WBC: CPT

## 2024-01-25 PROCEDURE — 36415 COLL VENOUS BLD VENIPUNCTURE: CPT

## 2024-01-25 PROCEDURE — 80053 COMPREHEN METABOLIC PANEL: CPT

## 2024-01-25 PROCEDURE — 83880 ASSAY OF NATRIURETIC PEPTIDE: CPT

## 2024-01-25 PROCEDURE — 83036 HEMOGLOBIN GLYCOSYLATED A1C: CPT

## 2024-01-25 RX ORDER — FUROSEMIDE 20 MG/1
TABLET ORAL
COMMUNITY
Start: 2024-01-09

## 2024-01-25 NOTE — PROGRESS NOTES
Following up on lab results from January 6 which showed her potassium level to be normal and her creatinine back in the normal range, peaks in mid December at 1.61.    She has seen Dr. Harriet Saini from cardiology at my request because of aortic valve stenosis.  She denies any shortness of breath.  She is having some swelling on the top of the feet.  She has worked with Dr. Otero previously for varicose veins.  She had an echocardiogram performed.  Dr. Saini recommended heart valve clinic to discuss TAVR, patient was unaware.  There are also labs to be repeated after 2 weeks which would be tomorrow.  However the patient did not start furosemide for fear of her potassium dropping further.  She already takes hydrochlorothiazide and her blood pressures been running in the 130s in the last month.    PAST MEDICAL HISTORY:  Past Medical History:   Diagnosis Date    Aortic valve calcification 6/25/2015    UFCT    Arthritis     right arm    Atypical ductal hyperplasia of left breast 6/24/2015    Cataracts, bilateral 2015    small    Fibrocystic breast changes of both breasts 6/24/2015    Other and unspecified hyperlipidemia     Plantar fascial fibromatosis     PONV (postoperative nausea and vomiting)     Unspecified essential hypertension      PAST SURGICAL HISTORY:  Past Surgical History:   Procedure Laterality Date    BIOPSY OF BREAST, INCISIONAL  01/01/2007    right-negative    BREAST BIOPSY  07/22/2015    left breast biopsy.     FEMUR/KNEE SURG UNLISTED  01/01/1985    Right knee, cartilage    CAMPBELL BIOPSY STEREO NODULE 1 SITE RIGHT (CPT=19081)  02/2017    benign-hyalinized duct    CAMPBELL BIOPSY STEREO NODULE 2 SITE BILAT (CPT=19081/09664)  06/2015    atypia    CAMPBELL LOCALIZATION WIRE 1 SITE LEFT (CPT=19281)  07/2015    atypia    NEEDLE BIOPSY RIGHT  2008    benign.    OCT, RETINA - OS - LEFT EYE      PUNC/ASPIR BREAST CYST  01/01/2008     MEDICATIONS:  Current Outpatient Medications   Medication Sig Dispense Refill    potassium  chloride 20 MEQ Oral Tab CR Take 1 tablet (20 mEq total) by mouth daily. 30 tablet 3    hydroCHLOROthiazide 25 MG Oral Tab Take 2 tablets (50 mg total) by mouth daily.      ATORVASTATIN 40 MG Oral Tab TAKE 1 TABLET(40 MG) BY MOUTH EVERY NIGHT 90 tablet 3    latanoprost 0.005 % Ophthalmic Solution 1 drop nightly. AT BEDTIME      ibuprofen 200 MG Oral Tab Take 1 tablet (200 mg total) by mouth every 6 (six) hours as needed for Pain.      acetaminophen 500 MG Oral Tab Take 1 tablet (500 mg total) by mouth.      LASIX 20 MG Oral Tab Lasix 20 mg tablet, [RxNorm: 970235] (Patient not taking: Reported on 1/25/2024)       ALLERGIES:   Amlodipine; Amoxicillin; Antihistamines, loratadine-type; Codeine; Demerol; Lisinopril; and Zetia [ezetimibe]  FAMILY HISTORY  History reviewed. No pertinent family history.    PHYSICAL EXAM:  /64   Pulse 77   Resp 16   Ht 5' 1\" (1.549 m)   Wt 170 lb (77.1 kg)   SpO2 98%   BMI 32.12 kg/m²     Alert no acute distress breathing comfortably.  No conversational dyspnea.  She does not feel short of breath walking into the office from the car.  Neck normal card upstroke and volume.  Lungs good air exchange no crackles no wheezes.  Heart regular rate with decrescendo murmur at the right upper sternal border consistent with known aortic stenosis.  Lower extremities with moderately sized varicose veins on the top of the feet.  No significant edema at this time.    I reviewed Dr. Saini's note.    ASSESSMENT/PLAN:    1. Nonrheumatic aortic valve stenosis  I have sent a message to Dr. Saini regarding the heart valve clinic and what we need to do to get her there although the patient seems asymptomatic if I think the swelling in the feet is related to the varicose veins and not congestive heart failure  - CBC With Differential With Platelet; Future  - Pro Beta Natriuretic Peptide [E]; Future    2. Essential hypertension  Continue hydrochlorothiazide.  We will hold the furosemide for now because  she is not having any shortness of breath  - Comp Metabolic Panel (14); Future    3. Hypokalemia due to excessive renal loss of potassium  Normal on the 6.  Recheck now although not on furosemide  - Comp Metabolic Panel (14); Future    4. Pure hypercholesterolemia  6-month follow-up  - Comp Metabolic Panel (14); Future  - Lipid Panel; Future    5. Bilateral carotid artery stenosis  She actually had a carotid Doppler done through [facility] and I told her she can ignore this order  - US CAROTID DOPPLER BILAT - DIAG IMG (CPT=93880); Future    6. Impaired fasting glucose  Glucoses run between 88 and 107 over the last several years  - CBC With Differential With Platelet; Future  - Hemoglobin A1C; Future    7. Hypertensive heart disease with heart failure (HCC)    - Pro Beta Natriuretic Peptide [E]; Future     Copy of all labs to Dr. Saini.    If this note is coded by time based on the Office/Outpatient Evaluation and Management Codes effective January 1, 2021, the time includes reviewing the chart before entering the exam room, the time spent with the patient in face to face discussion and examination, and the time documenting the visit.  It may also include time ordering tests or reviewing test results completed on the same day.

## 2024-02-01 ENCOUNTER — HOSPITAL ENCOUNTER (OUTPATIENT)
Dept: CV DIAGNOSTICS | Facility: HOSPITAL | Age: 89
Discharge: HOME | End: 2024-02-01
Attending: INTERNAL MEDICINE
Payer: MEDICARE

## 2024-02-01 ENCOUNTER — HOSPITAL ENCOUNTER (OUTPATIENT)
Dept: CT IMAGING | Facility: HOSPITAL | Age: 89
Discharge: HOME | End: 2024-02-01
Attending: INTERNAL MEDICINE
Payer: MEDICARE

## 2024-02-01 ENCOUNTER — HOSPITAL ENCOUNTER (OUTPATIENT)
Dept: CARDIOLOGY CLINIC | Facility: HOSPITAL | Age: 89
Discharge: HOME | End: 2024-02-01
Attending: FAMILY MEDICINE
Payer: MEDICARE

## 2024-02-01 ENCOUNTER — HOSPITAL ENCOUNTER (OUTPATIENT)
Dept: LAB | Facility: HOSPITAL | Age: 89
Discharge: HOME | End: 2024-02-01
Attending: INTERNAL MEDICINE
Payer: MEDICARE

## 2024-02-01 VITALS — SYSTOLIC BLOOD PRESSURE: 144 MMHG | DIASTOLIC BLOOD PRESSURE: 39 MMHG | HEART RATE: 54 BPM

## 2024-02-01 DIAGNOSIS — I35.0 AORTIC STENOSIS: ICD-10-CM

## 2024-02-01 LAB
ATRIAL RATE: 57 BPM
P AXIS: 56 DEGREES
P-R INTERVAL: 142 MS
Q-T INTERVAL: 450 MS
QRS DURATION: 90 MS
QTC CALCULATION (BEZET): 438 MS
R AXIS: -22 DEGREES
T AXIS: 37 DEGREES
VENTRICULAR RATE: 57 BPM

## 2024-02-01 PROCEDURE — 71275 CT ANGIOGRAPHY CHEST: CPT | Performed by: INTERNAL MEDICINE

## 2024-02-01 PROCEDURE — 93005 ELECTROCARDIOGRAM TRACING: CPT

## 2024-02-01 PROCEDURE — 93010 ELECTROCARDIOGRAM REPORT: CPT | Performed by: INTERNAL MEDICINE

## 2024-02-01 PROCEDURE — 74174 CTA ABD&PLVS W/CONTRAST: CPT | Performed by: INTERNAL MEDICINE

## 2024-02-01 PROCEDURE — 99212 OFFICE O/P EST SF 10 MIN: CPT

## 2024-02-01 NOTE — IMAGING NOTE
Jinny Rodrígueztrop to CT Rm 4.     O2 applied via nasal cannula @ 2LPM.  Positioned pt on table. Procedure explained and questions answered. Vital signs monitored and noted in Flowsheet.  GFR = 44  Contrast injected followed by saline flush at 1218  Contrast = 85ml  0.9 NS flush = 75ml  Average HR = 54bpm    Patient tolerated the procedure without complication. Denies any contrast reaction.   Escorted pt back to Radiology holding area to complete rest of TAVR workup exams, discharged from CT in stable condition.

## 2024-02-02 NOTE — CONSULTS
[facility]    PATIENT'S NAME: NANCY TAYLOR   ATTENDING PHYSICIAN: He Wilkins M.D.   CONSULTING PHYSICIAN: Ryan Beckman M.D.   PATIENT ACCOUNT#:   [id number]    LOCATION:  [facility]  MEDICAL RECORD #:   [id number]       YOB: 1935  ADMISSION DATE:       02/01/2024      CONSULT DATE:  02/01/2024    REPORT OF CONSULTATION    HISTORY OF PRESENT ILLNESS:  This is the first office visit for the patient with me.  I am seeing her today in the [facility] for consideration of percutaneous transcatheter aortic valve replacement.    She is followed by my partner, Dr. Harriet Saini.  She has had a known murmur and echocardiography now has suggested that her stenosis has progressed to severe.  I looked back at several of her echocardiograms.  She has been in the moderate to moderate to severe range.  Indeed, her last study did identify peak velocities of 4.1 m/sec with a mean gradient of 39 mmHg.    She continues to have normal LV systolic function and at least from a day-to-day standpoint has not noticed any significant unstable symptomatology.  She has had no chest pain.  She has had no syncope, and she has had no heart failure symptomatology.    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  She has a tendency for some lower extremity edema which, in large part, would appear to represent venous insufficiency.    PHYSICAL EXAMINATION:  She is a very healthy and cognitively intact 89-year-old.    LUNGS:  Clear.   HEART:  She has an obvious aortic stenotic murmur.  No significant aortic insufficiency.    ABDOMEN:  Soft.    EXTREMITIES:  She does not have any significant edema on today's examination.  She does have underlying varicosities.    PLAN:  She is not known to have coronary disease; however, she has not had a cardiac catheterization.  Her last stress test in 2017 revealed no evidence of ischemia.    Her STS score is 9.43% and certainly, at age 89, a percutaneous approach  to valve replacement surgery would be preferred if technically feasible.    I think the real issue here at this point is the timing.  She is certainly not unstable and, personally, I would like to get to know her a bit and have she and her family think through our discussion today before proceeding.    We will proceed with imaging to assess her peripheral vasculature and sizing, and to make sure that the procedure itself is even technically feasible.    Her electrocardiogram reveals sinus rhythm with no significant ischemic change.    We had a nice discussion today along with her son.  We will plan to see each other again in 3 months and will make a decision moving forward based upon her clinical symptomatology, the results of her testing today, and her own personal preference for how to approach her valvular disease.    I think everyone has a nice understanding and seems comfortable with that approach.     Dictated By Ryan Beckman M.D.  d: 02/01/2024 17:48:41  t: 02/01/2024 18:11:41  Job [id number]  WS/

## 2024-02-08 ENCOUNTER — TELEPHONE (OUTPATIENT)
Dept: CARDIOLOGY CLINIC | Facility: HOSPITAL | Age: 89
End: 2024-02-08

## 2024-05-23 ENCOUNTER — HOSPITAL ENCOUNTER (OUTPATIENT)
Dept: CV DIAGNOSTICS | Facility: HOSPITAL | Age: 89
Discharge: HOME | End: 2024-05-23
Attending: INTERNAL MEDICINE

## 2024-05-23 DIAGNOSIS — I35.0 AORTIC STENOSIS: ICD-10-CM

## 2024-05-23 PROCEDURE — 93306 TTE W/DOPPLER COMPLETE: CPT | Performed by: INTERNAL MEDICINE

## 2024-06-06 DIAGNOSIS — E78.00 PURE HYPERCHOLESTEROLEMIA: ICD-10-CM

## 2024-06-06 RX ORDER — ATORVASTATIN CALCIUM 40 MG/1
TABLET, FILM COATED ORAL
Qty: 90 TABLET | Refills: 3 | Status: SHIPPED | OUTPATIENT
Start: 2024-06-06

## 2024-06-06 NOTE — TELEPHONE ENCOUNTER
A refill request was received for:  Requested Prescriptions     Pending Prescriptions Disp Refills    ATORVASTATIN 40 MG Oral Tab [Pharmacy Med Name: ATORVASTATIN 40MG TABLETS] 90 tablet 3     Sig: TAKE 1 TABLET(40 MG) BY MOUTH EVERY NIGHT         Component  Ref Range & Units 1/25/24 11:42 AM   Cholesterol, Total  <200 mg/dL 161   Comment: Desirable  <200 mg/dL  Borderline  200-239 mg/dL  High      >=240 mg/dL     HDL Cholesterol  40 - 59 mg/dL 62 High    Comment: Interpretive Information:  An HDL cholesterol <40 mg/dL is low and constitutes a coronary heart disease risk factor. An HDL cholesterol >60 mg/dL is a negative risk factor for coronary heart disease.     Triglycerides  30 - 149 mg/dL 114   Comment: Reference interval for fasting triglycerides  Desirable: <150 mg/dL  Borderline: 150-199 mg/dL  High: 200-499 mg/dL  Very High: >=500 mg/dL       LDL Cholesterol  <100 mg/dL 79   Comment: Desirable <100 mg/dL  Borderline 100-129 mg/dL  High     >=130mg/dL     VLDL  0 - 30 mg/dL 18   Non HDL Chol  <130 mg/dL 99        Last refill date: 5/22/2023      Last office visit:1/25/2024     Follow up due:  Future Appointments   Date Time Provider Department Center   8/29/2024  9:15 AM Rn, Structural Heart, RN [facility]

## 2024-06-20 ENCOUNTER — LAB ENCOUNTER (OUTPATIENT)
Dept: LAB | Age: 89
End: 2024-06-20
Attending: INTERNAL MEDICINE
Payer: MEDICARE

## 2024-06-20 DIAGNOSIS — I65.23 ATHEROSCLEROSIS OF BOTH CAROTID ARTERIES: ICD-10-CM

## 2024-06-20 DIAGNOSIS — I10 ESSENTIAL HYPERTENSION, MALIGNANT: ICD-10-CM

## 2024-06-20 DIAGNOSIS — E78.00 PURE HYPERCHOLESTEROLEMIA: Primary | ICD-10-CM

## 2024-06-20 DIAGNOSIS — R60.0 LOCALIZED EDEMA: ICD-10-CM

## 2024-06-20 LAB
ALBUMIN SERPL-MCNC: 4.4 G/DL (ref 3.2–4.8)
ALBUMIN/GLOB SERPL: 2.2 {RATIO} (ref 1–2)
ALP LIVER SERPL-CCNC: 85 U/L
ALT SERPL-CCNC: 14 U/L
ANION GAP SERPL CALC-SCNC: 9 MMOL/L (ref 0–18)
AST SERPL-CCNC: 18 U/L (ref ?–34)
BASOPHILS # BLD AUTO: 0.03 X10(3) UL (ref 0–0.2)
BASOPHILS NFR BLD AUTO: 0.2 %
BILIRUB SERPL-MCNC: 0.9 MG/DL (ref 0.2–1.1)
BNP SERPL-MCNC: 128 PG/ML
BUN BLD-MCNC: 34 MG/DL (ref 9–23)
BUN/CREAT SERPL: 30.6 (ref 10–20)
CALCIUM BLD-MCNC: 9.6 MG/DL (ref 8.7–10.4)
CHLORIDE SERPL-SCNC: 111 MMOL/L (ref 98–112)
CHOLEST SERPL-MCNC: 173 MG/DL (ref ?–200)
CO2 SERPL-SCNC: 26 MMOL/L (ref 21–32)
CREAT BLD-MCNC: 1.11 MG/DL
DEPRECATED RDW RBC AUTO: 46.5 FL (ref 35.1–46.3)
EGFRCR SERPLBLD CKD-EPI 2021: 48 ML/MIN/1.73M2 (ref 60–?)
EOSINOPHIL # BLD AUTO: 0.11 X10(3) UL (ref 0–0.7)
EOSINOPHIL NFR BLD AUTO: 0.9 %
ERYTHROCYTE [DISTWIDTH] IN BLOOD BY AUTOMATED COUNT: 13.8 % (ref 11–15)
EST. AVERAGE GLUCOSE BLD GHB EST-MCNC: 140 MG/DL (ref 68–126)
FASTING PATIENT LIPID ANSWER: YES
FASTING STATUS PATIENT QL REPORTED: YES
GLOBULIN PLAS-MCNC: 2 G/DL (ref 2–3.5)
GLUCOSE BLD-MCNC: 87 MG/DL (ref 70–99)
HBA1C MFR BLD: 6.5 % (ref ?–5.7)
HCT VFR BLD AUTO: 35.6 %
HDLC SERPL-MCNC: 54 MG/DL (ref 40–59)
HGB BLD-MCNC: 11.4 G/DL
IMM GRANULOCYTES # BLD AUTO: 0.31 X10(3) UL (ref 0–1)
IMM GRANULOCYTES NFR BLD: 2.5 %
LDLC SERPL CALC-MCNC: 100 MG/DL (ref ?–100)
LYMPHOCYTES # BLD AUTO: 1.85 X10(3) UL (ref 1–4)
LYMPHOCYTES NFR BLD AUTO: 14.7 %
MCH RBC QN AUTO: 29.5 PG (ref 26–34)
MCHC RBC AUTO-ENTMCNC: 32 G/DL (ref 31–37)
MCV RBC AUTO: 92 FL
MONOCYTES # BLD AUTO: 1 X10(3) UL (ref 0.1–1)
MONOCYTES NFR BLD AUTO: 7.9 %
NEUTROPHILS # BLD AUTO: 9.31 X10 (3) UL (ref 1.5–7.7)
NEUTROPHILS # BLD AUTO: 9.31 X10(3) UL (ref 1.5–7.7)
NEUTROPHILS NFR BLD AUTO: 73.8 %
NONHDLC SERPL-MCNC: 119 MG/DL (ref ?–130)
OSMOLALITY SERPL CALC.SUM OF ELEC: 309 MOSM/KG (ref 275–295)
PLATELET # BLD AUTO: 199 10(3)UL (ref 150–450)
POTASSIUM SERPL-SCNC: 3.7 MMOL/L (ref 3.5–5.1)
PROT SERPL-MCNC: 6.4 G/DL (ref 5.7–8.2)
RBC # BLD AUTO: 3.87 X10(6)UL
SODIUM SERPL-SCNC: 146 MMOL/L (ref 136–145)
TRIGL SERPL-MCNC: 108 MG/DL (ref 30–149)
TSI SER-ACNC: 1.55 MIU/ML (ref 0.55–4.78)
VLDLC SERPL CALC-MCNC: 18 MG/DL (ref 0–30)
WBC # BLD AUTO: 12.6 X10(3) UL (ref 4–11)

## 2024-06-20 PROCEDURE — 83036 HEMOGLOBIN GLYCOSYLATED A1C: CPT

## 2024-06-20 PROCEDURE — 85025 COMPLETE CBC W/AUTO DIFF WBC: CPT

## 2024-06-20 PROCEDURE — 80061 LIPID PANEL: CPT

## 2024-06-20 PROCEDURE — 84443 ASSAY THYROID STIM HORMONE: CPT

## 2024-06-20 PROCEDURE — 36415 COLL VENOUS BLD VENIPUNCTURE: CPT

## 2024-06-20 PROCEDURE — 80053 COMPREHEN METABOLIC PANEL: CPT

## 2024-06-20 PROCEDURE — 83880 ASSAY OF NATRIURETIC PEPTIDE: CPT

## 2024-07-03 ENCOUNTER — HOSPITAL ENCOUNTER (OUTPATIENT)
Age: 89
Discharge: HOME | End: 2024-07-03
Payer: MEDICARE

## 2024-07-03 ENCOUNTER — APPOINTMENT (OUTPATIENT)
Dept: GENERAL RADIOLOGY | Age: 89
End: 2024-07-03
Attending: PHYSICIAN ASSISTANT
Payer: MEDICARE

## 2024-07-03 VITALS
BODY MASS INDEX: 31.34 KG/M2 | TEMPERATURE: 99 F | RESPIRATION RATE: 18 BRPM | DIASTOLIC BLOOD PRESSURE: 54 MMHG | HEART RATE: 77 BPM | OXYGEN SATURATION: 100 % | WEIGHT: 166 LBS | HEIGHT: 61 IN | SYSTOLIC BLOOD PRESSURE: 155 MMHG

## 2024-07-03 DIAGNOSIS — L03.031 CELLULITIS OF TOE OF RIGHT FOOT: Primary | ICD-10-CM

## 2024-07-03 PROCEDURE — 99213 OFFICE O/P EST LOW 20 MIN: CPT

## 2024-07-03 PROCEDURE — 90471 IMMUNIZATION ADMIN: CPT

## 2024-07-03 PROCEDURE — 73630 X-RAY EXAM OF FOOT: CPT | Performed by: PHYSICIAN ASSISTANT

## 2024-07-03 PROCEDURE — 99214 OFFICE O/P EST MOD 30 MIN: CPT

## 2024-07-03 RX ORDER — SULFAMETHOXAZOLE AND TRIMETHOPRIM 800; 160 MG/1; MG/1
1 TABLET ORAL 2 TIMES DAILY
Qty: 14 TABLET | Refills: 0 | Status: SHIPPED | OUTPATIENT
Start: 2024-07-03 | End: 2024-07-03

## 2024-07-03 RX ORDER — PREDNISONE 20 MG/1
40 TABLET ORAL DAILY
Qty: 10 TABLET | Refills: 0 | Status: SHIPPED | OUTPATIENT
Start: 2024-07-03 | End: 2024-07-08

## 2024-07-03 RX ORDER — LISINOPRIL 2.5 MG/1
2.5 TABLET ORAL DAILY
COMMUNITY
Start: 2024-06-27

## 2024-07-03 RX ORDER — SULFAMETHOXAZOLE AND TRIMETHOPRIM 800; 160 MG/1; MG/1
1 TABLET ORAL 2 TIMES DAILY
Qty: 20 TABLET | Refills: 0 | Status: SHIPPED | OUTPATIENT
Start: 2024-07-03 | End: 2024-07-13

## 2024-07-03 NOTE — DISCHARGE INSTRUCTIONS
As we discussed this could be related to gout - gout is a form of inflammation from crystals usually will improve with steroids  We will cover for skin infection as well with bactrim  Close follow up primary care doctor, foot specialist also provided  Return to the ER if symptoms worsen

## 2024-07-03 NOTE — ED PROVIDER NOTES
Patient Seen in: [facility]      History     Chief Complaint   Patient presents with    Toe Pain     Stated Complaint: right foot by toe, looks like a bite - throbbing, pain and swollen  - doesnt re*    Subjective:   The history is provided by the patient.       89-year-old female with past medical history of aortic valve calcification, hypertension, HLD presents to the IC due to right great toe redness and swelling x 3 days. Started after working in her garden with sandals - afraid she might have been bit by a spider. The redness is not increasing but painful bearing weight. No injury or trauma. No fevers, hx of skin infection.     Objective:   Past Medical History:    Aortic valve calcification    UFCT    Arthritis    right arm    Atypical ductal hyperplasia of left breast    Cataracts, bilateral    small    Fibrocystic breast changes of both breasts    Other and unspecified hyperlipidemia    Plantar fascial fibromatosis    PONV (postoperative nausea and vomiting)    Unspecified essential hypertension              Past Surgical History:   Procedure Laterality Date    Biopsy of breast, incisional  01/01/2007    right-negative    Breast biopsy  07/22/2015    left breast biopsy.     Femur/knee surg unlisted  01/01/1985    Right knee, cartilage    Jenn biopsy stereo nodule 1 site right (cpt=19081)  02/2017    benign-hyalinized duct    Jenn biopsy stereo nodule 2 site bilat (cpt=19081/00196)  06/2015    atypia    Jenn localization wire 1 site left (cpt=19281)  07/2015    atypia    Needle biopsy right  2008    benign.    Oct, retina - os - left eye      Punc/aspir breast cyst  01/01/2008                No pertinent social history.            Review of Systems   Constitutional: Negative.    Respiratory: Negative.     Cardiovascular: Negative.    Gastrointestinal: Negative.    Musculoskeletal:  Positive for joint swelling.   Skin:  Positive for color change.   Neurological: Negative.        Positive for  stated Chief Complaint: Toe Pain    Other systems are as noted in HPI.  Constitutional and vital signs reviewed.      All other systems reviewed and negative except as noted above.    Physical Exam     ED Triage Vitals [07/03/24 1218]   /54   Pulse 77   Resp 18   Temp 98.8 °F (37.1 °C)   Temp src Temporal   SpO2 100 %   O2 Device None (Room air)       Current Vitals:   Vital Signs  BP: 155/54  Pulse: 77  Resp: 18  Temp: 98.8 °F (37.1 °C)  Temp src: Temporal    Oxygen Therapy  SpO2: 100 %  O2 Device: None (Room air)            Physical Exam  Vitals and nursing note reviewed.   Constitutional:       General: She is not in acute distress.     Appearance: Normal appearance.   Pulmonary:      Effort: Pulmonary effort is normal.   Musculoskeletal:      Comments: Ankle no bony tenderness full range of motion.  Right foot with isolated redness and edema over the first MTP joint.  Minimally warm to the touch extremely tender.  Rest the toe is no bony tenderness good cap refill.  Sensation is intact.  No other bony tenderness on the foot.  Compartments are soft.  Varicose veins are noted   Skin:     Capillary Refill: Capillary refill takes less than 2 seconds.   Neurological:      General: No focal deficit present.      Mental Status: She is alert and oriented to person, place, and time.   Psychiatric:         Mood and Affect: Mood normal.         Behavior: Behavior normal.               ED Course   Labs Reviewed - No data to display          XR FOOT, COMPLETE (MIN 3 VIEWS), RIGHT (CPT=73630)    Result Date: 7/3/2024  PROCEDURE:  XR FOOT, COMPLETE (MIN 3 VIEWS), RIGHT (CPT=73630)  TECHNIQUE:  AP, oblique, and lateral views were obtained.  COMPARISON:  None.  INDICATIONS:  right foot by toe, looks like a bite - throbbing, pain and swollen  - doesnt recall hitting it or tripping on it  PATIENT STATED HISTORY: (As transcribed by Technologist)  Pain,redness and swelling in right foot especially at distal/lateral side.     FINDINGS:  No acute fractures or osseous lesions are identified.  Phalangeal relationships are within normal limits.  No significant ankle joint effusion.  Calcaneal enthesophytes.  Osteoarthritic changes.            CONCLUSION:  No acute fractures.  Osteoarthritic changes.   LOCATION:  [facility]   Dictated by (CST): Omari Hester MD on 7/03/2024 at 12:58 PM     Finalized by (CST): Omari Hester MD on 7/03/2024 at 12:59 PM               MDM   Ddx -osteoarthritis flare, gout, cellulitis, septic joint    On exam the patient is afebrile nontoxic.  Ambulating with a steady gait.  Isolated erythema and edema of the first MTP joint.  Minimal wound to the touch.  No streaking.  No other bony tenderness compartments are soft neurovascular intact.  Full range of motion of the toe with a painful clinically low suspicion for septic joint.  X-rays performed showing no acute findings but osteoarthritis is noted.  Gout for cellulitis is most likely the etiology.  Clinical exam is more consistent with gout however patient states she is concerned she was bit by a bug in her garden and had a slow progression of worsening symptoms over the last 2 days.  Will treat with for both cellulitis and gout.  Tetanus up dated.  Will avoid Keflex as the patient has an anaphylactic response to penicillins.  Patient has tolerated Bactrim in the past and therefore was given Bactrim along with prednisone for gout treatment.  Discussed at length with the patient the need for close follow-up primary and podiatry follow-up was provided.  All questions were answered and the patient into the treatment plan and discharge home.                             Medical Decision Making  Problems Addressed:  Cellulitis of toe of right foot: acute illness or injury    Amount and/or Complexity of Data Reviewed  Independent Historian:      Details: Family member  Radiology: ordered and independent interpretation performed. Decision-making details documented in ED  Course.    Risk  OTC drugs.  Prescription drug management.        Disposition and Plan     Clinical Impression:  1. Cellulitis of toe of right foot         Disposition:  Discharge  7/3/2024  1:15 pm    Follow-up:  No follow-up provider specified.        Medications Prescribed:  Discharge Medication List as of 7/3/2024  1:17 PM        START taking these medications    Details   predniSONE 20 MG Oral Tab Take 2 tablets (40 mg total) by mouth daily for 5 days., Normal, Disp-10 tablet, R-0

## 2024-07-16 ENCOUNTER — OFFICE VISIT (OUTPATIENT)
Dept: FAMILY MEDICINE CLINIC | Facility: CLINIC | Age: 89
End: 2024-07-16
Payer: MEDICARE

## 2024-07-16 VITALS
RESPIRATION RATE: 16 BRPM | BODY MASS INDEX: 31.34 KG/M2 | DIASTOLIC BLOOD PRESSURE: 68 MMHG | OXYGEN SATURATION: 98 % | SYSTOLIC BLOOD PRESSURE: 138 MMHG | WEIGHT: 166 LBS | HEART RATE: 76 BPM | HEIGHT: 61 IN

## 2024-07-16 DIAGNOSIS — K14.3 BLACK TONGUE: ICD-10-CM

## 2024-07-16 DIAGNOSIS — M10.071 ACUTE IDIOPATHIC GOUT INVOLVING TOE OF RIGHT FOOT: ICD-10-CM

## 2024-07-16 PROCEDURE — 99212 OFFICE O/P EST SF 10 MIN: CPT | Performed by: FAMILY MEDICINE

## 2024-07-16 NOTE — PROGRESS NOTES
Follow-up from the urgent care where she was seen at the beginning of the month for swollen painful red great toe of the right foot.  Pain seems to be focused at the MTP joint.  Most likely gout although she had been in the garden the day before and was concerned about a potential bite.  They treated her for both gout with prednisone and cellulitis with Bactrim.  She has responded well.  Her pain is very minimal at this time.  The redness resolved within 2 days.  She never had gout before.  She denies any large protein meal just prior to the event.    She has noticed black on the tongue.  This precedes the prescription of Bactrim.  She has been using a new mouthwash.  She also admits to tooth Samuel with peroxide in it.  Dr. Saini her cardiologist suggested a multivitamin.  She is not certain if it has iron in it.  She drinks tea about 4 cups a day but that has not changed recently.  Not a coffee drinker.  Occasional Tums but never Pepto-Bismol.    PAST MEDICAL HISTORY:  Past Medical History:    Aortic valve calcification    UFCT    Arthritis    right arm    Atypical ductal hyperplasia of left breast    Cataracts, bilateral    small    Fibrocystic breast changes of both breasts    Other and unspecified hyperlipidemia    Plantar fascial fibromatosis    PONV (postoperative nausea and vomiting)    Unspecified essential hypertension     PAST SURGICAL HISTORY:  Past Surgical History:   Procedure Laterality Date    Biopsy of breast, incisional  01/01/2007    right-negative    Breast biopsy  07/22/2015    left breast biopsy.     Femur/knee surg unlisted  01/01/1985    Right knee, cartilage    Jenn biopsy stereo nodule 1 site right (cpt=19081)  02/2017    benign-hyalinized duct    Jenn biopsy stereo nodule 2 site bilat (cpt=19081/22614)  06/2015    atypia    Jenn localization wire 1 site left (cpt=19281)  07/2015    atypia    Needle biopsy right  2008    benign.    Oct, retina - os - left eye      Punc/aspir breast cyst   01/01/2008     MEDICATIONS:  Current Outpatient Medications   Medication Sig Dispense Refill    lisinopril 2.5 MG Oral Tab Take 1 tablet (2.5 mg total) by mouth daily.      ATORVASTATIN 40 MG Oral Tab TAKE 1 TABLET(40 MG) BY MOUTH EVERY NIGHT 90 tablet 3    hydroCHLOROthiazide 25 MG Oral Tab Take 2 tablets (50 mg total) by mouth daily.      latanoprost 0.005 % Ophthalmic Solution 1 drop nightly. AT BEDTIME      ibuprofen 200 MG Oral Tab Take 1 tablet (200 mg total) by mouth every 6 (six) hours as needed for Pain.      acetaminophen 500 MG Oral Tab Take 1 tablet (500 mg total) by mouth.       ALLERGIES:   Amlodipine; Amoxicillin; Antihistamines, loratadine-type; Codeine; Demerol; Lisinopril; and Zetia [ezetimibe]  FAMILY HISTORY  No family history on file.    PHYSICAL EXAM:  /68   Pulse 76   Resp 16   Ht 5' 1\" (1.549 m)   Wt 166 lb (75.3 kg)   SpO2 98%   BMI 31.37 kg/m²     Alert no acute distress breathing comfortably.  Tongue to black streaks adjacent to the midline.  Groin no lymph nodes, right popliteal fossa no lymph nodes.  Foot some tenderness at the inferior aspect of the first MTP joint.  Not red warm or swollen at this time.    ASSESSMENT/PLAN:    1. Acute idiopathic gout involving toe of right foot  Based on the location I suspect gout.  We discussed allopurinol if she starts to have recurrences.    2. Black tongue  May be secondary to the peroxide in the tooth whitening.  Also check the amount of peroxide in the mouthwash.  She has been drinking this amount of tea for a long time so unlikely to be the cause.  Bactrim does not list this and it happened before the gout attack.  Black hairy tongue is in the differential although she has good dental hygiene.         If this note is coded by time based on the Office/Outpatient Evaluation and Management Codes effective January 1, 2021, the time includes reviewing the chart before entering the exam room, the time spent with the patient in face to  face discussion and examination, and the time documenting the visit.  It may also include time ordering tests or reviewing test results completed on the same day.

## 2024-08-29 ENCOUNTER — HOSPITAL ENCOUNTER (OUTPATIENT)
Dept: CV DIAGNOSTICS | Facility: HOSPITAL | Age: 89
Discharge: HOME | End: 2024-08-29
Attending: INTERNAL MEDICINE
Payer: MEDICARE

## 2024-08-29 ENCOUNTER — HOSPITAL ENCOUNTER (OUTPATIENT)
Dept: CARDIOLOGY CLINIC | Facility: HOSPITAL | Age: 89
Discharge: HOME | End: 2024-08-29
Attending: INTERNAL MEDICINE
Payer: MEDICARE

## 2024-08-29 DIAGNOSIS — E87.6 HYPOKALEMIA DUE TO EXCESSIVE RENAL LOSS OF POTASSIUM: ICD-10-CM

## 2024-08-29 DIAGNOSIS — I83.893 VARICOSE VEINS OF BOTH LEGS WITH EDEMA: ICD-10-CM

## 2024-08-29 DIAGNOSIS — I35.9 AORTIC VALVE CALCIFICATION: ICD-10-CM

## 2024-08-29 DIAGNOSIS — M17.11 PRIMARY OSTEOARTHRITIS OF RIGHT KNEE: ICD-10-CM

## 2024-08-29 DIAGNOSIS — R45.89 DEPRESSED MOOD: ICD-10-CM

## 2024-08-29 DIAGNOSIS — H25.013 CORTICAL AGE-RELATED CATARACT OF BOTH EYES: ICD-10-CM

## 2024-08-29 DIAGNOSIS — I10 ESSENTIAL HYPERTENSION: ICD-10-CM

## 2024-08-29 DIAGNOSIS — E78.00 PURE HYPERCHOLESTEROLEMIA: ICD-10-CM

## 2024-08-29 DIAGNOSIS — I35.0 NONRHEUMATIC AORTIC VALVE STENOSIS: ICD-10-CM

## 2024-08-29 PROCEDURE — 93306 TTE W/DOPPLER COMPLETE: CPT | Performed by: INTERNAL MEDICINE

## 2024-08-29 PROCEDURE — 99212 OFFICE O/P EST SF 10 MIN: CPT

## 2024-08-29 NOTE — PROGRESS NOTES
Given TAVR date of 9/26, will have PAT visit on 9/19 at 11:00.    35 year old female, PMHx of asthma, endometriosis; presents to the ED complaining of shortness of breath, headache, chills, subjective fevers, sore throat x 2 days. Patient states she works at [employer] and one of her coworkers tested positive for coronavirus. Yesterday, she woke up feeling short of breath, headache, chills, sore throat and subjective fevers. Today, she was having increased shortness of breath, prompting her to come to the ED for evaluation. Patient does not have an inhaler at home. 35 year old female, PMHx of asthma, endometriosis; presents to the ED complaining of shortness of breath, headache, chills, subjective fevers, sore throat x 2 days. Patient states she works at [employer] and one of her coworkers tested positive for coronavirus. Yesterday, she woke up with her symptoms. Today, she was having increased shortness of breath, prompting her to come to the ED for evaluation. Patient does not have an inhaler at home. She denies abdominal pain, nausea, vomiting, diarrhea or other complaints.

## 2024-08-29 NOTE — CONSULTS
[facility]    PATIENT'S NAME: NANCY TAYLOR   ATTENDING PHYSICIAN: Ryan Beckman M.D.   CONSULTING PHYSICIAN: Ryan Beckman M.D.   PATIENT ACCOUNT#:   [id number]    LOCATION:  [facility]  MEDICAL RECORD #:   [id number]       YOB: 1935  ADMISSION DATE:       08/29/2024      CONSULT DATE:  08/29/2024    REPORT OF CONSULTATION    HISTORY OF PRESENT ILLNESS:  I am seeing the patient today in the Structural Heart Clinic for consideration of percutaneous transcatheter aortic valve replacement.    We initially saw each other in February 2024, when she was referred to the valve clinic by Dr. Harriet Saini for consideration of valve replacement due to echocardiographic evidence of progressive aortic stenosis.  At that time, peak velocity across the valve was 4.1 m/second with a mean gradient of 39 mmHg, and although she met echocardiographic criteria for intervention, she was asymptomatic and opted to be followed clinically.    I saw her again on May 23.  She was doing well with no real change in her clinical status.  Her son, Deshawn, was with her again, and multiple issues were reviewed regarding the procedure, potential complications, potential outcomes, and alternative approaches to management.    At that time it was once again decided that we would see each other in a few months to reassess her status, and that is what precipitated today's visit.    She continues to look quite healthy and remains independent; however, she has noted a definite falloff in energy and endurance over the last 3 months.  She now becomes quite winded doing day-to-day activities.  She has had no overt heart failure.  No syncope.    She has already had her CT imaging performed.  Calcium score was 72, with no suggestion of significant coronary obstruction.  Her peripheral vasculature was felt to be adequate to accommodate her percutaneous valve.  There was no evidence of ascending aneurysm.    PHYSICAL EXAMINATION:     LUNGS:  Clear.    HEART:  She has an obvious aortic stenotic murmur.    I reviewed her followup echocardiogram today.  She continues to demonstrate preserved LV systolic function.  However, her mean gradient has now risen from 40 mmHg to 46 mmHg.    She has been seen by our surgical service.  Her STS score is 9.43%, and it has been recommended that should she move forward with valve replacement that it be a percutaneous approach.    ASSESSMENT AND PLAN:  We once again had a thorough discussion regarding the risks, benefits, and alternatives to the procedure.  She and her son asked very appropriate questions, which were reviewed in detail.  I explained to her that there is certainly a risk for requiring a permanent pacemaker.    She and her son will discuss this further, but at this point, given the change in her clinical symptomatology and the progressive severity of her stenosis, I have recommended that she consider moving forward with valve replacement this fall.    I think everyone has a nice understanding and will move forward from there depending upon the wishes of she and her son.    Dictated By Ryan Beckman M.D.  d: 08/29/2024 16:34:23  t: 08/29/2024 17:39:06  [facility] [id number]  /

## 2024-08-30 PROCEDURE — 99212 OFFICE O/P EST SF 10 MIN: CPT

## 2024-09-11 DIAGNOSIS — I10 ESSENTIAL HYPERTENSION: ICD-10-CM

## 2024-09-11 RX ORDER — HYDROCHLOROTHIAZIDE 25 MG/1
50 TABLET ORAL DAILY
Qty: 180 TABLET | Refills: 0 | Status: SHIPPED | OUTPATIENT
Start: 2024-09-11

## 2024-09-11 NOTE — TELEPHONE ENCOUNTER
A refill request was received for:  Requested Prescriptions     Pending Prescriptions Disp Refills    hydroCHLOROthiazide 25 MG Oral Tab 180 tablet 0     Sig: Take 2 tablets (50 mg total) by mouth daily.       Last refill date:   12/6/2023    Last office visit: 7/16/2024    Follow up due:  Future Appointments   Date Time Provider Department Center   9/19/2024 11:00 AM Rn, Structural Heart, RN [facility]   9/19/2024 11:30 AM EH CARD PHLEBOTOMY RM1 [facility]   9/19/2024 11:30 AM [facility] HOLTER RM [facility]

## 2024-09-19 ENCOUNTER — HOSPITAL ENCOUNTER (OUTPATIENT)
Dept: CV DIAGNOSTICS | Facility: HOSPITAL | Age: 89
Discharge: HOME | End: 2024-09-19
Attending: INTERNAL MEDICINE
Payer: MEDICARE

## 2024-09-19 ENCOUNTER — HOSPITAL ENCOUNTER (OUTPATIENT)
Dept: GENERAL RADIOLOGY | Facility: HOSPITAL | Age: 89
Discharge: HOME | End: 2024-09-19
Attending: INTERNAL MEDICINE
Payer: MEDICARE

## 2024-09-19 ENCOUNTER — HOSPITAL ENCOUNTER (OUTPATIENT)
Dept: LAB | Facility: HOSPITAL | Age: 89
Discharge: HOME | End: 2024-09-19
Attending: INTERNAL MEDICINE
Payer: MEDICARE

## 2024-09-19 ENCOUNTER — HOSPITAL ENCOUNTER (OUTPATIENT)
Dept: CARDIOLOGY CLINIC | Facility: HOSPITAL | Age: 89
Discharge: HOME | End: 2024-09-19
Attending: INTERNAL MEDICINE
Payer: MEDICARE

## 2024-09-19 DIAGNOSIS — I10 HTN (HYPERTENSION): ICD-10-CM

## 2024-09-19 DIAGNOSIS — I35.0 AORTIC STENOSIS: ICD-10-CM

## 2024-09-19 DIAGNOSIS — I50.9 CHF (CONGESTIVE HEART FAILURE) (HCC): ICD-10-CM

## 2024-09-19 DIAGNOSIS — I35.0 AORTIC VALVE STENOSIS, ETIOLOGY OF CARDIAC VALVE DISEASE UNSPECIFIED: Primary | ICD-10-CM

## 2024-09-19 LAB
ALBUMIN SERPL-MCNC: 4.5 G/DL (ref 3.2–4.8)
ALBUMIN/GLOB SERPL: 2 {RATIO} (ref 1–2)
ALP LIVER SERPL-CCNC: 90 U/L
ALT SERPL-CCNC: 11 U/L
ANION GAP SERPL CALC-SCNC: 9 MMOL/L (ref 0–18)
ANTIBODY SCREEN: NEGATIVE
AST SERPL-CCNC: 20 U/L (ref ?–34)
ATRIAL RATE: 50 BPM
BILIRUB SERPL-MCNC: 0.6 MG/DL (ref 0.2–1.1)
BUN BLD-MCNC: 40 MG/DL (ref 9–23)
CALCIUM BLD-MCNC: 9.7 MG/DL (ref 8.7–10.4)
CHLORIDE SERPL-SCNC: 108 MMOL/L (ref 98–112)
CO2 SERPL-SCNC: 25 MMOL/L (ref 21–32)
CREAT BLD-MCNC: 1.13 MG/DL
EGFRCR SERPLBLD CKD-EPI 2021: 47 ML/MIN/1.73M2 (ref 60–?)
ERYTHROCYTE [DISTWIDTH] IN BLOOD BY AUTOMATED COUNT: 14.1 %
GLOBULIN PLAS-MCNC: 2.3 G/DL (ref 2–3.5)
GLUCOSE BLD-MCNC: 95 MG/DL (ref 70–99)
HCT VFR BLD AUTO: 32 %
HGB BLD-MCNC: 10.6 G/DL
MCH RBC QN AUTO: 29.3 PG (ref 26–34)
MCHC RBC AUTO-ENTMCNC: 33.1 G/DL (ref 31–37)
MCV RBC AUTO: 88.4 FL
NT-PROBNP SERPL-MCNC: 991 PG/ML (ref ?–450)
OSMOLALITY SERPL CALC.SUM OF ELEC: 304 MOSM/KG (ref 275–295)
P AXIS: 76 DEGREES
P-R INTERVAL: 148 MS
PLATELET # BLD AUTO: 214 10(3)UL (ref 150–450)
POTASSIUM SERPL-SCNC: 3.5 MMOL/L (ref 3.5–5.1)
PROT SERPL-MCNC: 6.8 G/DL (ref 5.7–8.2)
Q-T INTERVAL: 460 MS
QRS DURATION: 92 MS
QTC CALCULATION (BEZET): 419 MS
R AXIS: -14 DEGREES
RBC # BLD AUTO: 3.62 X10(6)UL
RH BLOOD TYPE: POSITIVE
SODIUM SERPL-SCNC: 142 MMOL/L (ref 136–145)
T AXIS: 36 DEGREES
VENTRICULAR RATE: 50 BPM
WBC # BLD AUTO: 15.4 X10(3) UL (ref 4–11)

## 2024-09-19 PROCEDURE — 71045 X-RAY EXAM CHEST 1 VIEW: CPT | Performed by: INTERNAL MEDICINE

## 2024-09-19 PROCEDURE — 80053 COMPREHEN METABOLIC PANEL: CPT | Performed by: INTERNAL MEDICINE

## 2024-09-19 PROCEDURE — 86850 RBC ANTIBODY SCREEN: CPT | Performed by: INTERNAL MEDICINE

## 2024-09-19 PROCEDURE — 85027 COMPLETE CBC AUTOMATED: CPT | Performed by: INTERNAL MEDICINE

## 2024-09-19 PROCEDURE — 36415 COLL VENOUS BLD VENIPUNCTURE: CPT | Performed by: INTERNAL MEDICINE

## 2024-09-19 PROCEDURE — 99213 OFFICE O/P EST LOW 20 MIN: CPT | Performed by: NURSE PRACTITIONER

## 2024-09-19 PROCEDURE — 86900 BLOOD TYPING SEROLOGIC ABO: CPT | Performed by: INTERNAL MEDICINE

## 2024-09-19 PROCEDURE — 83880 ASSAY OF NATRIURETIC PEPTIDE: CPT | Performed by: INTERNAL MEDICINE

## 2024-09-19 PROCEDURE — 93005 ELECTROCARDIOGRAM TRACING: CPT

## 2024-09-19 PROCEDURE — 93010 ELECTROCARDIOGRAM REPORT: CPT | Performed by: INTERNAL MEDICINE

## 2024-09-19 PROCEDURE — 86901 BLOOD TYPING SEROLOGIC RH(D): CPT | Performed by: INTERNAL MEDICINE

## 2024-09-19 NOTE — PROGRESS NOTES
[facility]  Cardiology H & P    Jinny Ocampo Patient Status:  Outpatient    1935 MRN [id number]   Location [facility] Attending Ryan Beckman MD   Hosp Day # 0 PCP Geoff Cm MD     History of Present Illness: Jinny Ocampo is a pleasant 89 year old female with severe, symptomatic aortic stenosis who presents for PAT for upcoming TAVR procedure.  She was evaluated by the Multidisciplinary Structural Heart Team and deemed a suitable TAVR candidate.  She currently denies:  SOB, CP, palpitations, fever, cough, chills, N/V, diarrhea.     Past Surgical/Medical History:  Past Medical History:    Aortic valve calcification    UFCT    Arthritis    right arm    Atypical ductal hyperplasia of left breast    Cataracts, bilateral    small    Fibrocystic breast changes of both breasts    Other and unspecified hyperlipidemia    Plantar fascial fibromatosis    PONV (postoperative nausea and vomiting)    Unspecified essential hypertension     Past Surgical History:   Procedure Laterality Date    Biopsy of breast, incisional  2007    right-negative    Breast biopsy  2015    left breast biopsy.     Femur/knee surg unlisted  1985    Right knee, cartilage    Jenn biopsy stereo nodule 1 site right (cpt=19081)  2017    benign-hyalinized duct    Jenn biopsy stereo nodule 2 site bilat (cpt=19081/00499)  2015    atypia    Jenn localization wire 1 site left (cpt=19281)  2015    atypia    Needle biopsy right      benign.    Oct, retina - os - left eye      Punc/aspir breast cyst  2008       Social History:  Social History     Socioeconomic History    Marital status:    Tobacco Use    Smoking status: Never    Smokeless tobacco: Never   Vaping Use    Vaping status: Never Used   Substance and Sexual Activity    Alcohol use: Yes     Comment: socially only on Holidays    Drug use: No    Sexual activity: Not Currently     Partners: Male   Other  Topics Concern    Caffeine Concern Yes     Comment: 3-4 cups daily     Exercise Yes     Comment: 4x a week    Seat Belt Yes        Family History:  No family history on file.     Allergies:  Amlodipine; Amoxicillin; Antihistamines, loratadine-type; Codeine; Demerol; Lisinopril; and Zetia [ezetimibe]    Review of Systems:  Unremarkable except pertinent systems as noted in history of present illness    Physical Exam:   There were no vitals taken for this visit.   Wt Readings from Last 1 Encounters:   07/16/24 166 lb (75.3 kg)       General: Alert and oriented in no apparent distress.  HEENT: No focal deficits.  Neck: No JVD, carotids 2+ no bruits.  Cardiac: Regular rate and rhythm, S1, S2 normal,  + JUAN PABLO  Lungs: Clear without wheezes, rales, rhonchi or dullness.  Normal excursions and effort.  Abdomen: Soft, non-tender, ND, BS +  Extremities: Without clubbing, cyanosis or edema.  Peripheral pulses are 2+.  Neurologic: Alert and oriented, normal affect.  Skin: Warm and dry. No rashes, lesions, or masses noted      Laboratory and Data:  Diagnostics:  Gated CTA: 2/1/24  Calcified, trileaflet aortic valve with aortic valve annulus measuring 24.8 mm x 19.6 mm.  Average diameter of 22.3 mm, area of 3.9 cm^2 and perimeter of 71 mm.  Sinuses of Valsalva diameter of 2.9 cm x 2.9 cm x 2.9 cm and Sinotubular junction (STJ) with average diameter of 2.6 cm.  Normal caliber thoracic aorta with max diameter of 3.4 cm above the STJ.  No significant subannular/LVOT calcifications noted.  Right dominant coronary artery system as outlined above.  No significant fatpad noted at the LV apex.    Echo: 8/29/24:  1. Left ventricle: The cavity size was normal. Wall thickness was mildly      increased. Systolic function was normal. The estimated ejection fraction      was 55-60%. No diagnostic evidence for regional wall motion      abnormalities. Features are consistent with a pseudonormal left      ventricular filling pattern, with concomitant  abnormal relaxation and      increased filling pressure - grade 2 diastolic dysfunction.   2. Left atrium: The left atrial volume was mildly increased.   3. Aortic valve: Cusp separation was reduced. Transvalvular velocity was      increased, due to stenosis. The findings were consistent with severe      stenosis. There was mild regurgitation. The peak systolic velocity was      4.44m/sec. The mean systolic gradient was 46mm Hg. The valve area (VTI)      was 0.84cm^2. The valve area (VTI) index was 0.48cm^2/m^2.   4. Mitral valve: There was mild regurgitation.   5. Pulmonary arteries: Systolic pressure was within the normal range,      estimated to be 25mm Hg. Estimated pulmonary artery diastolic pressure      was 9mm Hg.   Impressions:  This study is compared with previous dated 05/23/2024: The   mean aortic valve gradient has increased from 40 to 46 mmHg.       Carotid US: 1/17/24:  No hemodynamically significant stenosis, with carotid velocities falling within the 0-50% stenosis range bilaterally, with the above listed velocities, according to NASCET criteria.       Assessment:  Severe, symptomatic aortic stenosis  Hypertension  Hyperlipidemia  PVD  OA -s/p recent bilateral knee injections    Plan:   - Complete PAT testing today, including EKG/labs/CXR   - Risks, benefits, alternatives explained previously to patient/family by TAVR team, consents obtained by myself.  Patient is amenable to surgical bailout during TAVR if clinically indicated.  This was also discussed with her son who is her POA and he is in agreement.  (See consents for full details)   - Complete TVT required KCCQ, 5 meter walk  -  Plan for SDA for TAVR 9/26, all questions answered to the best of this practitioners ability and to the patient's satisfaction      AMY De Leon  9/19/2024  10:49 AM

## 2024-09-23 ENCOUNTER — TELEPHONE (OUTPATIENT)
Dept: CARDIOLOGY CLINIC | Facility: HOSPITAL | Age: 89
End: 2024-09-23

## 2024-09-23 NOTE — DISCHARGE INSTRUCTIONS
Instructions after Transcatheter Aortic Valve Replacement (TAVR):    ACTIVITY:   Do not drive for 3 days after procedure, or as directed by your physician.  Do not lift anything heavier than 5-10bs for 1 week, or as directed by your physician.  Walk at least 3 times per day for 5-10 minutes at a time. Increase your activity gradually.  Keep legs elevated while sitting.   No tight-fitting underwear.     HYGIENE:  Wash incisions with mild soap and water daily. Showering is allowed. No tub baths or swimming pools until totally healed.  Do not apply lotions, ointments or creams to the incisions.   Avoid constipation or straining to have a bowel movement. If necessary, use Milk of Magnesia, Metamucil or other supplements to relieve straining.     NOTIFY YOUR PHYSICIAN IF:  Temperature is greater than 101 degrees, have chills, sweating or fever for more than one day.  Drainage, tenderness, redness, swelling, persistent pain from the incisions, or new numbness in the legs or feet.  Persistent chest discomfort or pain (angina) that radiates to the neck, jaw, or arm.   Nausea or profuse sweating.  Increasing shortness of breath with activity or at rest.  If your pain medication is not relieving your pain.    WOUND HEALING:  If you notice minor bleeding from the puncture wound, please do the following things. If it does not stop with these measures, please notify your doctor immediately:  Immediately lie flat.   Apply firm pressure just above the puncture site and hold firm pressure for 15 minutes. You may use a clean cloth to apply pressure. If possible, have another person apply the pressure.   After 15 minutes remove pressure. The wound should be dry and flat, without bleeding. You should continue to lie flat for about 1 hour before getting up and walking.   Cover the wound with a Band-Aid.     FOLLOW-UP CARE:  You will need a follow-up visit to check your incisions approximately 7 to 10 days after discharge, and will be  seen in your cardiologist office by the APN.  You will be seen in the valve clinic 1 month after discharge, if not given an appointment please call [phone] for [facility] patient's or [phone] for duly patients to make an appointment    IMPORTANT:  Always inform other doctors about your heart valve replacement before any medical or dental procedure.  Your dentist or physician should prescribe antibiotic prophylaxis prior to any invasive dental procedure or surgery.  Before undergoing MRI (magnetic resonance imaging), always notify the doctor (or medical technician) that you have an implanted heart valve.   If there are any changes in your condition, or you are hospitalized elsewhere, please notify the valve clinic.

## 2024-09-26 ENCOUNTER — ANESTHESIA (OUTPATIENT)
Dept: CARDIAC SURGERY | Facility: HOSPITAL | Age: 89
End: 2024-09-26
Payer: MEDICARE

## 2024-09-26 ENCOUNTER — HOSPITAL ENCOUNTER (OUTPATIENT)
Dept: CV DIAGNOSTICS | Facility: HOSPITAL | Age: 89
Discharge: HOME | End: 2024-09-26
Attending: INTERNAL MEDICINE
Payer: MEDICARE

## 2024-09-26 ENCOUNTER — APPOINTMENT (OUTPATIENT)
Dept: GENERAL RADIOLOGY | Facility: HOSPITAL | Age: 89
DRG: 267 | End: 2024-09-26
Attending: NURSE PRACTITIONER
Payer: MEDICARE

## 2024-09-26 ENCOUNTER — HOSPITAL ENCOUNTER (OUTPATIENT)
Dept: CV DIAGNOSTICS | Facility: HOSPITAL | Age: 89
Discharge: HOME | DRG: 267 | End: 2024-09-26
Attending: INTERNAL MEDICINE
Payer: MEDICARE

## 2024-09-26 ENCOUNTER — HOSPITAL ENCOUNTER (INPATIENT)
Facility: HOSPITAL | Age: 89
LOS: 1 days | Discharge: HOME | End: 2024-09-27
Attending: INTERNAL MEDICINE | Admitting: INTERNAL MEDICINE
Payer: MEDICARE

## 2024-09-26 ENCOUNTER — ANESTHESIA EVENT (OUTPATIENT)
Dept: CARDIAC SURGERY | Facility: HOSPITAL | Age: 89
End: 2024-09-26
Payer: MEDICARE

## 2024-09-26 ENCOUNTER — APPOINTMENT (OUTPATIENT)
Dept: GENERAL RADIOLOGY | Facility: HOSPITAL | Age: 89
End: 2024-09-26
Attending: NURSE PRACTITIONER
Payer: MEDICARE

## 2024-09-26 ENCOUNTER — HOSPITAL ENCOUNTER (INPATIENT)
Facility: HOSPITAL | Age: 89
LOS: 1 days | Discharge: HOME | DRG: 267 | End: 2024-09-27
Attending: INTERNAL MEDICINE | Admitting: INTERNAL MEDICINE
Payer: MEDICARE

## 2024-09-26 LAB
ALBUMIN SERPL-MCNC: 3.4 G/DL (ref 3.2–4.8)
ALBUMIN/GLOB SERPL: 1.6 {RATIO} (ref 1–2)
ALP LIVER SERPL-CCNC: 80 U/L
ALT SERPL-CCNC: 10 U/L
ANION GAP SERPL CALC-SCNC: 8 MMOL/L (ref 0–18)
APTT PPP: 59.1 SECONDS (ref 23–36)
AST SERPL-CCNC: 13 U/L (ref ?–34)
BASE EXCESS BLD CALC-SCNC: -6 MMOL/L
BILIRUB SERPL-MCNC: 0.7 MG/DL (ref 0.2–1.1)
BUN BLD-MCNC: 33 MG/DL (ref 9–23)
CA-I BLD-SCNC: 1.12 MMOL/L (ref 1.12–1.32)
CALCIUM BLD-MCNC: 8.3 MG/DL (ref 8.7–10.4)
CHLORIDE SERPL-SCNC: 112 MMOL/L (ref 98–112)
CO2 BLD-SCNC: 21 MMOL/L (ref 22–32)
CO2 SERPL-SCNC: 22 MMOL/L (ref 21–32)
CREAT BLD-MCNC: 0.96 MG/DL
EGFRCR SERPLBLD CKD-EPI 2021: 57 ML/MIN/1.73M2 (ref 60–?)
ERYTHROCYTE [DISTWIDTH] IN BLOOD BY AUTOMATED COUNT: 14.1 %
GLOBULIN PLAS-MCNC: 2.1 G/DL (ref 2–3.5)
GLUCOSE BLD-MCNC: 121 MG/DL (ref 70–99)
GLUCOSE BLD-MCNC: 143 MG/DL (ref 70–99)
GLUCOSE BLD-MCNC: 97 MG/DL (ref 70–99)
HCO3 BLD-SCNC: 19.7 MEQ/L
HCT VFR BLD AUTO: 28.5 %
HCT VFR BLD CALC: 27 %
HGB BLD-MCNC: 9.5 G/DL
INR BLD: 1.16 (ref 0.8–1.2)
ISTAT ACTIVATED CLOTTING TIME: 318 SECONDS (ref 74–137)
MAGNESIUM SERPL-MCNC: 1.7 MG/DL (ref 1.6–2.6)
MCH RBC QN AUTO: 29.3 PG (ref 26–34)
MCHC RBC AUTO-ENTMCNC: 33.3 G/DL (ref 31–37)
MCV RBC AUTO: 88 FL
MRSA DNA SPEC QL NAA+PROBE: NEGATIVE
OSMOLALITY SERPL CALC.SUM OF ELEC: 303 MOSM/KG (ref 275–295)
PCO2 BLD: 35.3 MMHG
PH BLD: 7.36 [PH]
PLATELET # BLD AUTO: 135 10(3)UL (ref 150–450)
PO2 BLD: 378 MMHG
POTASSIUM BLD-SCNC: 3.3 MMOL/L (ref 3.6–5.1)
POTASSIUM SERPL-SCNC: 3.2 MMOL/L (ref 3.5–5.1)
PROT SERPL-MCNC: 5.5 G/DL (ref 5.7–8.2)
PROTHROMBIN TIME: 14.8 SECONDS (ref 11.6–14.8)
RBC # BLD AUTO: 3.24 X10(6)UL
RH BLOOD TYPE: POSITIVE
SAO2 % BLD: 100 %
SODIUM BLD-SCNC: 144 MMOL/L (ref 136–145)
SODIUM SERPL-SCNC: 142 MMOL/L (ref 136–145)
WBC # BLD AUTO: 14.6 X10(3) UL (ref 4–11)

## 2024-09-26 PROCEDURE — 82330 ASSAY OF CALCIUM: CPT

## 2024-09-26 PROCEDURE — 93005 ELECTROCARDIOGRAM TRACING: CPT

## 2024-09-26 PROCEDURE — B310YZZ FLUOROSCOPY OF THORACIC AORTA USING OTHER CONTRAST: ICD-10-PCS | Performed by: INTERNAL MEDICINE

## 2024-09-26 PROCEDURE — 82803 BLOOD GASES ANY COMBINATION: CPT

## 2024-09-26 PROCEDURE — 71045 X-RAY EXAM CHEST 1 VIEW: CPT | Performed by: NURSE PRACTITIONER

## 2024-09-26 PROCEDURE — 80053 COMPREHEN METABOLIC PANEL: CPT | Performed by: NURSE PRACTITIONER

## 2024-09-26 PROCEDURE — 85347 COAGULATION TIME ACTIVATED: CPT

## 2024-09-26 PROCEDURE — 85610 PROTHROMBIN TIME: CPT | Performed by: NURSE PRACTITIONER

## 2024-09-26 PROCEDURE — 85014 HEMATOCRIT: CPT

## 2024-09-26 PROCEDURE — 83735 ASSAY OF MAGNESIUM: CPT | Performed by: NURSE PRACTITIONER

## 2024-09-26 PROCEDURE — B24BZZ4 ULTRASONOGRAPHY OF HEART WITH AORTA, TRANSESOPHAGEAL: ICD-10-PCS | Performed by: INTERNAL MEDICINE

## 2024-09-26 PROCEDURE — 02RF38Z REPLACEMENT OF AORTIC VALVE WITH ZOOPLASTIC TISSUE, PERCUTANEOUS APPROACH: ICD-10-PCS | Performed by: INTERNAL MEDICINE

## 2024-09-26 PROCEDURE — 85027 COMPLETE CBC AUTOMATED: CPT | Performed by: NURSE PRACTITIONER

## 2024-09-26 PROCEDURE — 82962 GLUCOSE BLOOD TEST: CPT

## 2024-09-26 PROCEDURE — S0028 INJECTION, FAMOTIDINE, 20 MG: HCPCS | Performed by: NURSE PRACTITIONER

## 2024-09-26 PROCEDURE — 84132 ASSAY OF SERUM POTASSIUM: CPT

## 2024-09-26 PROCEDURE — 93312 ECHO TRANSESOPHAGEAL: CPT | Performed by: INTERNAL MEDICINE

## 2024-09-26 PROCEDURE — 93010 ELECTROCARDIOGRAM REPORT: CPT | Performed by: INTERNAL MEDICINE

## 2024-09-26 PROCEDURE — 84295 ASSAY OF SERUM SODIUM: CPT

## 2024-09-26 PROCEDURE — 93355 ECHO TRANSESOPHAGEAL (TEE): CPT | Performed by: INTERNAL MEDICINE

## 2024-09-26 PROCEDURE — 87641 MR-STAPH DNA AMP PROBE: CPT | Performed by: INTERNAL MEDICINE

## 2024-09-26 PROCEDURE — 85730 THROMBOPLASTIN TIME PARTIAL: CPT | Performed by: NURSE PRACTITIONER

## 2024-09-26 DEVICE — SAPIEN 3 ULTRA RESILIA TRANSCATHETER HEART VALVE, 23MM
Type: IMPLANTABLE DEVICE | Status: FUNCTIONAL
Brand: SAPIEN 3 ULTRA RESILIA

## 2024-09-26 RX ORDER — HYDROMORPHONE HYDROCHLORIDE 1 MG/ML
0.4 INJECTION, SOLUTION INTRAMUSCULAR; INTRAVENOUS; SUBCUTANEOUS EVERY 5 MIN PRN
Status: DISPENSED | OUTPATIENT
Start: 2024-09-26 | End: 2024-09-26

## 2024-09-26 RX ORDER — SODIUM CHLORIDE 9 MG/ML
INJECTION, SOLUTION INTRAVENOUS CONTINUOUS
Status: DISCONTINUED | OUTPATIENT
Start: 2024-09-26 | End: 2024-09-27

## 2024-09-26 RX ORDER — SENNOSIDES 8.6 MG
17.2 TABLET ORAL NIGHTLY PRN
Status: DISCONTINUED | OUTPATIENT
Start: 2024-09-26 | End: 2024-09-27

## 2024-09-26 RX ORDER — IODIXANOL 320 MG/ML
100 INJECTION, SOLUTION INTRAVASCULAR
Status: DISCONTINUED | OUTPATIENT
Start: 2024-09-26 | End: 2024-09-27

## 2024-09-26 RX ORDER — BISACODYL 10 MG
10 SUPPOSITORY, RECTAL RECTAL
Status: DISCONTINUED | OUTPATIENT
Start: 2024-09-26 | End: 2024-09-27

## 2024-09-26 RX ORDER — HYDROCODONE BITARTRATE AND ACETAMINOPHEN 5; 325 MG/1; MG/1
1 TABLET ORAL EVERY 6 HOURS PRN
Status: DISCONTINUED | OUTPATIENT
Start: 2024-09-26 | End: 2024-09-27

## 2024-09-26 RX ORDER — MAGNESIUM OXIDE 400 MG/1
400 TABLET ORAL ONCE
Status: COMPLETED | OUTPATIENT
Start: 2024-09-26 | End: 2024-09-26

## 2024-09-26 RX ORDER — LISINOPRIL 2.5 MG/1
2.5 TABLET ORAL DAILY
Status: DISCONTINUED | OUTPATIENT
Start: 2024-09-26 | End: 2024-09-27

## 2024-09-26 RX ORDER — ONDANSETRON 2 MG/ML
INJECTION INTRAMUSCULAR; INTRAVENOUS AS NEEDED
Status: DISCONTINUED | OUTPATIENT
Start: 2024-09-26 | End: 2024-09-26 | Stop reason: SURG

## 2024-09-26 RX ORDER — LIDOCAINE HYDROCHLORIDE 10 MG/ML
INJECTION, SOLUTION EPIDURAL; INFILTRATION; INTRACAUDAL; PERINEURAL AS NEEDED
Status: DISCONTINUED | OUTPATIENT
Start: 2024-09-26 | End: 2024-09-26 | Stop reason: SURG

## 2024-09-26 RX ORDER — PHENYLEPHRINE HCL 10 MG/ML
VIAL (ML) INJECTION AS NEEDED
Status: DISCONTINUED | OUTPATIENT
Start: 2024-09-26 | End: 2024-09-26 | Stop reason: SURG

## 2024-09-26 RX ORDER — HYDRALAZINE HYDROCHLORIDE 20 MG/ML
10 INJECTION INTRAMUSCULAR; INTRAVENOUS EVERY 2 HOUR PRN
Status: DISCONTINUED | OUTPATIENT
Start: 2024-09-26 | End: 2024-09-27

## 2024-09-26 RX ORDER — HEPARIN SODIUM 1000 [USP'U]/ML
INJECTION, SOLUTION INTRAVENOUS; SUBCUTANEOUS AS NEEDED
Status: DISCONTINUED | OUTPATIENT
Start: 2024-09-26 | End: 2024-09-26 | Stop reason: SURG

## 2024-09-26 RX ORDER — ALBUMIN, HUMAN INJ 5% 5 %
12.5 SOLUTION INTRAVENOUS ONCE AS NEEDED
Status: ACTIVE | OUTPATIENT
Start: 2024-09-26 | End: 2024-09-26

## 2024-09-26 RX ORDER — FAMOTIDINE 20 MG/1
20 TABLET, FILM COATED ORAL 2 TIMES DAILY
Status: DISCONTINUED | OUTPATIENT
Start: 2024-09-26 | End: 2024-09-26

## 2024-09-26 RX ORDER — ONDANSETRON 2 MG/ML
4 INJECTION INTRAMUSCULAR; INTRAVENOUS EVERY 6 HOURS PRN
Status: DISCONTINUED | OUTPATIENT
Start: 2024-09-26 | End: 2024-09-27

## 2024-09-26 RX ORDER — ACETAMINOPHEN 325 MG/1
650 TABLET ORAL EVERY 6 HOURS PRN
Status: DISCONTINUED | OUTPATIENT
Start: 2024-09-26 | End: 2024-09-27

## 2024-09-26 RX ORDER — ROCURONIUM BROMIDE 10 MG/ML
INJECTION, SOLUTION INTRAVENOUS AS NEEDED
Status: DISCONTINUED | OUTPATIENT
Start: 2024-09-26 | End: 2024-09-26 | Stop reason: SURG

## 2024-09-26 RX ORDER — PROTAMINE SULFATE 10 MG/ML
INJECTION, SOLUTION INTRAVENOUS AS NEEDED
Status: DISCONTINUED | OUTPATIENT
Start: 2024-09-26 | End: 2024-09-26 | Stop reason: SURG

## 2024-09-26 RX ORDER — NITROGLYCERIN 20 MG/100ML
INJECTION INTRAVENOUS CONTINUOUS PRN
Status: DISCONTINUED | OUTPATIENT
Start: 2024-09-26 | End: 2024-09-27

## 2024-09-26 RX ORDER — HYDROCHLOROTHIAZIDE 25 MG/1
50 TABLET ORAL DAILY
Status: DISCONTINUED | OUTPATIENT
Start: 2024-09-26 | End: 2024-09-27

## 2024-09-26 RX ORDER — HYDROCODONE BITARTRATE AND ACETAMINOPHEN 5; 325 MG/1; MG/1
2 TABLET ORAL EVERY 6 HOURS PRN
Status: DISCONTINUED | OUTPATIENT
Start: 2024-09-26 | End: 2024-09-27

## 2024-09-26 RX ORDER — SODIUM CHLORIDE, SODIUM LACTATE, POTASSIUM CHLORIDE, CALCIUM CHLORIDE 600; 310; 30; 20 MG/100ML; MG/100ML; MG/100ML; MG/100ML
INJECTION, SOLUTION INTRAVENOUS CONTINUOUS
Status: DISCONTINUED | OUTPATIENT
Start: 2024-09-26 | End: 2024-09-27

## 2024-09-26 RX ORDER — NALOXONE HYDROCHLORIDE 0.4 MG/ML
0.08 INJECTION, SOLUTION INTRAMUSCULAR; INTRAVENOUS; SUBCUTANEOUS AS NEEDED
Status: ACTIVE | OUTPATIENT
Start: 2024-09-26 | End: 2024-09-26

## 2024-09-26 RX ORDER — HYDRALAZINE HYDROCHLORIDE 20 MG/ML
10 INJECTION INTRAMUSCULAR; INTRAVENOUS
Status: DISCONTINUED | OUTPATIENT
Start: 2024-09-26 | End: 2024-09-27

## 2024-09-26 RX ORDER — DEXAMETHASONE SODIUM PHOSPHATE 4 MG/ML
VIAL (ML) INJECTION AS NEEDED
Status: DISCONTINUED | OUTPATIENT
Start: 2024-09-26 | End: 2024-09-26 | Stop reason: SURG

## 2024-09-26 RX ORDER — POLYETHYLENE GLYCOL 3350 17 G/17G
17 POWDER, FOR SOLUTION ORAL DAILY PRN
Status: DISCONTINUED | OUTPATIENT
Start: 2024-09-26 | End: 2024-09-27

## 2024-09-26 RX ORDER — FAMOTIDINE 10 MG/ML
20 INJECTION, SOLUTION INTRAVENOUS DAILY
Status: DISCONTINUED | OUTPATIENT
Start: 2024-09-27 | End: 2024-09-27

## 2024-09-26 RX ORDER — FAMOTIDINE 20 MG/1
20 TABLET, FILM COATED ORAL DAILY
Status: DISCONTINUED | OUTPATIENT
Start: 2024-09-27 | End: 2024-09-27

## 2024-09-26 RX ORDER — HYDROMORPHONE HYDROCHLORIDE 1 MG/ML
0.2 INJECTION, SOLUTION INTRAMUSCULAR; INTRAVENOUS; SUBCUTANEOUS EVERY 5 MIN PRN
Status: ACTIVE | OUTPATIENT
Start: 2024-09-26 | End: 2024-09-26

## 2024-09-26 RX ORDER — FAMOTIDINE 10 MG/ML
20 INJECTION, SOLUTION INTRAVENOUS 2 TIMES DAILY
Status: DISCONTINUED | OUTPATIENT
Start: 2024-09-26 | End: 2024-09-26

## 2024-09-26 RX ORDER — HYDROMORPHONE HYDROCHLORIDE 1 MG/ML
0.6 INJECTION, SOLUTION INTRAMUSCULAR; INTRAVENOUS; SUBCUTANEOUS EVERY 5 MIN PRN
Status: ACTIVE | OUTPATIENT
Start: 2024-09-26 | End: 2024-09-26

## 2024-09-26 RX ORDER — SODIUM PHOSPHATE, DIBASIC AND SODIUM PHOSPHATE, MONOBASIC 7; 19 G/230ML; G/230ML
1 ENEMA RECTAL ONCE AS NEEDED
Status: DISCONTINUED | OUTPATIENT
Start: 2024-09-26 | End: 2024-09-27

## 2024-09-26 RX ORDER — LABETALOL HYDROCHLORIDE 5 MG/ML
INJECTION, SOLUTION INTRAVENOUS AS NEEDED
Status: DISCONTINUED | OUTPATIENT
Start: 2024-09-26 | End: 2024-09-26 | Stop reason: SURG

## 2024-09-26 RX ORDER — METOCLOPRAMIDE HYDROCHLORIDE 5 MG/ML
5 INJECTION INTRAMUSCULAR; INTRAVENOUS EVERY 8 HOURS PRN
Status: DISCONTINUED | OUTPATIENT
Start: 2024-09-26 | End: 2024-09-27

## 2024-09-26 RX ORDER — ATORVASTATIN CALCIUM 40 MG/1
40 TABLET, FILM COATED ORAL NIGHTLY
Status: DISCONTINUED | OUTPATIENT
Start: 2024-09-26 | End: 2024-09-27

## 2024-09-26 RX ADMIN — PROTAMINE SULFATE 50 MG: 10 INJECTION, SOLUTION INTRAVENOUS at 08:43:00

## 2024-09-26 RX ADMIN — SODIUM CHLORIDE: 9 INJECTION, SOLUTION INTRAVENOUS at 07:25:00

## 2024-09-26 RX ADMIN — PHENYLEPHRINE HCL 100 MCG: 10 MG/ML VIAL (ML) INJECTION at 08:31:00

## 2024-09-26 RX ADMIN — ROCURONIUM BROMIDE 70 MG: 10 INJECTION, SOLUTION INTRAVENOUS at 07:36:00

## 2024-09-26 RX ADMIN — DEXAMETHASONE SODIUM PHOSPHATE 4 MG: 4 MG/ML VIAL (ML) INJECTION at 07:45:00

## 2024-09-26 RX ADMIN — HEPARIN SODIUM 15000 UNITS: 1000 INJECTION, SOLUTION INTRAVENOUS; SUBCUTANEOUS at 08:13:00

## 2024-09-26 RX ADMIN — ONDANSETRON 4 MG: 2 INJECTION INTRAMUSCULAR; INTRAVENOUS at 08:43:00

## 2024-09-26 RX ADMIN — LABETALOL HYDROCHLORIDE 5 MG: 5 INJECTION, SOLUTION INTRAVENOUS at 08:37:00

## 2024-09-26 RX ADMIN — LIDOCAINE HYDROCHLORIDE 50 MG: 10 INJECTION, SOLUTION EPIDURAL; INFILTRATION; INTRACAUDAL; PERINEURAL at 07:34:00

## 2024-09-26 NOTE — ANESTHESIA PROCEDURE NOTES
Arterial Line    Date/Time: 9/26/2024 7:36 AM    Performed by: Chapincito Xie MD  Authorized by: Chapincito Xie MD    General Information and Staff    Procedure Start:  9/26/2024 7:36 AM  Procedure End:  9/26/2024 7:36 AM  Anesthesiologist:  Chapincito Xie MD  Performed By:  Anesthesiologist  Patient Location:  OR  Indication: continuous blood pressure monitoring and blood sampling needed    Site Identification: real time ultrasound guided and image stored and retrievable    Preanesthetic Checklist: 2 patient identifiers, IV checked, risks and benefits discussed, monitors and equipment checked, pre-op evaluation, timeout performed, anesthesia consent and sterile technique used    Procedure Details    Catheter Size:  20 G  Catheter Length:  1 and 3/4 inch  Catheter Type:  Arrow  Seldinger Technique?: Yes    Laterality:  Left  Site:  Radial artery  Site Prep: chlorhexidine    Line Secured:  Wrist Brace, tape and Tegaderm    Assessment    Events: patient tolerated procedure well with no complications      Medications  9/26/2024 7:36 AM      Additional Comments

## 2024-09-26 NOTE — SPIRITUAL CARE NOTE
Spiritual Care Visit Note    Patient Name: Jinny Ocampo Date of Spiritual Care Visit: 24   : 1935 Primary Dx: Aortic stenosis       Referred By:      Spiritual Care Taxonomy:    Intended Effects: Build relationship of care and support    Methods: Offer emotional support;Offer spiritual/Alevism support    Interventions: Active listening;Acknowledge current situation;Explain  role;Share words of hope and inspiration    Visit Type/Summary:     - Spiritual Care: Consulted with RN prior to visit. Offered empathic listening and emotional support. Patient and family expressed appreciation for  visit. Provided information regarding how to contact Spiritual Care and left a Spiritual Care information card.  remains available as needed for follow up.    Spiritual Care support can be requested via an Epic consult. For urgent/immediate needs, please contact the On Call  at: [facility]: ext [phone]    Shanell Bashir MA

## 2024-09-26 NOTE — OPERATIVE REPORT
OPERATIVE REPORT    PATIENT'S NAME: Jinny Ocampo  DATE: 2024  : 1935  CSN: [id number]     PREOPERATIVE DIAGNOSIS:    Severe symptomatic aortic stenosis.  Arthritis  Hyperlipidemia  Hypertension   POSTOPERATIVE DIAGNOSIS:  Same  PROCEDURE PERFORMED:  Transcatheter aortic valve replacement with 23 mm Mcnamara Chelsy 3 Ultra Resilia valve     OPERATORS:  Jose De Jesus Ortiz MD, Lukas Mccabe MD, and Ryan Beckman MD; Maximo Molina MD  SURGEON: Noah Kemp MD    INDICATION: Jinny Ocampo is a 89 year old female with symptomatic severe aortic stenosis who has been reviewed by our multidisciplinary TAVR committee and deemed suitable for placement of a transcatheter aortic valve replacement.      DESCRIPTION OF PROCEDURE:  After informed consent was obtained, the patient was prepped and draped in the usual sterile fashion.  The patient underwent general anesthesia.  Bilateral common femoral arteries were accessed using a micropuncture needle and sheath. A 14-Guyanese sheath was placed on the Right side.  Heparin was given.  A pigtail catheter was advanced to the aortic root to obtain images of the annulus. A 23 mm Mcnamara Chelsy 3 Ultra Resilia valve was chosen and prepped accordingly with standard contrast.  The aortic valve was crossed. The valve was brought into the abdominal aorta and paired with its balloon.  It was brought across the aortic annulus and deployed with rapid pacing.  Followup ANTIONE interrogation revealed no significant aortic insufficiency.  The delivery device was removed.  The Right side was closed using Perclose devices and a Perclose device was used on the Left side to achieve hemostasis.    Patient tolerate the procedure without difficulty.       Noah Kemp MD  [facility]

## 2024-09-26 NOTE — HISTORICAL OFFICE NOTE
[facility]     PATIENT'S NAME: NANCY TAYOLR   ATTENDING PHYSICIAN: Ryan Beckman M.D.   CONSULTING PHYSICIAN: Ryan Beckman M.D.   PATIENT ACCOUNT#:   [id number]    LOCATION:  [facility]  MEDICAL RECORD #:   [id number]       YOB: 1935  ADMISSION DATE:       05/23/2024      CONSULT DATE:  05/23/2024     REPORT OF CONSULTATION     HISTORY OF PRESENT ILLNESS:  I am seeing the patient today in the Structural Heart Clinic for a repeat discussion regarding the possibility of percutaneous transcatheter aortic valve replacement.     We saw each other in early February after she was referred to the valve clinic by Dr. Harriet Saini.  Her history was significant for a known murmur that had been followed with clinical examination and echocardiographic imaging.  It had progressed to the severe range echocardiographically with a peak velocity of 4.1 m/second and a mean gradient of 39 mmHg.     At our initial visit, we discussed the various options for how to proceed, and we decided to wait 3 months and reassess the situation given the fact she is essentially asymptomatic.     Over these last 3 months, she has been clinically stable.  She still does not notice any limitations in her day-to-day activity.  She has had no chest pain, syncope or congestive heart failure symptomatology.     Her main limitation remains degenerative arthritis especially involving her knees.     PHYSICAL EXAMINATION:    GENERAL:  She looks very healthy and is cognitively intact.    LUNGS:  Clear.    HEART:  She has an obvious aortic stenotic murmur.  No significant aortic insufficiency.    ABDOMEN:  Soft.    EXTREMITIES:  She does not have significant lower extremity edema.  She does have some underlying varicosities.     After our last visit, she moved forward with her CT imaging.  Calcium score was 72 with no suggestion of significant coronary obstruction based on her CT angiogram.  There is no evidence of ascending aortic  aneurysm.  Peripheral vasculature would accommodate a percutaneous valve.     ASSESSMENT AND PLAN:  We had another nice discussion today.  Her son, Deshawn, was here again.  They asked some very good questions.     I have explained to them that in my opinion this becomes a philosophical approach to how she would like to address her care.  I have explained to her that in my personal opinion she remains independent and asymptomatic, and I would prefer seeing her again in 3 months to reassess her clinical status.     I have also explained to her that there is no right answer for how to proceed.  I have explained that there is a risk of sudden death and heart failure.  I have also explained that there is risk of stroke and death and the need for pacemaker placement and a potential multitude of other complications moving forward with intervention.  She herself has been able to give it some thought, and it sounds as if she remains comfortable in following this clinically and being conservative at the moment from an interventional standpoint.     I will ask that she return to see us again in early September and that she have a followup echocardiogram performed just before that visit.     She will be seeing Dr. Harriet Saini in June.     We reviewed signs and symptoms that would potentially indicate needing to move forward with intervention sooner.  She is to let the clinic know if any of these occur.     I think she and her son have a nice understanding and seem comfortable with the above approach     Dictated By Ryan Beckman M.D.  d:05/23/2024 13:31:55  t:05/23/2024 14:00:34  [id number]  WS/            Last signed by: Ryan Beckman MD at 5/24/2024  8:11 AM   Electronically signed by Ryan Beckman MD at 5/24/2024  8:11 AM

## 2024-09-26 NOTE — H&P
History & Physical Examination    Patient Name: Jinny Ocampo  MRN: [id number]  CSN: [id number]  YOB: 1935    Diagnosis: Severe, symptomatic aortic stenosis    Present Illness: Patient presents for elective admission for TAVR today. She has previously been evaluate by the Structural Heart Team and Cardiovascular surgery and deemed a TAVR candidate. Please see full structural heart consult performed by Dr Beckman on 5/23/24. PAT was performed on 9/19/24. No changes since that visit.     No medications prior to admission.     No current facility-administered medications for this encounter.       Current Outpatient Medications   Medication Instructions    acetaminophen (TYLENOL EXTRA STRENGTH) 500 mg, Oral    ATORVASTATIN 40 MG Oral Tab TAKE 1 TABLET(40 MG) BY MOUTH EVERY NIGHT    hydroCHLOROthiazide 50 mg, Oral, Daily    ibuprofen (MOTRIN) 200 mg, Oral, Every 6 hours PRN    latanoprost 0.005 % Ophthalmic Solution 1 drop, Nightly, AT BEDTIME    lisinopril (PRINIVIL; ZESTRIL) 2.5 mg, Oral, Daily        Allergies:   Allergies   Allergen Reactions    Amlodipine SWELLING     And constipation    Amoxicillin SWELLING     Swelling of tongue    Antihistamines, Loratadine-Type OTHER (SEE COMMENTS)     Cotton head sensation    Codeine OTHER (SEE COMMENTS)     Derivatives-stomach upset      Demerol      TABS-stomach upset      Lisinopril OTHER (SEE COMMENTS)     Tickle in throat    Zetia [Ezetimibe] DIARRHEA     TABS       Past Medical History:    Aortic valve calcification    UFCT    Arthritis    right arm    Atypical ductal hyperplasia of left breast    Cataracts, bilateral    small    Fibrocystic breast changes of both breasts    Other and unspecified hyperlipidemia    Plantar fascial fibromatosis    PONV (postoperative nausea and vomiting)    Unspecified essential hypertension     Past Surgical History:   Procedure Laterality Date    Biopsy of breast, incisional  01/01/2007    right-negative    Breast biopsy   07/22/2015    left breast biopsy.     Femur/knee surg unlisted  01/01/1985    Right knee, cartilage    Jenn biopsy stereo nodule 1 site right (cpt=19081)  02/2017    benign-hyalinized duct    Jenn biopsy stereo nodule 2 site bilat (cpt=19081/78497)  06/2015    atypia    Jenn localization wire 1 site left (cpt=19281)  07/2015    atypia    Needle biopsy right  2008    benign.    Oct, retina - os - left eye      Punc/aspir breast cyst  01/01/2008     No family history on file.  Social History     Tobacco Use    Smoking status: Never    Smokeless tobacco: Never   Substance Use Topics    Alcohol use: Yes     Comment: socially only on Holidays       SYSTEM Check if Review is Normal Check if Physical Exam is Normal If not normal, please explain:   HEENT [ x] [ x]    NECK & BACK [ x] [ x]    HEART [ x] [ ] 3/6 jaspreet   LUNGS [ x] [ x]    ABDOMEN [x ] [ x]    UROGENITAL [ ] [ ] deferred   EXTREMITIES [ x] [ x]    OTHER        [ x ] I have discussed the risks and benefits and alternatives with the patient/family.  They understand and agree to proceed with plan of care.  [ x ] I have reviewed the History and Physical done within the last 30 days.  Any changes noted above.    Thea Victor, AMY  9/26/2024  6:25 AM      Reviewed again this am with entire structural heart team    Will proceed with an attempt at TAVR today as planned    CXR clear    ECG sinus bradycardia    40/1.1    Pro-bnp 991    Hgb 10.6   plts 214

## 2024-09-26 NOTE — PROGRESS NOTES
Received pt from [facility] ~0900. Groin sites C/D/I, soft and no hematomas. Pulses palpable. Diet advanced as ursula. C/O intermittent dizziness, subsided once pt able to sit up in bed. NSR on monitor with short runs of Afib. C/O pain managed c PRN pain meds. Family @ bedside and updated on POC

## 2024-09-26 NOTE — ANESTHESIA PROCEDURE NOTES
Procedure Performed: ANTIONE       Start Time:  9/26/2024 7:51 AM       End Time:   9/26/2024 7:51 AM    Preanesthesia Checklist:  Patient identified, IV assessed, risks and benefits discussed, monitors and equipment assessed, procedure being performed at surgeon's request and anesthesia consent obtained.    General Procedure Information  Diagnostic Indications for Echo:  assessment of surgical repair  Physician Requesting Echo: Lukas Lambert MD  Location performed:  OR  Intubated  Bite block placed  Heart visualized  Probe Insertion:  Easy  Probe Type:  Multiplane  Modalities:  2D only, color flow mapping, pulse wave Doppler and continuous wave Doppler        Anesthesia Information  Performed Personally  Anesthesiologist:  Chapincito Xie MD      Post                       Summary: ANTIONE placement only, see cardiology report  Complications:None

## 2024-09-26 NOTE — ANESTHESIA PROCEDURE NOTES
Airway  Date/Time: 9/26/2024 7:38 AM  Urgency: elective      General Information and Staff    Patient location during procedure: OR  Anesthesiologist: Chapincito Xie MD  Performed: anesthesiologist   Performed by: Chapincito Xie MD  Authorized by: Chapincito Xie MD      Indications and Patient Condition  Indications for airway management: anesthesia  Sedation level: deep  Preoxygenated: yes  Patient position: sniffing  Mask difficulty assessment: 1 - vent by mask    Final Airway Details  Final airway type: endotracheal airway      Successful airway: ETT  Cuffed: yes   Successful intubation technique: direct laryngoscopy  Endotracheal tube insertion site: oral  Blade size: #3  ETT size (mm): 7.0    Placement verified by: capnometry   Cuff volume (mL): 8  Measured from: lips  ETT to lips (cm): 22  Number of attempts at approach: 1    Additional Comments  atraumatic

## 2024-09-26 NOTE — HISTORICAL OFFICE NOTE
[facility]  [address] [place] [address]  [phone]      Jinny Ocampo  Progress Note  Demographics:  Name: Jinny Ocampo YOB: 1935  Age: 89, Female Medical Record No: [id number]  Visited Date/Time: 06/24/2024 11:00 AM    Chief Complaints  6 mo FU   Review recent labs and echo   Med change @ last OV  History of Present Illness  Ms. Ocampo is a pleasant 89-year-old lady with a history of dyslipidemia and hypertension.  Her A1c is 6.5.    She has minimal carotid disease and normal left ventricular function and what appears to be progressive aortic stenosis. Cardiac stress testing (2017) has been normal and she is doing well. She denies any  angina or heart failure symptoms.  She had wanted to hold off on any further stress testing or cardiac testing at this point in time but has changed her mind now that she is having symptoms with progressive AS.  She did see Dr. Mccabe for moderate infrageniculate peripheral  vascular disease.  He recommended diet, exercise and  risk factor modification and no need for any intervention at this time.  She currently is doing well.    I talked with Dr. Beckman and we will be sending her to structural heart clinic to be evaluated for TAVR.    She has mild carotid stenosis.  She has mild coronary artery disease.  Coronary calcium score is 72.    Her next follow-up blood work will be in 6 months including a CBC, CMP, fasting lipid panel, TSH, proBNP, hemoglobin A1c.    She is following up with Dr. Beckman in the structural heart clinic.  Her next ultrasound of the heart will be in late August.    She largely remains asymptomatic but he has some occasional fatigue.  When temperatures were very very high, near 100 degrees, she did not feel well,  but I think anyone would feel bad with that high temperature and humidity outside.  Cardiac risk factors Hypertension, Dyslipidemia, Obesity and Never smoked  Past Medical  History  1.Asymptomatic carotid artery stenosis, bilateral  2.Nonrheumatic aortic valve stenosis  3.Hypertension, benign  4.Abnormal EKG  5.Carotid bruit  6.Pure hypercholesterolemia  Family History  No significant family history noted.  Social History  Smoking status Never smoked  Tobacco usage - No (Non-smoker (finding))  Alcohol usage - Yes (\"wine on holidays\" )  Review of systems  Cardiovascular Edema  No history of Chest pain, TOLEDO, Palpitations, Syncope, PND, Orthopnea and Claudication  Physical Examination  Vitals Right Arm Sitting  / 72 mmHg, Pulse rate 58 bpm, Regular, Height in 5' 4\", BMI: 28.5, Weight in 166 lbs (or) 75.3 kgs and BSA : 1.87 cc/m²  General Appearance No Acute Distress and Appropriate  Head/Eyes/Ears/Nose/Mouth/Throat Conjunctiva pink, Sclera Clear, Mucous membranes Moist and No pallor  Neck No carotid bruits and No JVD  Respiratory Lungs clear with normal breath sounds  Cardiovascular Intact distal pulses and Regular rhythm. Normal rate present. Normal and normal S1 and S2    Gastrointestinal Abdomen soft and Non-tender  Musculoskeletal Normal spine  Gait Normal gait  Strength and tone Normal muscle strength and Normal muscle tone  Upper Extremities No edema  Lower Extremities Pulses 2+ and equal bilaterally and No edema  Skin Warm and dry  Neurologic / Psychiatric Alert and Oriented  Speech Normal speech  Allergies  1.Codeine(Reaction:, Severity:Mild)  2.DemeroL - Drug Class(Reaction: nausea, Severity:Mild)  3.Ezetimibe(Reaction:, Severity:Mild)  4.Lisinopril(Reaction:, Severity:Mild)  Medications (Info obtained by: Verbal)  1.atorvastatin (LIPITOR) 40 MG tablet, take one tablet daily  2.hydroCHLOROthiazide 50 mg tablet, Take 1 tablet orally once a day.  3.ibuprofen 200 mg tablet, Take 1 tablet orally 2 times a day as needed.  4.potassium chloride ER 20 mEq tablet,extended release, Take 1 tablet orally once a day.  Impression  1.Edema, localized  2.TOLEDO (dyspnea on  exertion)  3.Hypokalemia  4.Fatigue  5.PAD right leg - (atherosclerosis native extremities)  6.Asymptomatic carotid artery stenosis, bilateral  7.Edema, localized  8.Nonrheumatic aortic valve stenosis  9.Hypertension, benign  10.Abnormal EKG  11.Carotid bruit  12.Pure hypercholesterolemia  Assessment & Plan  Ms. Ocampo is a pleasant 89-year-old lady with a history of dyslipidemia and hypertension.  Her A1c is 6.5.    She has minimal carotid disease and normal left ventricular function and what appears to be progressive aortic stenosis. Cardiac stress testing (2017) has been normal and she is doing well. She denies any  angina or heart failure symptoms.  She had wanted to hold off on any further stress testing or cardiac testing at this point in time but has changed her mind now that she is having symptoms with progressive AS.  She did see Dr. Mccabe for moderate infrageniculate peripheral  vascular disease.  He recommended diet, exercise and  risk factor modification and no need for any intervention at this time.  She currently is doing well.    I talked with Dr. Beckman and we will be sending her to structural heart clinic to be evaluated for TAVR.    She has mild carotid stenosis.  She has mild coronary artery disease.  Coronary calcium score is 72.    Her next follow-up blood work will be in 6 months including a CBC, CMP, fasting lipid panel, TSH, proBNP, hemoglobin A1c.    She is following up with Dr. Beckman in the structural heart clinic.  Her next ultrasound of the heart will be in late August.    She largely remains asymptomatic but he has some occasional fatigue.  When temperatures were very very high, near 100 degrees, she did not feel well,  but I think anyone would feel bad with that high temperature and humidity outside.    Her blood pressure was mildly elevated and we will be adding lisinopril low-dose.  She wanted the lowest dose they can prescribe so we will give 2.5 mg of lisinopril.  She will  check blood pressures at the house and please give us a call in 2 weeks.  If she wishes she could see one of our advanced practice nurses in a video visit or she can come see them in 2 weeks.  Up to her.    Would continue to recommend a low fat, low cholesterol diet as well as a regular exercise program, as they are able to tolerate, and will follow this very nice person in office. Please return to clinic to see me in 6 months.  Labs and Diagnostics ordered  1.CBC w/ Differential (6 Months)  2.CMP (6 Months)  3.Hemoglobin A1c (6 Months)  4.Lipid panel, Fasting (6 Months)  5.NT - ProBNP (6 Months)  6.TSH (Thyroid Stimulating Hormone) Panel (6 Months)  Future appointments  1.Referral Visit - He Wilkins ([email or web address]) : (Today)  2.Follow up visit - Harriet Saini MD (6 Months)  Miscellaneous  1.Weight monitoring (regime/therapy)  Nurses documentation  Upcoming surgeries: None  Assistive devices: Cane   Refills: None  EKG: No   (MS, CMA)   Patient instructions  1.) New Medication:   Your provider has ordered you to start, Lisinopril 2.5 mg daily. A new prescription has been sent to your pharmacy. Please call with questions, concerns or side effects. Please call out office at [phone] in the event that you need assistance.    2.) Labs Fasting:   Your provider has ordered blood work to be completed in 6 months , prior to your follow up appointment.  You will need to be fasting, nothing to eat or drink for 8-10 hours  prior to having your  blood drawn . You are allowed to take your medications and drink water only.     3.) Check blood pressures at the house and please give us a call in 2 weeks [phone] to schedule an appointment.     4.) See one of our advanced practice nurses in a video visit or you can come see them in 2 weeks.      5.) Would continue to recommend a low fat, low cholesterol diet as well as a regular exercise program.    6.) Follow up appointment scheduled:   Follow up with   Parveen in 6 months .  An appointment will be made for you as you leave your visit.  It is always important to bring all your medications including over the counter medications, vitamins and supplements to your doctor visits. This will assist in providing the best care for your condition. Please bring your insurance cards to your appointment.           Lab Details  HEMOGLOBIN A1C  06/20/2024 03:52:09 PM  HGBA1C 6.5 <5.7 % H F  ESTIMATED AVERAGE GLUCOSE 140  mg/dL H F  COMP METABOLIC PANEL (14)  06/20/2024 03:44:30 PM  GLUCOSE 87 70-99 mg/dL  F  SODIUM 146 136-145 mmol/L H F  POTASSIUM 3.7 3.5-5.1 mmol/L  F  CHLORIDE 111  mmol/L  F  CO2 26.0 21.0-32.0 mmol/L  F  ANION GAP 9 0-18 mmol/L  F  BUN 34 9-23 mg/dL H F  CREATININE 1.11 0.55-1.02 mg/dL H F  BUN/ CREAT RATIO 30.6 10.0-20.0 H F  CALCIUM 9.6 8.7-10.4 mg/dL  F  OSMOLALITY CALCULATED 309 275-295 mOsm/kg H F  E GFR CR 48 >=60 mL/min/1.73m2 L F  ALT 14 10-49 U/L  F  AST 18 <=34 U/L  F  ALKALINE PHOSPHATASE 85  U/L  F  BILIRUBIN, TOTAL 0.9 0.2-1.1 mg/dL  F  TOTAL PROTEIN 6.4 5.7-8.2 g/dL  F  ALBUMIN 4.4 3.2-4.8 g/dL  F  GLOBULIN 2.0 2.0-3.5 g/dL  F  A/G RATIO 2.2 1.0-2.0 H F  FASTING PATIENT CMP ANSWER Yes   F  LIPID PANEL  06/20/2024 03:44:30 PM  CHOLESTEROL 173 <200 mg/dL  F  HDL CHOL 54 40-59 mg/dL  F  TRIGLYCERIDES 108  mg/dL  F  LDL CHOLESTROL 100 <100 mg/dL H F  VLDL 18 0-30 mg/dL  F  NON HDL CHOL 119 <130 mg/dL  F  FASTING PATIENT LIPID ANSWER Yes   F  ASSAY, THYROID STIM HORMONE  06/20/2024 03:44:30 PM  TSH 1.555 0.550-4.780 mIU/mL  F  PRO BETA NATRIURETIC PEPTIDE  06/20/2024 03:44:30 PM  B-TYPE NATRIURETIC PEPTIDE 128 <100 pg/mL H F  CBC W/ DIFFERENTIAL  06/20/2024 03:26:29 PM  WBC 12.6 4.0-11.0 x10(3) uL H F  RED BLOOD COUNT 3.87 3.80-5.30 x10(6)uL  F  HGB 11.4 12.0-16.0 g/dL L F  HCT 35.6 35.0-48.0 %  F  MEAN CELL VOLUME 92.0 80.0-100.0 fL  F  MEAN CORPUSCULAR HEMOGLOBIN 29.5 26.0-34.0 pg  F  MEAN CORPUSCULAR HGB CONC 32.0 31.0-37.0  g/dL  F  RED CELL DISTRIBUTION WIDTH-SD 46.5 35.1-46.3 fL H F  RED CELL DISTRIBUTION WIDTH CV 13.8 11.0-15.0 %  F  PLATELETS 199.0 150.0-450.0 10(3)uL  F  NEUTROPHIL ABS PRELIM 9.31 1.50-7.70 x10 (3) uL H F  NEUTROPHIL ABSOLUTE 9.31 1.50-7.70 x10(3) uL H F  LYMPHOCYTE ABSOLUTE 1.85 1.00-4.00 x10(3) uL  F  MONOCYTE ABSOLUTE 1.00 0.10-1.00 x10(3) uL  F  EOSINOPHIL ABSOLUTE 0.11 0.00-0.70 x10(3) uL  F  BASOPHIL ABSOLUTE 0.03 0.00-0.20 x10(3) uL  F  IMMATURE GRANULOCYTE COUNT 0.31 0.00-1.00 x10(3) uL  F  NEUTROPHIL % 73.8 %  F  LYMPHOCYTE % 14.7 %  F  MONOCYTE % 7.9 %  F  EOSINOPHIL % 0.9 %  F  BASOPHIL % 0.2 %  F  IMMATURE GRANULOCYTE RATIO % 2.5 %  F  CPOE Orders carried out by: Fredrick  Care Providers: Harriet Saini MD, Isidro Saini and Beti Robb  Electronically Authenticated by  Harriet Saini MD  06/24/2024 11:44:33 AM  Disclaimer: Components of this note were documented using voice recognition system and are subject to errors not corrected at proofreading. Contact the author of this note for any clarifications.

## 2024-09-26 NOTE — ANESTHESIA PREPROCEDURE EVALUATION
PRE-OP EVALUATION    Patient Name: Jinny Ocampo    Admit Diagnosis: aoritc stenosis    Pre-op Diagnosis: aoritc stenosis    TRANSCATHETER AORTIC VALVE REPLACEMENT FEMORAL APPROACH    Anesthesia Procedure: TRANSCATHETER AORTIC VALVE REPLACEMENT FEMORAL APPROACH    Surgeons and Role:     * Ryan Beckman MD - Primary     * Parrish Mccormick MD     * Lukas Lambert MD     * Jose De Jesus Ortiz MD     * Noah Kemp MD    Pre-op vitals reviewed.        There is no height or weight on file to calculate BMI.    Current medications reviewed.  Hospital Medications:   sodium chloride 0.9% infusion   Intravenous Continuous    iodixanol (VISIPAQUE) 320 MG/ML injection 100 mL  100 mL Injection ONCE PRN       Outpatient Medications:     Medications Prior to Admission   Medication Sig Dispense Refill Last Dose    hydroCHLOROthiazide 25 MG Oral Tab Take 2 tablets (50 mg total) by mouth daily. 180 tablet 0 2024    lisinopril 2.5 MG Oral Tab Take 1 tablet (2.5 mg total) by mouth daily.   2024    ATORVASTATIN 40 MG Oral Tab TAKE 1 TABLET(40 MG) BY MOUTH EVERY NIGHT 90 tablet 3 2024    latanoprost 0.005 % Ophthalmic Solution 1 drop nightly. AT BEDTIME   2024    [] predniSONE 20 MG Oral Tab Take 2 tablets (40 mg total) by mouth daily for 5 days. 10 tablet 0     [] sulfamethoxazole-trimethoprim -160 MG Oral Tab per tablet Take 1 tablet by mouth 2 (two) times daily for 10 days. 20 tablet 0     ibuprofen 200 MG Oral Tab Take 1 tablet (200 mg total) by mouth every 6 (six) hours as needed for Pain.       acetaminophen 500 MG Oral Tab Take 1 tablet (500 mg total) by mouth.          Allergies: Amlodipine; Amoxicillin; Antihistamines, loratadine-type; Codeine; Demerol; Lisinopril; and Zetia [ezetimibe]      Anesthesia Evaluation    Patient summary reviewed.    Anesthetic Complications  (+) history of anesthetic complications  History of: PONV       GI/Hepatic/Renal    Negative GI/hepatic/renal  ROS.                             Cardiovascular                  (+) hypertension             (+) valvular problems/murmurs and AS                       Endo/Other    Negative endo/other ROS.                              Pulmonary    Negative pulmonary ROS.                       Neuro/Psych    Negative neuro/psych ROS.                                  Past Surgical History:   Procedure Laterality Date    Biopsy of breast, incisional  01/01/2007    right-negative    Breast biopsy  07/22/2015    left breast biopsy.     Femur/knee surg unlisted  01/01/1985    Right knee, cartilage    Jenn biopsy stereo nodule 1 site right (cpt=19081)  02/2017    benign-hyalinized duct    Jenn biopsy stereo nodule 2 site bilat (cpt=19081/07952)  06/2015    atypia    Jenn localization wire 1 site left (cpt=19281)  07/2015    atypia    Needle biopsy right  2008    benign.    Oct, retina - os - left eye      Punc/aspir breast cyst  01/01/2008     Social History     Socioeconomic History    Marital status:    Tobacco Use    Smoking status: Never    Smokeless tobacco: Never   Vaping Use    Vaping status: Never Used   Substance and Sexual Activity    Alcohol use: Yes     Comment: socially only on Holidays    Drug use: No    Sexual activity: Not Currently     Partners: Male   Other Topics Concern    Caffeine Concern Yes     Comment: 3-4 cups daily     Exercise Yes     Comment: 4x a week    Seat Belt Yes     History   Drug Use No     Available pre-op labs reviewed.  Lab Results   Component Value Date    WBC 15.4 (H) 09/19/2024    RBC 3.62 (L) 09/19/2024    HGB 10.6 (L) 09/19/2024    HCT 32.0 (L) 09/19/2024    MCV 88.4 09/19/2024    MCH 29.3 09/19/2024    MCHC 33.1 09/19/2024    RDW 14.1 09/19/2024    .0 09/19/2024     Lab Results   Component Value Date     09/19/2024    K 3.5 09/19/2024     09/19/2024    CO2 25.0 09/19/2024    BUN 40 (H) 09/19/2024    CREATSERUM 1.13 (H) 09/19/2024    GLU 95 09/19/2024    CA 9.7  09/19/2024            Airway      Mallampati: III  Mouth opening: 3 FB  TM distance: 4 - 6 cm   Cardiovascular             Dental  Comment: No loose teeth per patient               Pulmonary                     Other findings              ASA: 4   Plan: general  NPO status verified and     Post-procedure pain management plan discussed with surgeon and patient.    Comment: GETA/LMA discussed in detail.  Risk of complications discussed including but not limited to sore throat, cough, PONV discussed. Also, discussed risks including dental injury particularly on any weakened, treated or diseased teeth & pt wishes to proceed  All questions answered.    Plan/risks discussed with: patient  Use of blood product(s) discussed with: patient    Consented to blood products.          Present on Admission:  **None**

## 2024-09-26 NOTE — ANESTHESIA POSTPROCEDURE EVALUATION
[facility] Patient Status:  Inpatient   Age/Gender 89 year old female MRN [id number]   Location [facility] 6NE-A Attending Ryan Beckman MD   Hosp Day # 0 PCP Geoff Cm MD       Anesthesia Post-op Note    TRANSCATHETER AORTIC VALVE REPLACEMENT FEMORAL APPROACH USING 23MM ANA TISSUE VALVE. INTRAOPERATIVE ANTIONE. FOR ADDITIONAL INFO SEE CATH LAB CHARTING    Procedure Summary       Date: 09/26/24 Room / Location:  CVOR 03 HYBRID /  CVOR    Anesthesia Start: 0725 Anesthesia Stop: 0916    Procedure: TRANSCATHETER AORTIC VALVE REPLACEMENT FEMORAL APPROACH USING 23MM ANA TISSUE VALVE. INTRAOPERATIVE ANTIONE. FOR ADDITIONAL INFO SEE CATH LAB CHARTING Diagnosis: (aoritc stenosis)    Surgeons: Ryan Beckman MD Anesthesiologist: Chapincito Xie MD    Anesthesia Type: general ASA Status: 4            Anesthesia Type: general    Vitals Value Taken Time   /127 09/26/24 0915   Temp 97 09/26/24 0917   Pulse 62 09/26/24 0917   Resp 16 09/26/24 0917   SpO2 97 % 09/26/24 0917   Vitals shown include unfiled device data.    Patient Location: ICU    Anesthesia Type: general    Airway Patency: patent    Postop Pain Control: adequate    Mental Status: mildly sedated but able to meaningfully participate in the post-anesthesia evaluation    Nausea/Vomiting: none    Cardiopulmonary/Hydration status: stable euvolemic    Complications: no apparent anesthesia related complications    Postop vital signs: stable    Dental Exam: Unchanged from Preop    Sign out given to ICU staff.

## 2024-09-26 NOTE — PROCEDURES
Cardiology Transesophageal Echo Note     PRE and POST PROCEDURE DIAGNOSIS:   1. Aortic stenosis, severe and symptomatic.  2. S/pTAVR (23 mm S3 Chelsy)     PROCEDURE: Transesophageal Echocardiogram ANTIONE - intra-operative during TAVR.    The patient was already intubated and sedated by anesthesiologist. ANTIONE probe placed by anesthesia.  ANTIONE probe was advanced to mid-esophageal level and transesophageal views were obtained and reviewed. Gastric views were obtained. The patient was stable hemodynamically and tolerated procedure very well. A trivial pericardial effusion was noted from the getgo.  This was circumferential.     ANTIONE guidance to cross aortic valve, with wire well positioned in the LV apex.  Mitral apparatus was intact.     Ventilation held and external pacing used for TAVR deployment under ANTIONE guidance.  Excellent results obtained.  Valve position verified.  This was uneventful.       Patient was monitored post-procedure with no echo evidence of aortic root disruption or hematoma formation. Pericardial effusion absent.    Findings:  LVSF vigorous (LVEF 65%) without wall motion abnormalities.   RV size was normal with preserved systolic function.   No mitral stenosis with trivial mitral regurgitatoin; normal tricuspid valve.  Pulmonic valve was not well visualized.      There was no evidence for intracardiac mass or thrombus  Aortic valve - heavily calcified, with severe reduction in cusp separation. Functionally bicuspid.  Calcified aortic root.    Diameters:    - LVOT = 23 mm   Pre-TAVR  - mean gradient = 34 mmHg  - peak velocity = 3.9 m/sec  - trivial AI  - dimensionless index (VTI) of 0.23, which suggests critical AS    Post-TAVR  - mean gradient = 5 mmHg   - no perivalvular AI at the aorto-mitral curtain    Paras Brown MD

## 2024-09-27 ENCOUNTER — APPOINTMENT (OUTPATIENT)
Dept: GENERAL RADIOLOGY | Facility: HOSPITAL | Age: 89
End: 2024-09-27
Attending: NURSE PRACTITIONER
Payer: MEDICARE

## 2024-09-27 ENCOUNTER — APPOINTMENT (OUTPATIENT)
Dept: CV DIAGNOSTICS | Facility: HOSPITAL | Age: 89
End: 2024-09-27
Attending: NURSE PRACTITIONER
Payer: MEDICARE

## 2024-09-27 ENCOUNTER — APPOINTMENT (OUTPATIENT)
Dept: CV DIAGNOSTICS | Facility: HOSPITAL | Age: 89
DRG: 267 | End: 2024-09-27
Attending: NURSE PRACTITIONER
Payer: MEDICARE

## 2024-09-27 ENCOUNTER — APPOINTMENT (OUTPATIENT)
Dept: GENERAL RADIOLOGY | Facility: HOSPITAL | Age: 89
DRG: 267 | End: 2024-09-27
Attending: NURSE PRACTITIONER
Payer: MEDICARE

## 2024-09-27 VITALS
HEART RATE: 62 BPM | WEIGHT: 166 LBS | DIASTOLIC BLOOD PRESSURE: 40 MMHG | SYSTOLIC BLOOD PRESSURE: 128 MMHG | BODY MASS INDEX: 31 KG/M2 | RESPIRATION RATE: 17 BRPM | TEMPERATURE: 97 F | OXYGEN SATURATION: 98 %

## 2024-09-27 PROBLEM — Z95.3 STATUS POST TRANSCATHETER AORTIC VALVE REPLACEMENT (TAVR) USING BIOPROSTHESIS: Status: ACTIVE | Noted: 2024-09-27

## 2024-09-27 LAB
ANION GAP SERPL CALC-SCNC: 6 MMOL/L (ref 0–18)
ATRIAL RATE: 61 BPM
ATRIAL RATE: 61 BPM
BLOOD TYPE BARCODE: 5100
BUN BLD-MCNC: 28 MG/DL (ref 9–23)
CALCIUM BLD-MCNC: 8.7 MG/DL (ref 8.7–10.4)
CHLORIDE SERPL-SCNC: 113 MMOL/L (ref 98–112)
CO2 SERPL-SCNC: 23 MMOL/L (ref 21–32)
CREAT BLD-MCNC: 1 MG/DL
EGFRCR SERPLBLD CKD-EPI 2021: 54 ML/MIN/1.73M2 (ref 60–?)
ERYTHROCYTE [DISTWIDTH] IN BLOOD BY AUTOMATED COUNT: 14.2 %
GLUCOSE BLD-MCNC: 128 MG/DL (ref 70–99)
HCT VFR BLD AUTO: 29.7 %
HGB BLD-MCNC: 9.8 G/DL
INR BLD: 1.06 (ref 0.8–1.2)
ISTAT ACTIVATED CLOTTING TIME: 146 SECONDS (ref 74–137)
MAGNESIUM SERPL-MCNC: 2 MG/DL (ref 1.6–2.6)
MCH RBC QN AUTO: 28.8 PG (ref 26–34)
MCHC RBC AUTO-ENTMCNC: 33 G/DL (ref 31–37)
MCV RBC AUTO: 87.4 FL
OSMOLALITY SERPL CALC.SUM OF ELEC: 301 MOSM/KG (ref 275–295)
P AXIS: 47 DEGREES
P AXIS: 67 DEGREES
P-R INTERVAL: 144 MS
P-R INTERVAL: 166 MS
PLATELET # BLD AUTO: 136 10(3)UL (ref 150–450)
PLATELETS.RETICULATED NFR BLD AUTO: 5.6 % (ref 0–7)
POTASSIUM SERPL-SCNC: 4.1 MMOL/L (ref 3.5–5.1)
POTASSIUM SERPL-SCNC: 4.1 MMOL/L (ref 3.5–5.1)
PROTHROMBIN TIME: 13.9 SECONDS (ref 11.6–14.8)
Q-T INTERVAL: 434 MS
Q-T INTERVAL: 474 MS
QRS DURATION: 90 MS
QRS DURATION: 92 MS
QTC CALCULATION (BEZET): 436 MS
QTC CALCULATION (BEZET): 477 MS
R AXIS: -21 DEGREES
R AXIS: 5 DEGREES
RBC # BLD AUTO: 3.4 X10(6)UL
SODIUM SERPL-SCNC: 142 MMOL/L (ref 136–145)
T AXIS: 56 DEGREES
T AXIS: 66 DEGREES
UNIT VOLUME: 350 ML
VENTRICULAR RATE: 61 BPM
VENTRICULAR RATE: 61 BPM
WBC # BLD AUTO: 15.6 X10(3) UL (ref 4–11)

## 2024-09-27 PROCEDURE — 93325 DOPPLER ECHO COLOR FLOW MAPG: CPT | Performed by: NURSE PRACTITIONER

## 2024-09-27 PROCEDURE — 97161 PT EVAL LOW COMPLEX 20 MIN: CPT

## 2024-09-27 PROCEDURE — 97530 THERAPEUTIC ACTIVITIES: CPT

## 2024-09-27 PROCEDURE — 85610 PROTHROMBIN TIME: CPT | Performed by: NURSE PRACTITIONER

## 2024-09-27 PROCEDURE — 93321 DOPPLER ECHO F-UP/LMTD STD: CPT | Performed by: NURSE PRACTITIONER

## 2024-09-27 PROCEDURE — 97165 OT EVAL LOW COMPLEX 30 MIN: CPT

## 2024-09-27 PROCEDURE — 93308 TTE F-UP OR LMTD: CPT | Performed by: NURSE PRACTITIONER

## 2024-09-27 PROCEDURE — 84132 ASSAY OF SERUM POTASSIUM: CPT | Performed by: INTERNAL MEDICINE

## 2024-09-27 PROCEDURE — 85027 COMPLETE CBC AUTOMATED: CPT | Performed by: NURSE PRACTITIONER

## 2024-09-27 PROCEDURE — 83735 ASSAY OF MAGNESIUM: CPT | Performed by: NURSE PRACTITIONER

## 2024-09-27 PROCEDURE — 97535 SELF CARE MNGMENT TRAINING: CPT

## 2024-09-27 PROCEDURE — 93010 ELECTROCARDIOGRAM REPORT: CPT | Performed by: INTERNAL MEDICINE

## 2024-09-27 PROCEDURE — 80048 BASIC METABOLIC PNL TOTAL CA: CPT | Performed by: NURSE PRACTITIONER

## 2024-09-27 PROCEDURE — 93005 ELECTROCARDIOGRAM TRACING: CPT

## 2024-09-27 PROCEDURE — 71045 X-RAY EXAM CHEST 1 VIEW: CPT | Performed by: NURSE PRACTITIONER

## 2024-09-27 RX ORDER — ASPIRIN 81 MG/1
81 TABLET, CHEWABLE ORAL DAILY
Qty: 30 TABLET | Refills: 11 | Status: SHIPPED | OUTPATIENT
Start: 2024-09-27

## 2024-09-27 RX ORDER — ASPIRIN 81 MG/1
81 TABLET, CHEWABLE ORAL DAILY
Status: DISCONTINUED | OUTPATIENT
Start: 2024-09-27 | End: 2024-09-27

## 2024-09-27 NOTE — PROGRESS NOTES
[facility] Cardiology Progress Note    Jinny Ocampo Patient Status:  Inpatient    1935 MRN [id number]   Location [facility] 6NE-A Attending Ryan Beckman MD   Hosp Day # 1 PCP Geoff Cm MD     Subjective:  No acute events overnight  Up walking to bathroom, denies dizziness, LH, SOB  Denies tenderness to right groin     Objective:  /39 (BP Location: Right arm)   Pulse 57   Temp 98.2 °F (36.8 °C) (Temporal)   Resp 15   Wt 166 lb 0.1 oz (75.3 kg)   SpO2 99%   BMI 31.37 kg/m²     Telemetry: NSR with occaisional PACs       Intake/Output:    Intake/Output Summary (Last 24 hours) at 2024 0736  Last data filed at 2024 1800  Gross per 24 hour   Intake 1170 ml   Output 350 ml   Net 820 ml       Last 3 Weights   24 1402 166 lb 0.1 oz (75.3 kg)   24 1029 166 lb (75.3 kg)   24 1218 166 lb (75.3 kg)       Labs:  Recent Labs   Lab 24  0921 24  0349   * 128*   BUN 33* 28*   CREATSERUM 0.96 1.00   EGFRCR 57* 54*   CA 8.3* 8.7    142   K 3.2* 4.1  4.1    113*   CO2 22.0 23.0     Recent Labs   Lab 24  0921 24  0349   RBC 3.24* 3.40*   HGB 9.5* 9.8*   HCT 28.5* 29.7*   MCV 88.0 87.4   MCH 29.3 28.8   MCHC 33.3 33.0   RDW 14.1 14.2   WBC 14.6* 15.6*   .0* 136.0*         No results for input(s): \"TROP\", \"TROPHS\", \"CK\" in the last 168 hours.    Diagnostics:             Physical Exam:    Physical Exam  Cardiovascular:      Rate and Rhythm: Normal rate and regular rhythm.      Pulses: Normal pulses.      Comments: Right groin open to air, no ecchymosis or drainage, + DPP bilaterally      Pulmonary:      Effort: Pulmonary effort is normal.      Breath sounds: No rales.   Abdominal:      Palpations: Abdomen is soft.   Musculoskeletal:      Right lower leg: No edema.   Neurological:      Mental Status: She is alert and oriented to person, place, and time.      aortic JUAN PABLO, URSB    Medications:   atorvastatin  40 mg  Oral Nightly    hydroCHLOROthiazide  50 mg Oral Daily    lisinopril  2.5 mg Oral Daily    famotidine  20 mg Oral Daily    Or    famotidine  20 mg Intravenous Daily      sodium chloride 50 mL/hr at 09/26/24 0924    lactated ringers      nitroGLYCERIN in dextrose 5%      norepinephrine         Assessment:    Severe Symptomatic Aortic Stenosis- POD 1 s/p TAVR  Maintaining SR, some PACs but no Afib, narrow QRS  Hemodyanmics stable   Preserved LVEF   HTN  Controlled on home lisinopril,HCTZ  HL  PVD    Plan:    Aspirin 81mg daily post TAVR  DC home pending echo results.     Ophelia West, APRN  9/27/2024  7:17 AM  Ph [phone] ([place])  Ph [phone] ([place])      Seen and examined.  Agree with exam and assessment as amended.  Echo looks good, normal TAVR function, normal LVEF and no effusion.  Discharge home today.  Follow up office with Dr Saini and structural heart clinic as instructed.  D/W pt and bedside nursing.  Cardiac MDM reviewed in detail with CECELIA West.    HEDY Ashley MD  3

## 2024-09-27 NOTE — CARDIAC REHAB
Cardiac rehab education completed with patient  Patient referred to cardiac rehab  Patient declined phase 2 cardiac rehab at this time--exercises at [facility]

## 2024-09-27 NOTE — PROGRESS NOTES
[facility] Cardiology Progress Note    Jinny Ocampo Patient Status:  Inpatient    1935 MRN [id number]   Location [place] [address]NE-A Attending Ryan Beckman MD   Hosp Day # 1 PCP Geoff Cm MD     Subjective:  ***    Objective:  /39 (BP Location: Right arm)   Pulse 57   Temp 98.2 °F (36.8 °C) (Temporal)   Resp 15   Wt 166 lb 0.1 oz (75.3 kg)   SpO2 99%   BMI 31.37 kg/m²     Telemetry: ***      Intake/Output:    Intake/Output Summary (Last 24 hours) at 2024 0717  Last data filed at 2024 1800  Gross per 24 hour   Intake 1170 ml   Output 350 ml   Net 820 ml       Last 3 Weights   24 1402 166 lb 0.1 oz (75.3 kg)   24 1029 166 lb (75.3 kg)   24 1218 166 lb (75.3 kg)       Labs:  Recent Labs   Lab 24  0921 24  0349   * 128*   BUN 33* 28*   CREATSERUM 0.96 1.00   EGFRCR 57* 54*   CA 8.3* 8.7    142   K 3.2* 4.1  4.1    113*   CO2 22.0 23.0     Recent Labs   Lab 24  0921 24  0349   RBC 3.24* 3.40*   HGB 9.5* 9.8*   HCT 28.5* 29.7*   MCV 88.0 87.4   MCH 29.3 28.8   MCHC 33.3 33.0   RDW 14.1 14.2   WBC 14.6* 15.6*   .0* 136.0*         No results for input(s): \"TROP\", \"TROPHS\", \"CK\" in the last 168 hours.    Diagnostics:             Physical Exam:    Physical Exam    Medications:   atorvastatin  40 mg Oral Nightly    hydroCHLOROthiazide  50 mg Oral Daily    lisinopril  2.5 mg Oral Daily    famotidine  20 mg Oral Daily    Or    famotidine  20 mg Intravenous Daily      sodium chloride 50 mL/hr at 24 0924    lactated ringers      nitroGLYCERIN in dextrose 5%      norepinephrine         Assessment:    Severe Symptomatic Aortic Stenosis- POD 1 s/p TAVR  Preserved LVEF   HTN  HL  PVD    Plan:        Ophelia West, APRN  2024  7:17 AM  Ph [phone] ([place])  Ph [phone] ([place])

## 2024-09-27 NOTE — OCCUPATIONAL THERAPY NOTE
OCCUPATIONAL THERAPY EVALUATION - INPATIENT    Room Number: 6604/6604-A  Evaluation Date: 9/27/2024     Type of Evaluation: Initial  Presenting Problem: TAVR 9/26    Physician Order: IP Consult to Occupational Therapy  Reason for Therapy:  ADL/IADL Dysfunction and Discharge Planning      OCCUPATIONAL THERAPY ASSESSMENT   Patient is a 89 year old female admitted on 9/26/2024 with Presenting Problem: TAVR 9/26. Co-Morbidities : OA  Patient is currently functioning at baseline with  all ADL .  Prior to admission, patient's baseline is independent.  Patient met all OT goals at indepndent level.  Patient reports no further questions/concerns at this time.     WEIGHT BEARING RESTRICTION  Weight Bearing Restriction: None                Recommendations for nursing staff:   Transfers: modified independent  Toileting location: Toilet    EVALUATION SESSION:  Patient at start of session: supine  FUNCTIONAL TRANSFER ASSESSMENT  Sit to Stand: Edge of Bed  Edge of Bed: Modified Independent    BED MOBILITY  Supine to Sit : Independent       COGNITION  Overall Cognitive Status:  WFL - within functional limits  COGNITION ASSESSMENTS       Upper Extremity:   ROM: within functional limits   Strength: is within functional limits       EDUCATION PROVIDED  Patient: Role of Occupational Therapy; Plan of Care; Fall Prevention; Posture/Positioning; Surgical Precautions  Patient's Response to Education: Verbalized Understanding    Equipment used: cane    Therapist comments: Educated the pt about post-TAVR activity guidelines that relate to ADL tasks. Used MD instruction sheet.  Verbalized understanding. Cueing provided for pacing. Completed pt education about energy conservation. Verbalized understanding. Modified independent bed mobility, sit to stand transfer using cane, and ambulation with cane.        Patient End of Session: Up in chair;Needs met;Call light within reach;RN aware of session/findings;All patient questions and concerns  addressed    OCCUPATIONAL PROFILE    HOME SITUATION  Type of Home: House ([facility])  Home Layout: One level  Lives With: Alone (dtr and son are able to check on her daily)    Toilet and Equipment: Comfort height toilet                Drives: Yes  Patient Regularly Uses: Glasses    Prior Level of Function: lives at [facility]. Uses cane or RW.  Lives alone, but daughter and son check in and visit her often.   Enjoys reading. Fell at [employer] in March when she was reaching for frozen item and missed the door handle.     PAIN ASSESSMENT  Ratin          OBJECTIVE  Precautions: None  Fall Risk: Standard fall risk    WEIGHT BEARING RESTRICTION  Weight Bearing Restriction: None                AM-PAC ‘6-Clicks’ Inpatient Daily Activity Short Form  -   Putting on and taking off regular lower body clothing?: None  -   Bathing (including washing, rinsing, drying)?: None  -   Toileting, which includes using toilet, bedpan or urinal? : None  -   Putting on and taking off regular upper body clothing?: None  -   Taking care of personal grooming such as brushing teeth?: None  -   Eating meals?: None    AM-PAC Score:  Score: 24  Approx Degree of Impairment: 0%  Standardized Score (AM-PAC Scale): 57.54      ADDITIONAL TESTS     NEUROLOGICAL FINDINGS        PLAN   Patient has been evaluated and presents with no skilled Occupational Therapy needs at this time.  Patient discharged from Occupational Therapy services.  Please re-order if a new functional limitation presents during this admission.      Patient Evaluation Complexity Level:   Occupational Profile/Medical History LOW - Brief history including review of medical or therapy records    Specific performance deficits impacting engagement in ADL/IADL LOW  1 - 3 performance deficits    Client Assessment/Performance Deficits MODERATE - Comorbidities and min to mod modifications of tasks    Clinical Decision Making LOW - Analysis of occupational profile, problem-focused assessments,  limited treatment options    Overall Complexity LOW     OT Session Time: 20 minutes  Self-Care Home Management: 10 minutes  Therapeutic Activity: 5 minutes

## 2024-09-27 NOTE — PLAN OF CARE
NURSING DISCHARGE NOTE    Received care at approximately 0730. A&Ox4. Room air, NSR on tele. Bilateral groin sites CDI. No complaints of pain. Ambulating with one assist & walker.     Discharged Home via Wheelchair.  Accompanied by Family member and RN  Belongings Taken by patient/family.

## 2024-09-27 NOTE — OPERATIVE REPORT
[facility]    PATIENT'S NAME: NANCY TAYLOR   ATTENDING PHYSICIAN: Ryan Beckman M.D.   OPERATING PHYSICIAN: Lukas Mccabe M.D.   PATIENT ACCOUNT#:   [id number]    LOCATION:  [address] [place] [address]  MEDICAL RECORD #:   [id number]       YOB: 1935  ADMISSION DATE:       09/26/2024      OPERATION DATE:  09/26/2024    OPERATIVE REPORT      PREOPERATIVE DIAGNOSIS:  Severe aortic stenosis.  POSTOPERATIVE DIAGNOSIS:    PROCEDURE PERFORMED:  Transcatheter aortic valve replacement.    OPERATORS:  Ryan Beckman MD/ Lukas Mccabe MD/Maximo Molina MD/Jose De Jesus Ortiz MD.    OPERATIVE TECHNIQUE:  After informed consent obtained, patient was prepped and draped in the usual sterile fashion.  The patient was in the hybrid operating room.  Using ultrasound, right and left common femoral arteries were identified and entered using a micropuncture technique.  Two Perclose devices were placed in a preclose fashion in the right common femoral artery with placement of a 14-Barbadian Mcnamara eSheath.  A 6-Barbadian sheath was placed in the left common femoral artery.  A pigtail catheter was advanced via the left common femoral artery site into the aortic annulus to obtain imaging of the aortic annulus.  Next, an AL1 with a straight wire was used to enter the left ventricle.  Ultimately, a Savvy pacing wire was placed in the left ventricular apex.    Next, a 23 mm Mcnamara S3 Ultra valve with Resilia was prepped and brought across the aortic annulus.  This was deployed with rapid pacing.  Subsequent ANTIONE interrogation revealed good placement of the valve without significant aortic insufficiency or stenosis.  Procedure was terminated.  Delivery device was removed.  Two Perclose devices were reversed to achieve good hemostasis.  A Perclose device was placed in the left common femoral artery to achieve hemostasis.  There were no apparent acute complications noted.  The patient tolerated procedure well.    SUMMARY:  In  summary, the patient today underwent placement of a 23 mm Mcnamara S3 Ultra valve as described above.    Dictated By Lukas Mccabe M.D.  d: 09/26/2024 13:51:24  t: 09/26/2024 21:12:42  [facility] [id number]  AR/

## 2024-09-27 NOTE — PHYSICAL THERAPY NOTE
PHYSICAL THERAPY EVALUATION - INPATIENT     Room Number: 6604/6604-A  Evaluation Date: 2024  Type of Evaluation: Initial  Physician Order: PT Eval and Treat    Presenting Problem: s/p Percutaneous transcatheter aortic valve replacement.   Co-Morbidities : OA  Reason for Therapy: Mobility Dysfunction and Discharge Planning    PHYSICAL THERAPY ASSESSMENT   Patient is currently functioning at baseline with bed mobility, transfers, gait, and stair negotiation.  Prior to admission, patient's baseline is mod I with SPC.       PLAN      Patient has met all skilled IPPT goals at this time. Patient will be discharged from Physical Therapy services.  Please re-order if a new functional limitation presents during this admission.              PHYSICAL THERAPY MEDICAL/SOCIAL HISTORY  History related to current admission: Patient is a 89 year old female admitted on 2024 from home for planned procedure s/p TAVR .      HOME SITUATION  Type of Home: House ([facility])   Home Layout: One level  Stairs to Enter : 2             Lives With: Alone (dtr and son are able to check on her daily)  Drives: Yes  Patient Owned Equipment: Cane  Patient Regularly Uses: Glasses    Prior Level of Banner: Reports fall in March, slipped in Jewel, normally mod I with SPC    SUBJECTIVE  \" I feel pretty good\"       OBJECTIVE  Precautions: None  Fall Risk: Standard fall risk    WEIGHT BEARING RESTRICTION  Weight Bearing Restriction: None                PAIN ASSESSMENT  Ratin          COGNITION  Overall Cognitive Status:  WFL - within functional limits    RANGE OF MOTION AND STRENGTH ASSESSMENT  Upper extremity ROM and strength are within functional limits     Lower extremity ROM is within functional limits     Lower extremity strength is within functional limits      BALANCE  Static Sitting: Good  Dynamic Sitting: Good  Static Standing: Good  Dynamic Standing: Good    ADDITIONAL TESTS                                    ACTIVITY  TOLERANCE           BP: 155/50  BP Location: Left arm  BP Method: Automatic  Patient Position: Sitting    O2 WALK       NEUROLOGICAL FINDINGS                        AM-PAC '6-Clicks' INPATIENT SHORT FORM - BASIC MOBILITY  How much difficulty does the patient currently have...  Patient Difficulty: Turning over in bed (including adjusting bedclothes, sheets and blankets)?: None   Patient Difficulty: Sitting down on and standing up from a chair with arms (e.g., wheelchair, bedside commode, etc.): None   Patient Difficulty: Moving from lying on back to sitting on the side of the bed?: None   How much help from another person does the patient currently need...   Help from Another: Moving to and from a bed to a chair (including a wheelchair)?: None   Help from Another: Need to walk in hospital room?: None   Help from Another: Climbing 3-5 steps with a railing?: None       AM-PAC Score:  Raw Score: 24   Approx Degree of Impairment: 0%   Standardized Score (AM-PAC Scale): 61.14   CMS Modifier (G-Code): CH    FUNCTIONAL ABILITY STATUS  Gait Assessment   Functional Mobility/Gait Assessment  Gait Assistance: Modified independent  Distance (ft): 150  Assistive Device: Cane  Pattern: Within Functional Limits  Stairs: Stairs  How Many Stairs: 1  Device: 1 Rail;Cane  Assist: Modified independent  Pattern: Ascend and Descend  Ascend and Descend : Step to    Skilled Therapy Provided     Bed Mobility:  Rolling: indep  Supine to sit: indep   Sit to supine: indep     Transfer Mobility:  Sit to stand: indep   Stand to sit: indep  Gait = SBA progressing to mod I     Therapist's Comments: Discussed case with RN prior to session initiation. Pt agreeable to participation in therapy. Pt educated on post op precautions with education on EC and pacing techniques while ambulating with SPC.       Exercise/Education Provided:  Energy conservation  Functional activity tolerated    Patient End of Session: Up in chair;Needs met;Call light within  reach;RN aware of session/findings;All patient questions and concerns addressed      Patient Evaluation Complexity Level:  History Low - no personal factors and/or co-morbidities   Examination of body systems Low -  addressing 1-2 elements   Clinical Presentation Low- Stable   Clinical Decision Making Low Complexity       PT Session Time: 15 minutes  Gait Training:  minutes  Therapeutic Activity: 8 minutes  Neuromuscular Re-education:  minutes  Therapeutic Exercise:  minutes   Spot Size: Large [employer]

## 2024-09-27 NOTE — OPERATIVE REPORT
[facility]    PATIENT'S NAME: NANCY TAYLOR   ATTENDING PHYSICIAN: Ryan Beckman M.D.   OPERATING PHYSICIAN: Ryan Beckman M.D.   PATIENT ACCOUNT#:   [id number]    LOCATION:  [address] [place] [address]  MEDICAL RECORD #:   [id number]       YOB: 1935  ADMISSION DATE:       09/26/2024      OPERATION DATE:  09/26/2024    OPERATIVE REPORT    PREOPERATIVE DIAGNOSIS:    POSTOPERATIVE DIAGNOSIS:    PROCEDURE PERFORMED:  Percutaneous transcatheter aortic valve replacement.    HISTORY:  The patient is an 89-year-old woman referred for an attempt at percutaneous transcatheter aortic valve replacement due to the presence of severe symptomatic calcific aortic stenosis.    PROCEDURE:  After obtaining informed consent, the patient was brought to the hybrid operating room and placed under general anesthesia by Dr. Xie.  Using ultrasound guidance and a micropuncture technique, a 6-Gibraltarian sheath was placed in the right common femoral artery and a 6-Gibraltarian sheath in the left common femoral artery.  The right-sided sheath was upsized to the 14-Gibraltarian delivery sheath using a preclose technique.    Heparin was administered for systemic anticoagulation.  A pigtail catheter was placed in the ascending aorta, and the SavvyWire was used for pacing and valve deployment.    We placed a #23 Chelsy 3 aortic valve at nominal pressure.  The valve sat beautifully.  There was no evidence of aortic disruption or significant aortic insufficiency.  Mean gradient across the valve postprocedure was 5 mmHg.    The patient tolerated the procedure well and was without apparent acute complications.  At the end of the procedure, hemostasis was achieved by deploying Perclose devices in the femoral vessels.    The patient was taken in good condition by Dr. Xie to the coronary care unit for further management.  I updated the patient's son, Deshawn, on his mother's condition immediately after the procedure.    Dr. Lukas Mccabe,   Maximo Molina, and Dr. Jose De Jesus Ortiz were co-operators in this case.    Dictated By Ryan Beckman M.D.  d: 09/26/2024 08:59:51  t: 09/26/2024 16:40:45  [facility] [id number]  WS/

## 2024-09-30 ENCOUNTER — TELEPHONE (OUTPATIENT)
Dept: FAMILY MEDICINE CLINIC | Facility: CLINIC | Age: 89
End: 2024-09-30

## 2024-09-30 ENCOUNTER — PATIENT OUTREACH (OUTPATIENT)
Dept: CASE MANAGEMENT | Age: 89
End: 2024-09-30

## 2024-09-30 DIAGNOSIS — I35.0 AORTIC VALVE STENOSIS, ETIOLOGY OF CARDIAC VALVE DISEASE UNSPECIFIED: Primary | ICD-10-CM

## 2024-09-30 DIAGNOSIS — Z02.9 ENCOUNTERS FOR ADMINISTRATIVE PURPOSES: ICD-10-CM

## 2024-09-30 PROCEDURE — 1111F DSCHRG MED/CURRENT MED MERGE: CPT

## 2024-09-30 NOTE — PROGRESS NOTES
Transitional Care Management   Discharge Date: 24  Contact Date: 2024    Assessment:  TCM Initial Assessment    General:  Assessment completed with: Patient  Patient Subjective: I just tired today but not too bad. This morning I was able to move my bowels.  Chief Complaint: TAVR  Verify patient name and  with patient/ caregiver: Yes    Hospital Stay/Discharge:  Tell me what you understand of why you were in the hospital or emergency department: surgery  Prior to leaving the hospital were your Discharge Instructions reviewed with you?: Yes  Did you receive a copy of your written Discharge Instructions?: Yes  What questions do you have about your Discharge Instructions?: none  Do you feel better or worse since you left the hospital or emergency department?: Same    Follow - Up Appointment:  Do you have a follow-up appointment?: Yes  Date: 10/04/24  Physician: cardiology  Are there any barriers to getting to your follow-up appointment?: No    Home Health/DME:  Prior to leaving the hospital was Home Health (HH) arranged for you?: No     Prior to leaving the hospital or emergency department was Durable Medical Equipment (DME), medical supplies, or infusions arranged for you?: No  Are DME/medical supply/infusions needs identified by staff during this assessment?: No     Medications/Diet:  Did any of your medications change, during or after your hospital stay or ED visit?: Yes  Do you have your new or updated medications?: Yes  Do you understand what your medications are for and possible side effects?: Yes  Are there any reasons that keep you from taking your medication as prescribed?: No  Any concerns about medication refills?: No    Were you given a different diet per your Discharge Instructions?: No     Questions/Concerns:  Do you have any questions or concerns that have not been discussed?: No           Nursing Interventions: NCM reviewed discharge instructions and when to seek medical attention with the  patient. She states that she is just tired but not too bad. [facility] instructed on s/s of infection; she v/u and denies groin sites have any s/s. She does state that she called cardiology on Saturday as her left thigh, near the groin site, really hurt her. She s/w the nurse who instructed her to take tylenol. She states that the pain has improved. She denies that there is any swelling or discoloration in the area of pain. She states that she does have a little bruise in the corner of her right eye that she is not sure what is from. She denies that it is bothering her. She states that she did have some dizziness yesterday when watching TV but is fine today. [facility] discussed keeping a bp log and she v/u and states that she will check it. She denies having any fever, n/v/c/d, sob, HA or any other new or worsening symptoms. Med review completed. She denied having any questions or concerns at this time.     Medication Review:   Current Outpatient Medications   Medication Sig Dispense Refill    aspirin 81 MG Oral Chew Tab Chew 1 tablet (81 mg total) by mouth daily. 30 tablet 11    hydroCHLOROthiazide 25 MG Oral Tab Take 2 tablets (50 mg total) by mouth daily. 180 tablet 0    lisinopril 2.5 MG Oral Tab Take 1 tablet (2.5 mg total) by mouth daily.      ATORVASTATIN 40 MG Oral Tab TAKE 1 TABLET(40 MG) BY MOUTH EVERY NIGHT 90 tablet 3    latanoprost 0.005 % Ophthalmic Solution 1 drop nightly. AT BEDTIME      ibuprofen 200 MG Oral Tab Take 1 tablet (200 mg total) by mouth every 6 (six) hours as needed for Pain.      acetaminophen 500 MG Oral Tab Take 1 tablet (500 mg total) by mouth.       Did patient review medications using current pill bottles and not just a medication list?  No  Discharge medications reviewed/discussed/and reconciled against outpatient medications with patient.  Any changes or updates to medications sent to primary care provider.  Patient Acknowledged    SDOH:   Transportation Needs: No Transportation Needs  (9/26/2024)    Transportation Needs     Lack of Transportation: No     Car Seat: Not on file     Financial Resource Strain: Low Risk  (9/30/2024)    Financial Resource Strain     Difficulty of Paying Living Expenses: Not hard at all     Med Affordability: No       Follow-up Appointments:  Your appointments       Date & Time Appointment Department (Center)    Oct 29, 2024 12:15 PM CDT TAVR Visit with Apn, [facility] [place] ([facility]              [facility]  [address] [address]  [phone]            Transitional Care Clinic  Was TCC Ordered: No      Primary Care Provider (If no TCC appointment)  Does patient already have a PCP appointment scheduled? No  Nurse Care Manager Attempted to schedule PCP office TCM appointment with patient   -If no appointment scheduled: Explain -pt states that she is following up with cardiology at this time. NCM sent TE to PCP office    Specialist  Does the patient have any other follow-up appointment(s) that need to be scheduled? Yes   -If yes: Nurse Care Manager reviewed upcoming specialist appointments with patient: Yes   -Does the patient need assistance scheduling appointment(s): No, pt has cardiology appt on 10/4    CCM referral placed:  No    Book By Date: 10/11/24

## 2024-09-30 NOTE — TELEPHONE ENCOUNTER
VERONICA, Spoke to patient for TCM today.  Patient states that she will be following up with cardiology at this time and will schedule an appt with PCP at a later time.  Per guidelines patient should  be seen within seven days by 10/4/24.     Otherwise, last date for TCM: 10/11/24

## 2024-10-07 ENCOUNTER — LAB ENCOUNTER (OUTPATIENT)
Dept: LAB | Age: 89
End: 2024-10-07
Attending: PHYSICIAN ASSISTANT
Payer: MEDICARE

## 2024-10-07 ENCOUNTER — OFFICE VISIT (OUTPATIENT)
Dept: FAMILY MEDICINE CLINIC | Facility: CLINIC | Age: 89
End: 2024-10-07
Payer: MEDICARE

## 2024-10-07 VITALS
SYSTOLIC BLOOD PRESSURE: 130 MMHG | HEIGHT: 61 IN | BODY MASS INDEX: 31.72 KG/M2 | WEIGHT: 168 LBS | HEART RATE: 62 BPM | RESPIRATION RATE: 16 BRPM | OXYGEN SATURATION: 98 % | DIASTOLIC BLOOD PRESSURE: 60 MMHG

## 2024-10-07 DIAGNOSIS — I10 ESSENTIAL HYPERTENSION, MALIGNANT: ICD-10-CM

## 2024-10-07 DIAGNOSIS — R53.83 FATIGUE: ICD-10-CM

## 2024-10-07 DIAGNOSIS — I10 ESSENTIAL HYPERTENSION: ICD-10-CM

## 2024-10-07 DIAGNOSIS — I35.0 NONRHEUMATIC AORTIC VALVE STENOSIS: Primary | ICD-10-CM

## 2024-10-07 DIAGNOSIS — E87.6 HYPOPOTASSEMIA: ICD-10-CM

## 2024-10-07 DIAGNOSIS — Z95.3 STATUS POST TRANSCATHETER AORTIC VALVE REPLACEMENT (TAVR) USING BIOPROSTHESIS: ICD-10-CM

## 2024-10-07 DIAGNOSIS — R06.09 DYSPNEA ON EXERTION: Primary | ICD-10-CM

## 2024-10-07 LAB
ANION GAP SERPL CALC-SCNC: 7 MMOL/L (ref 0–18)
BASOPHILS # BLD AUTO: 0.04 X10(3) UL (ref 0–0.2)
BASOPHILS NFR BLD AUTO: 0.4 %
BUN BLD-MCNC: 31 MG/DL (ref 9–23)
CALCIUM BLD-MCNC: 10.1 MG/DL (ref 8.7–10.4)
CHLORIDE SERPL-SCNC: 108 MMOL/L (ref 98–112)
CO2 SERPL-SCNC: 27 MMOL/L (ref 21–32)
CREAT BLD-MCNC: 0.99 MG/DL
EGFRCR SERPLBLD CKD-EPI 2021: 55 ML/MIN/1.73M2 (ref 60–?)
EOSINOPHIL # BLD AUTO: 0.07 X10(3) UL (ref 0–0.7)
EOSINOPHIL NFR BLD AUTO: 0.7 %
ERYTHROCYTE [DISTWIDTH] IN BLOOD BY AUTOMATED COUNT: 13.9 %
FASTING STATUS PATIENT QL REPORTED: YES
GLUCOSE BLD-MCNC: 86 MG/DL (ref 70–99)
HCT VFR BLD AUTO: 33 %
HGB BLD-MCNC: 10.6 G/DL
IMM GRANULOCYTES # BLD AUTO: 0.14 X10(3) UL (ref 0–1)
IMM GRANULOCYTES NFR BLD: 1.3 %
LYMPHOCYTES # BLD AUTO: 1.94 X10(3) UL (ref 1–4)
LYMPHOCYTES NFR BLD AUTO: 18.3 %
MCH RBC QN AUTO: 29.3 PG (ref 26–34)
MCHC RBC AUTO-ENTMCNC: 32.1 G/DL (ref 31–37)
MCV RBC AUTO: 91.2 FL
MONOCYTES # BLD AUTO: 0.81 X10(3) UL (ref 0.1–1)
MONOCYTES NFR BLD AUTO: 7.6 %
NEUTROPHILS # BLD AUTO: 7.62 X10 (3) UL (ref 1.5–7.7)
NEUTROPHILS # BLD AUTO: 7.62 X10(3) UL (ref 1.5–7.7)
NEUTROPHILS NFR BLD AUTO: 71.7 %
OSMOLALITY SERPL CALC.SUM OF ELEC: 300 MOSM/KG (ref 275–295)
PLATELET # BLD AUTO: 159 10(3)UL (ref 150–450)
POTASSIUM SERPL-SCNC: 3.6 MMOL/L (ref 3.5–5.1)
RBC # BLD AUTO: 3.62 X10(6)UL
SODIUM SERPL-SCNC: 142 MMOL/L (ref 136–145)
WBC # BLD AUTO: 10.6 X10(3) UL (ref 4–11)

## 2024-10-07 PROCEDURE — 36415 COLL VENOUS BLD VENIPUNCTURE: CPT

## 2024-10-07 PROCEDURE — 85025 COMPLETE CBC W/AUTO DIFF WBC: CPT

## 2024-10-07 PROCEDURE — 80048 BASIC METABOLIC PNL TOTAL CA: CPT

## 2024-10-07 NOTE — PROGRESS NOTES
Subjective:   Patient ID: Jinny Ocampo is a 89 year old female.    HPI  Patient presents for follow up of hospital admission for TAVR on 9/26/24  Tolerated procedure well, no complications  She has had outpatient follow up with cardiology 3 days ago  Will be rechecking labs as she had anemia at discharge  Repeat EKG was okay   Otherwise no changes and advised to follow up in 6-8 weeks with Dr. Saini    Since discharge she still feels tired but not as much as before  She is slowly coming back around to herself  She has been walking more  Wants to get back to gym at [facility] where she lives - she has done well with chair exercises and walking in the past    Sleeping well  Mood is good  Appetite at baseline    Denies chest pain, shortness of breath, palpitations, edema      History/Other:   Review of Systems   Constitutional:  Positive for fatigue. Negative for chills and fever.   HENT:  Negative for congestion, ear pain, rhinorrhea and sore throat.    Eyes:  Negative for visual disturbance.   Respiratory:  Negative for cough, shortness of breath and wheezing.    Cardiovascular:  Negative for chest pain, palpitations and leg swelling.   Gastrointestinal:  Negative for abdominal pain, diarrhea, nausea and vomiting.   Genitourinary:  Negative for dysuria, frequency and hematuria.   Musculoskeletal:  Negative for arthralgias, gait problem and myalgias.   Skin:  Negative for rash.   Neurological:  Negative for weakness, light-headedness and headaches.   Hematological:  Negative for adenopathy.   Psychiatric/Behavioral:  Negative for dysphoric mood. The patient is not nervous/anxious.      Current Outpatient Medications   Medication Sig Dispense Refill    aspirin 81 MG Oral Chew Tab Chew 1 tablet (81 mg total) by mouth daily. 30 tablet 11    hydroCHLOROthiazide 25 MG Oral Tab Take 2 tablets (50 mg total) by mouth daily. 180 tablet 0    ATORVASTATIN 40 MG Oral Tab TAKE 1 TABLET(40 MG) BY MOUTH EVERY NIGHT 90 tablet 3     latanoprost 0.005 % Ophthalmic Solution 1 drop nightly. AT BEDTIME      ibuprofen 200 MG Oral Tab Take 1 tablet (200 mg total) by mouth every 6 (six) hours as needed for Pain.      acetaminophen 500 MG Oral Tab Take 1 tablet (500 mg total) by mouth.       Allergies:  Allergies   Allergen Reactions    Amlodipine SWELLING     And constipation    Amoxicillin SWELLING     Swelling of tongue    Antihistamines, Loratadine-Type OTHER (SEE COMMENTS)     Cotton head sensation    Codeine OTHER (SEE COMMENTS)     Derivatives-stomach upset      Demerol      TABS-stomach upset      Lisinopril OTHER (SEE COMMENTS)     Tickle in throat    Zetia [Ezetimibe] DIARRHEA     TABS       Objective:   Physical Exam  Vitals and nursing note reviewed.   Constitutional:       Appearance: She is well-developed.   HENT:      Head: Normocephalic and atraumatic.   Eyes:      Conjunctiva/sclera: Conjunctivae normal.      Pupils: Pupils are equal, round, and reactive to light.   Cardiovascular:      Rate and Rhythm: Normal rate and regular rhythm.      Pulses: Normal pulses.      Heart sounds: Murmur heard.   Pulmonary:      Effort: Pulmonary effort is normal.      Breath sounds: Normal breath sounds. No wheezing or rales.   Musculoskeletal:      Cervical back: Normal range of motion and neck supple.      Right lower le+ Edema present.      Left lower le+ Edema present.   Neurological:      Mental Status: She is alert and oriented to person, place, and time.   Psychiatric:         Mood and Affect: Mood normal.         Behavior: Behavior normal.         Thought Content: Thought content normal.         Judgment: Judgment normal.         Assessment & Plan:   1. Nonrheumatic aortic valve stenosis  2. Status post transcatheter aortic valve replacement (TAVR) using bioprosthesis  Clinically improving since her surgery. She is getting more activity and her energy is coming back. Still not quite at baseline. She is going to have labs per cardiology  done today. She is open to cardiac rehab but will need closer location to her home, otherwise this is on hold for now. She will be following up with cardiology in 6-8 weeks. Will monitor and she should contact the office with any change/worsening in symptoms. Parking placard form completed.    3. Essential hypertension  Well controlled. Cpm.

## 2024-10-29 ENCOUNTER — HOSPITAL ENCOUNTER (OUTPATIENT)
Dept: CV DIAGNOSTICS | Facility: HOSPITAL | Age: 89
Discharge: HOME | End: 2024-10-29
Attending: INTERNAL MEDICINE
Payer: MEDICARE

## 2024-10-29 ENCOUNTER — HOSPITAL ENCOUNTER (OUTPATIENT)
Dept: CARDIOLOGY CLINIC | Facility: HOSPITAL | Age: 89
Discharge: HOME | End: 2024-10-29
Attending: INTERNAL MEDICINE
Payer: MEDICARE

## 2024-10-29 VITALS
SYSTOLIC BLOOD PRESSURE: 155 MMHG | OXYGEN SATURATION: 100 % | HEART RATE: 62 BPM | DIASTOLIC BLOOD PRESSURE: 54 MMHG | RESPIRATION RATE: 16 BRPM

## 2024-10-29 DIAGNOSIS — I35.0 AORTIC STENOSIS: ICD-10-CM

## 2024-10-29 DIAGNOSIS — Z95.2 S/P TAVR (TRANSCATHETER AORTIC VALVE REPLACEMENT): Primary | ICD-10-CM

## 2024-10-29 PROCEDURE — 99213 OFFICE O/P EST LOW 20 MIN: CPT | Performed by: NURSE PRACTITIONER

## 2024-10-29 PROCEDURE — 93306 TTE W/DOPPLER COMPLETE: CPT | Performed by: INTERNAL MEDICINE

## 2024-10-29 NOTE — PROGRESS NOTES
[facility]  TAVR Clinic Note    Subjective:   Patient presents for 1 month postop TAVR APN visit, accompanied by her son.   She  underwent  TF TAVR with 23mm Ultra Resila Valve on  9/26/24 d/t severe, symptomatic aortic stenosis.  Since hospital discharge, she notices improvement in her breathing and energy level since the procedure.  Her only complaint has been some reflux since starting the aspirin.  She has a history of being on Prevacid, but it was stopped when her GERD resolved.  She stopped her aspirin on her own today, due to reflux symptoms.  She currently denies: CP, SOB, fatigue, dizziness, palpitations, or wound issues at present.  She further denies any bleeding issues.      Review of Systems   Constitutional: Negative.   HENT: Negative.     Eyes: Negative.    Cardiovascular: Negative.    Respiratory: Negative.     Endocrine: Negative.    Skin: Negative.    Musculoskeletal: Negative.    Gastrointestinal:  Positive for heartburn.   Genitourinary: Negative.    Neurological: Negative.    Psychiatric/Behavioral: Negative.         Objective:   /54 (BP Location: Left arm)   Pulse 62   Resp 16   SpO2 100%   Telemetry-NSR    Physical Exam:   General: AAO, NAD  HEENT: PERRL, MMM  Neck: No JVD  Cardiac: RRR, S1, S2, no murmur or rub noted  Lungs:  CTA bilat  Abdomen: soft, NT, ND, BS +  Extremities: warm, dry, no edema noted  Neurologic: AAO x 3  Skin: groin incision CDI, well healed      Laboratory/Data:  Echo: 9/27/24:  1. Left ventricle: The cavity size was normal. Wall thickness was mildly      increased. Systolic function was normal. The estimated ejection fraction      was 60-65%, by visual assessment. No diagnostic evidence for regional      wall motion abnormalities. Unable to assess LV diastolic function.   2. Right ventricle: The cavity size was normal. Systolic function was      normal.   3. Aortic valve: A 23mm Chelsy 3 prosthesis is present. The prosthesis had a      normal range of  motion. The sewing ring appeared normal, had no rocking      motion, and showed no evidence of dehiscence. There was no significant      regurgitation. There was trivial perivalvular regurgitation. The peak      systolic velocity was 2.23m/sec. The mean systolic gradient was 10mm Hg.      The valve area (VTI) was 2.65cm^2. The valve area (VTI) index was      1.52cm^2/m^2.   4. Pericardium, extracardiac: There was no pericardial effusion.   Impressions:  This study is compared with previous dated 08/29/2024:   Compared to the previous study, the TAVR is new.       Labs:    Lab Results   Component Value Date    GLU 86 10/07/2024    BUN 31 (H) 10/07/2024    CREATSERUM 0.99 10/07/2024    BUNCREA 30.6 (H) 06/20/2024    ANIONGAP 7 10/07/2024    GFRAA 54 (L) 06/02/2022    GFRNAA 47 (L) 06/02/2022    CA 10.1 10/07/2024     10/07/2024    K 3.6 10/07/2024     10/07/2024    CO2 27.0 10/07/2024    OSMOCALC 300 (H) 10/07/2024     Lab Results   Component Value Date    WBC 10.6 10/07/2024    RBC 3.62 (L) 10/07/2024    HGB 10.6 (L) 10/07/2024    HCT 33.0 (L) 10/07/2024    MCV 91.2 10/07/2024    MCH 29.3 10/07/2024    MCHC 32.1 10/07/2024    RDW 13.9 10/07/2024    .0 10/07/2024       Lab Results   Component Value Date    INR 1.06 09/27/2024    INR 1.16 09/26/2024       Medications:  Current Outpatient Medications   Medication Sig Dispense Refill    hydroCHLOROthiazide 25 MG Oral Tab Take 2 tablets (50 mg total) by mouth daily. 180 tablet 0    ATORVASTATIN 40 MG Oral Tab TAKE 1 TABLET(40 MG) BY MOUTH EVERY NIGHT 90 tablet 3    latanoprost 0.005 % Ophthalmic Solution 1 drop nightly. AT BEDTIME      ibuprofen 200 MG Oral Tab Take 1 tablet (200 mg total) by mouth every 6 (six) hours as needed for Pain.      acetaminophen 500 MG Oral Tab Take 1 tablet (500 mg total) by mouth.      aspirin 81 MG Oral Chew Tab Chew 1 tablet (81 mg total) by mouth daily. (Patient not taking: Reported on 10/29/2024) 30 tablet 11        Assessment:  S/p TF TAVR with 23 mm S3 ultra Resilia valve on 9/26/2024 due to severe, symptomatic aortic stenosis  Hypertension -slightly elevated on exam today, patient states has been stable at home.  Hyperlipidemia  PVD  OA  Anemia      Plan:    Complete TVT KCCQ- results reviewed   Echo today   Increase acitivity as tolerated   F/U with primary cardiologist as directed -Dr. Saini  Advised to resume aspirin 81 mg daily, if no signs of bleeding.  Advised for her to resume Prevacid for her GERD if that is helped in the past.  Advised if continues with reflux symptoms, or notices any signs of bleeding to notify her cardiologist/PCP.  Educated her on the importance of antiplatelet therapy in the immediate post TAVR period.   F/U in TAVR clinic in 5 months with echo, or sooner if clinically indicated    I have spent 20 minutes with this patient with > 50% of my time spent in education, coordination, and counseling related to their care. We specifically discussed low NA, heart healthy diet, importance of continued daily exercise, SBE prophylaxis with antibiotics for dental/surgical procedures, compliance with medications, and f/u with specialists as directed.    Beulah Iqbal, APRN  10/29/2024   [id number]

## 2024-12-06 DIAGNOSIS — I10 ESSENTIAL HYPERTENSION: ICD-10-CM

## 2024-12-06 RX ORDER — HYDROCHLOROTHIAZIDE 25 MG/1
50 TABLET ORAL DAILY
Qty: 180 TABLET | Refills: 0 | Status: SHIPPED | OUTPATIENT
Start: 2024-12-06

## 2024-12-20 ENCOUNTER — OFFICE VISIT (OUTPATIENT)
Dept: FAMILY MEDICINE CLINIC | Facility: CLINIC | Age: 89
End: 2024-12-20
Payer: MEDICARE

## 2024-12-20 VITALS
RESPIRATION RATE: 16 BRPM | OXYGEN SATURATION: 99 % | BODY MASS INDEX: 32.85 KG/M2 | HEART RATE: 54 BPM | DIASTOLIC BLOOD PRESSURE: 60 MMHG | WEIGHT: 174 LBS | HEIGHT: 61 IN | SYSTOLIC BLOOD PRESSURE: 136 MMHG

## 2024-12-20 DIAGNOSIS — M17.11 PRIMARY OSTEOARTHRITIS OF RIGHT KNEE: ICD-10-CM

## 2024-12-20 DIAGNOSIS — I83.893 VARICOSE VEINS OF BOTH LEGS WITH EDEMA: ICD-10-CM

## 2024-12-20 DIAGNOSIS — I87.2 VENOUS INSUFFICIENCY: ICD-10-CM

## 2024-12-20 DIAGNOSIS — E78.00 PURE HYPERCHOLESTEROLEMIA: ICD-10-CM

## 2024-12-20 DIAGNOSIS — Z00.00 ENCOUNTER FOR ANNUAL HEALTH EXAMINATION: Primary | ICD-10-CM

## 2024-12-20 DIAGNOSIS — Z95.3 STATUS POST TRANSCATHETER AORTIC VALVE REPLACEMENT (TAVR) USING BIOPROSTHESIS: ICD-10-CM

## 2024-12-20 DIAGNOSIS — I73.9 CLAUDICATION (HCC): ICD-10-CM

## 2024-12-20 DIAGNOSIS — R35.0 URINARY FREQUENCY: ICD-10-CM

## 2024-12-20 DIAGNOSIS — R45.89 DEPRESSED MOOD: ICD-10-CM

## 2024-12-20 DIAGNOSIS — E87.6 HYPOKALEMIA DUE TO EXCESSIVE RENAL LOSS OF POTASSIUM: ICD-10-CM

## 2024-12-20 DIAGNOSIS — I65.23 ASYMPTOMATIC CAROTID ARTERY STENOSIS, BILATERAL: ICD-10-CM

## 2024-12-20 DIAGNOSIS — H25.013 CORTICAL AGE-RELATED CATARACT OF BOTH EYES: ICD-10-CM

## 2024-12-20 DIAGNOSIS — I10 ESSENTIAL HYPERTENSION: ICD-10-CM

## 2024-12-20 DIAGNOSIS — I35.0 NONRHEUMATIC AORTIC VALVE STENOSIS: ICD-10-CM

## 2024-12-20 LAB
APPEARANCE: CLEAR
BILIRUBIN: NEGATIVE
GLUCOSE (URINE DIPSTICK): NEGATIVE MG/DL
KETONES (URINE DIPSTICK): NEGATIVE MG/DL
MULTISTIX LOT#: ABNORMAL NUMERIC
NITRITE, URINE: NEGATIVE
OCCULT BLOOD: NEGATIVE
PH, URINE: 5.5 (ref 4.5–8)
PROTEIN (URINE DIPSTICK): NEGATIVE MG/DL
SPECIFIC GRAVITY: 1.01 (ref 1–1.03)
URINE-COLOR: YELLOW
UROBILINOGEN,SEMI-QN: 0.2 MG/DL (ref 0–1.9)

## 2024-12-20 PROCEDURE — 87077 CULTURE AEROBIC IDENTIFY: CPT | Performed by: PHYSICIAN ASSISTANT

## 2024-12-20 PROCEDURE — 87186 SC STD MICRODIL/AGAR DIL: CPT | Performed by: PHYSICIAN ASSISTANT

## 2024-12-20 PROCEDURE — 87086 URINE CULTURE/COLONY COUNT: CPT | Performed by: PHYSICIAN ASSISTANT

## 2024-12-20 PROCEDURE — G0439 PPPS, SUBSEQ VISIT: HCPCS | Performed by: PHYSICIAN ASSISTANT

## 2024-12-20 PROCEDURE — 81003 URINALYSIS AUTO W/O SCOPE: CPT | Performed by: PHYSICIAN ASSISTANT

## 2024-12-20 RX ORDER — POTASSIUM CHLORIDE 1500 MG/1
TABLET, EXTENDED RELEASE ORAL
COMMUNITY
Start: 2024-06-24

## 2024-12-20 NOTE — PROGRESS NOTES
Subjective:   Jinny Ocampo is a 89 year old female who presents for a Medicare Subsequent Annual Wellness visit (Pt already had Initial Annual Wellness) and scheduled follow up of multiple significant but stable problems.   Patient presents today requesting physical exam.      Diet: well balanced  Exercise: walking as tolerated  Tobacco use: denies  Drug use: denies  Alcohol use: rarely, holidays  Marital status:     Health maintenance:  Pap/Hpv: not needed  Mammogram: 9/10/19 benign  Performs SBEs: encouraged  Dexa scan: no records  Colonoscopy: not recommended for age  Discussed age appropriate vaccines    H/o chronic knee pain  Sees ortho and gets injections every 3 months  Knees feel better today  However, her skin of her legs looks purplish and burning sensation below knees  Ortho was concerned about edema or circulatory issues    She is having increased urinary frequency  Also experiencing pelvic pressure  She wonders about UTI      History/Other:   Fall Risk Assessment:   She has been screened for Falls and is High Risk. Fall Prevention information provided to patient in After Visit Summary.    Do you feel unsteady when standing or walking?: (Patient-Rptd) No  Do you worry about falling?: (Patient-Rptd) No  Have you fallen in the past year?: (Patient-Rptd) Yes  How many times have you fallen?: (Patient-Rptd) (P) 1  Were you injured?: (Patient-Rptd) (P) No     Cognitive Assessment:   She had a completely normal cognitive assessment - see flowsheet entries     Functional Ability/Status:   Jinny Oacmpo has some abnormal functions as listed below:  She has Vision problems based on screening of functional status.       Depression Screening (PHQ):  PHQ-2 SCORE: 0  , done 12/9/2024             Advanced Directives:   She does have a Living Will but we do NOT have it on file in Epic.    She does have a POA but we do NOT have it on file in Epic.    Discussed Advance Care Planning with patient (and  family/surrogate if present). Standard forms made available to patient in After Visit Summary.      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    Current Medications:  Outpatient Medications Marked as Taking for the 12/20/24 encounter (Office Visit) with Ashlee Trejo PA-C   Medication Sig    HYDROCHLOROTHIAZIDE 25 MG Oral Tab TAKE 2 TABLETS(50 MG) BY MOUTH DAILY    aspirin 81 MG Oral Chew Tab Chew 1 tablet (81 mg total) by mouth daily.    ATORVASTATIN 40 MG Oral Tab TAKE 1 TABLET(40 MG) BY MOUTH EVERY NIGHT    latanoprost 0.005 % Ophthalmic Solution 1 drop nightly. AT BEDTIME    ibuprofen 200 MG Oral Tab Take 1 tablet (200 mg total) by mouth every 6 (six) hours as needed for Pain.    acetaminophen 500 MG Oral Tab Take 1 tablet (500 mg total) by mouth.       Medical History:  She  has a past medical history of Aortic valve calcification (6/25/2015), Arthritis, Atypical ductal hyperplasia of left breast (6/24/2015), Cataracts, bilateral (2015), Fibrocystic breast changes of both breasts (6/24/2015), Other and unspecified hyperlipidemia, Plantar fascial fibromatosis, PONV (postoperative nausea and vomiting), and Unspecified essential hypertension.  Surgical History:  She  has a past surgical history that includes biopsy of breast, incisional (01/01/2007); punc/aspir breast cyst (01/01/2008); femur/knee surg unlisted (01/01/1985); needle biopsy right (2008); OCT, Retina - OS - Left Eye; Breast biopsy (07/22/2015); santana  localization wire 1 site left (cpt=19281) (07/2015); santana biopsy stereo nodule 2 site bilat (cpt=19081/07498) (06/2015); and santana biopsy stereo nodule 1 site right (cpt=19081) (02/2017).   Family History:  Her family history is not on file.  Social History:  She  reports that she has never smoked. She has never used smokeless tobacco. She reports current alcohol use. She reports that she does not use drugs.    Tobacco:  She has never smoked tobacco.    CAGE Alcohol Screen:   CAGE screening score of 0 on 12/9/2024, showing low risk of alcohol abuse.      Patient Care Team:  He Cheema MD as PCP - General (Family Medicine)  Jeny Otero MD as Surgeon (SURGERY, GENERAL)  Nelli Hernandez APN (Nurse Practitioner [facility])    Review of Systems   Constitutional:  Negative for chills, fatigue and fever.   HENT:  Negative for congestion, ear pain, rhinorrhea and sore throat.    Eyes:  Negative for visual disturbance.   Respiratory:  Negative for cough, shortness of breath and wheezing.    Cardiovascular:  Positive for leg swelling. Negative for chest pain and palpitations.   Gastrointestinal:  Negative for abdominal pain, diarrhea, nausea and vomiting.   Genitourinary:  Positive for dysuria, frequency and urgency. Negative for hematuria.   Musculoskeletal:  Negative for arthralgias, gait problem and myalgias.        Pain in lower legs   Skin:  Negative for rash.   Neurological:  Negative for weakness, light-headedness and headaches.   Hematological:  Negative for adenopathy.   Psychiatric/Behavioral:  Negative for dysphoric mood. The patient is not nervous/anxious.           Objective:   Physical Exam  Vitals and nursing note reviewed.   Constitutional:       General: She is not in acute distress.     Appearance: Normal appearance. She is well-developed.   HENT:      Head: Normocephalic and atraumatic.      Right Ear: Tympanic membrane, ear canal and external ear normal.      Left Ear: Tympanic membrane, ear  canal and external ear normal.      Nose: Nose normal.      Mouth/Throat:      Mouth: Mucous membranes are moist.   Eyes:      Extraocular Movements: Extraocular movements intact.      Conjunctiva/sclera: Conjunctivae normal.      Pupils: Pupils are equal, round, and reactive to light.   Neck:      Thyroid: No thyromegaly.   Cardiovascular:      Rate and Rhythm: Normal rate and regular rhythm.      Pulses: Normal pulses.      Heart sounds: Normal heart sounds.      Comments: Multiple bulging varicosities bilateral lower legs, venous stasis skin changes  Pulmonary:      Effort: Pulmonary effort is normal.      Breath sounds: Normal breath sounds. No wheezing or rales.   Abdominal:      General: Bowel sounds are normal. There is no distension.      Palpations: Abdomen is soft. There is no mass.      Tenderness: There is no abdominal tenderness.   Musculoskeletal:         General: No tenderness. Normal range of motion.      Cervical back: Normal range of motion and neck supple.   Lymphadenopathy:      Cervical: No cervical adenopathy.   Skin:     General: Skin is warm and dry.      Findings: No rash.   Neurological:      General: No focal deficit present.      Mental Status: She is alert and oriented to person, place, and time.   Psychiatric:         Mood and Affect: Mood normal.         Behavior: Behavior normal.            Ht 5' 1\" (1.549 m)   Wt 174 lb (78.9 kg)   BMI 32.88 kg/m²  Estimated body mass index is 32.88 kg/m² as calculated from the following:    Height as of this encounter: 5' 1\" (1.549 m).    Weight as of this encounter: 174 lb (78.9 kg).    Medicare Hearing Assessment:   Hearing Screening    Screening Method: Whisper Test  Whisper Test Result: Pass         Visual Acuity:   Right Eye Visual Acuity: Corrected Right Eye Chart Acuity: 20/50   Left Eye Visual Acuity: Corrected Left Eye Chart Acuity: 20/70   Both Eyes Visual Acuity: Corrected Both Eyes Chart Acuity: 20/50   Able To Tolerate Visual Acuity:  Yes        Assessment & Plan:   Jinny Ocampo is a 89 year old female who presents for a Medicare Assessment.     1. Encounter for annual health examination  Patient here with active issues below.  Health maintenance issues discussed. Encouraged routine vaccines.  Advised healthy diet and regular exercise.  Annual physicals.    2. Varicose veins of both legs with edema  Recommend low sodium diet, leg elevation, compression stockings if tolerable. Will refer to vascular surgeon for ongoing management, patient has seen Dr. Otero for tx in the past.   - Vascular Surgery - In Network    3. Venous insufficiency  Same plan as #2.   - Vascular Surgery - In Network    4. Claudication (HCC)  As above. May also consider arterial doppler if appropriate.   - Vascular Surgery - In Network    5. Urinary frequency  Check urine studies and follow up pending results. Treat accordingly. Increase water intake in the meantime.   - Urine Culture, Routine [E]; Future  - Urine Dip, auto without Micro  - Urine Culture, Routine [E]  - ciprofloxacin 500 MG Oral Tab; Take 1 tablet (500 mg total) by mouth 2 (two) times daily for 7 days.  Dispense: 14 tablet; Refill: 0    6. Nonrheumatic aortic valve stenosis  7. 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 Continue present management and ongoing follow up with cardiology.     8. Asymptomatic carotid artery stenosis, bilateral  Continue to follow up with cardiology and optimize risk factors.     9. Essential hypertension  Controlled. Continue same medications. Follow up in 6 months.     10. Pure hypercholesterolemia  Controlled. Continue same dose of statin, healthy diet and regular exercise. Labs per cardiology.     11. Depressed mood  Stable. Monitor.     12. Primary osteoarthritis of right knee  Ongoing pain in knees. Continue to follow up with ortho for management. Injections have been helping.     13. Hypokalemia due to excessive renal  loss of potassium  Continue supplements and monitoring.   - Potassium Chloride ER 20 MEQ Oral Tab CR; potassium chloride ER 20 mEq tablet,extended release, [RxNorm: 866817] (Patient not taking: Reported on 12/20/2024)    14. Cortical age-related cataract of both eyes  Stable. Monitor.     The patient indicates understanding of these issues and agrees to the plan.  Reinforced healthy diet, lifestyle, and exercise.      No follow-ups on file.     Lillian Trejo PA-C, 12/20/2024     Supplementary Documentation:   General Health:  In the past six months, have you lost more than 10 pounds without trying?: (Patient-Rptd) 2 - No  Has your appetite been poor?: (Patient-Rptd) No  Type of Diet: (Patient-Rptd) Balanced;Low Carb  How does the patient maintain a good energy level?: (Patient-Rptd) Appropriate Exercise;Stretching  How would you describe your daily physical activity?: (Patient-Rptd) Light  How would you describe your current health state?: (Patient-Rptd) Good  How do you maintain positive mental well-being?: (Patient-Rptd) Social Interaction;Games;Visiting Family  On a scale of 0 to 10, with 0 being no pain and 10 being severe pain, what is your pain level?: (Patient-Rptd) 6 - (Moderate)  In the past six months, have you experienced urine leakage?: (Patient-Rptd) 0-No  At any time do you feel concerned for the safety/well-being of yourself and/or your children, in your home or elsewhere?: (Patient-Rptd) No  Have you had any immunizations at another office such as Influenza, Hepatitis B, Tetanus, or Pneumococcal?: (Patient-Rptd) Yes    Health Maintenance   Topic Date Due    Zoster Vaccines (2 of 3) 08/10/2012    COVID-19 Vaccine (8 - 2024-25 season) 09/01/2024    Influenza Vaccine (1) 10/01/2024    Annual Physical  12/06/2024    Annual Depression Screening  Completed    Fall Risk Screening (Annual)  Completed    Pneumococcal Vaccine: 65+ Years  Completed

## 2024-12-25 RX ORDER — CIPROFLOXACIN 500 MG/1
500 TABLET, FILM COATED ORAL 2 TIMES DAILY
Qty: 14 TABLET | Refills: 0 | Status: SHIPPED | OUTPATIENT
Start: 2024-12-25 | End: 2024-12-30 | Stop reason: ALTCHOICE

## 2024-12-30 ENCOUNTER — TELEPHONE (OUTPATIENT)
Dept: FAMILY MEDICINE CLINIC | Facility: CLINIC | Age: 89
End: 2024-12-30

## 2024-12-30 RX ORDER — CEPHALEXIN 500 MG/1
500 CAPSULE ORAL 2 TIMES DAILY
Qty: 14 CAPSULE | Refills: 0 | Status: SHIPPED | OUTPATIENT
Start: 2024-12-30 | End: 2025-01-06

## 2024-12-30 NOTE — TELEPHONE ENCOUNTER
S/w pt  Worried about potential side effects of cipro d/t package insert she is reading  Also does not like the size of the pill, sts if pills are too big \"makes me vomit\"  Worried about possible effects on tendons  Sts she has trouble with her legs and knees  Does not want to take this rx  I reviewed urine cx with sensitivity results w her in lay terrms. Explained rationale.    Explained most meds will have some degree of side effects but that I will forward    Please advise if you can switch to something else?  Thx

## 2025-03-11 DIAGNOSIS — I10 ESSENTIAL HYPERTENSION: ICD-10-CM

## 2025-03-11 RX ORDER — HYDROCHLOROTHIAZIDE 25 MG/1
50 TABLET ORAL DAILY
Qty: 180 TABLET | Refills: 0 | Status: SHIPPED | OUTPATIENT
Start: 2025-03-11

## 2025-03-18 ENCOUNTER — HOSPITAL ENCOUNTER (OUTPATIENT)
Dept: CARDIOLOGY CLINIC | Facility: HOSPITAL | Age: 89
Discharge: HOME | End: 2025-03-18
Attending: INTERNAL MEDICINE
Payer: MEDICARE

## 2025-03-18 VITALS — HEART RATE: 62 BPM | OXYGEN SATURATION: 100 % | DIASTOLIC BLOOD PRESSURE: 61 MMHG | SYSTOLIC BLOOD PRESSURE: 147 MMHG

## 2025-03-18 DIAGNOSIS — Z95.2 S/P TAVR (TRANSCATHETER AORTIC VALVE REPLACEMENT): Primary | ICD-10-CM

## 2025-03-18 PROCEDURE — 99213 OFFICE O/P EST LOW 20 MIN: CPT | Performed by: NURSE PRACTITIONER

## 2025-03-18 NOTE — PROGRESS NOTES
[facility]  TAVR Clinic Note    Subjective:   Patient presents for 6-month postop TAVR APN visit.  She underwent TF TAVR 23 mm S3 ultra Resilia valve on 9/26/2024 due to severe, symptomatic aortic stenosis.  Since our last visit in October, she continues to do well from a cardiac standpoint.  Her main limitation is secondary to knee osteoarthritis, she ambulates with cane assist.  She gets injections which gave her approximately 2 months of relief, and has 1 later this week.  She currently denies: CP, SOB, fatigue, dizziness, palpitations, or wound issues at present.  She does get some lower extremity swelling, has a history of varicose veins-but is unable to wear compression hose.      Review of Systems   Constitutional: Negative.   HENT: Negative.     Eyes: Negative.    Cardiovascular:  Positive for leg swelling.   Respiratory: Negative.     Endocrine: Negative.    Skin: Negative.    Musculoskeletal:  Positive for arthritis, joint pain and joint swelling.   Gastrointestinal: Negative.    Genitourinary: Negative.    Neurological: Negative.    Psychiatric/Behavioral: Negative.         Objective:   /61   Pulse 62   SpO2 100%       Physical Exam:   General: AAO, NAD  HEENT: PERRL, MMM  Neck: No JVD  Cardiac: RRR, S1, S2, no murmur or rub noted  Lungs:  CTA bilat  Abdomen: soft, NT, ND, BS +  Extremities: warm, dry, trace-1+ edema/ankles  Neurologic: AAO x 3  Skin: groin incision CDI, well healed      Laboratory/Data:  Echo: 10/29/2024  1. Left ventricle: The cavity size was normal. Wall thickness was normal.      Systolic function was normal. The estimated ejection fraction was 60-65%,      by visual assessment. No diagnostic evidence for regional wall motion      abnormalities. Left ventricular diastolic function parameters were normal      for the patient's age.   2. Left atrium: The left atrial volume was normal.   3. Aortic valve: Mcnamara ANA S3 23mm (TAVR) bioprosthesis was present.      The peak  systolic velocity was 2.08m/sec.   4. Mitral valve: There was mild regurgitation.   5. Pulmonary arteries: Systolic pressure was within the normal range,      estimated to be 26mm Hg. Estimated pulmonary artery diastolic pressure      was 6mm Hg.   Impressions:  This study is compared with previous dated 9/27/2024: No   significant change since prior study.         Labs:    Lab Results   Component Value Date    GLU 86 10/07/2024    BUN 31 (H) 10/07/2024    CREATSERUM 0.99 10/07/2024    BUNCREA 30.6 (H) 06/20/2024    ANIONGAP 7 10/07/2024    GFRAA 54 (L) 06/02/2022    GFRNAA 47 (L) 06/02/2022    CA 10.1 10/07/2024     10/07/2024    K 3.6 10/07/2024     10/07/2024    CO2 27.0 10/07/2024    OSMOCALC 300 (H) 10/07/2024     Lab Results   Component Value Date    WBC 10.6 10/07/2024    RBC 3.62 (L) 10/07/2024    HGB 10.6 (L) 10/07/2024    HCT 33.0 (L) 10/07/2024    MCV 91.2 10/07/2024    MCH 29.3 10/07/2024    MCHC 32.1 10/07/2024    RDW 13.9 10/07/2024    .0 10/07/2024       Lab Results   Component Value Date    INR 1.06 09/27/2024    INR 1.16 09/26/2024       Medications:  Current Outpatient Medications   Medication Sig Dispense Refill    HYDROCHLOROTHIAZIDE 25 MG Oral Tab TAKE 2 TABLETS(50 MG) BY MOUTH DAILY 180 tablet 0    Potassium Chloride ER 20 MEQ Oral Tab CR potassium chloride ER 20 mEq tablet,extended release, [RxNorm: 777610] (Patient not taking: Reported on 12/20/2024)      aspirin 81 MG Oral Chew Tab Chew 1 tablet (81 mg total) by mouth daily. 30 tablet 11    ATORVASTATIN 40 MG Oral Tab TAKE 1 TABLET(40 MG) BY MOUTH EVERY NIGHT 90 tablet 3    latanoprost 0.005 % Ophthalmic Solution 1 drop nightly. AT BEDTIME      ibuprofen 200 MG Oral Tab Take 1 tablet (200 mg total) by mouth every 6 (six) hours as needed for Pain.      acetaminophen 500 MG Oral Tab Take 1 tablet (500 mg total) by mouth.         Assessment:  S/p TF TAVR with 23 mm S3 ultra Resilia valve on 9/26/2024 due to severe,  symptomatic aortic stenosis  Hypertension   Hyperlipidemia  PVD  OA -knees primarily  Anemia    Plan:    Complete TVT KCCQ- results reviewed   Echo today   Increase acitivity as tolerated -has knee injection planned for later this week   F/U with primary cardiologist as directed - Dr. Saini   F/U in TAVR clinic in 6 months with echo, or sooner if clinically indicated    I have spent 20 minutes with this patient with > 50% of my time spent in education, coordination, and counseling related to their care. We specifically discussed low NA, heart healthy diet, importance of continued daily exercise, SBE prophylaxis with antibiotics for dental/surgical procedures, compliance with medications, and f/u with specialists as directed.    Beulah Iqbal, APRN  3/18/2025   [id number]

## 2025-04-06 ENCOUNTER — APPOINTMENT (OUTPATIENT)
Dept: CT IMAGING | Age: 89
End: 2025-04-06
Attending: PHYSICIAN ASSISTANT
Payer: MEDICARE

## 2025-04-06 ENCOUNTER — APPOINTMENT (OUTPATIENT)
Dept: GENERAL RADIOLOGY | Age: 89
End: 2025-04-06
Payer: MEDICARE

## 2025-04-06 ENCOUNTER — HOSPITAL ENCOUNTER (EMERGENCY)
Age: 89
Discharge: HOME | End: 2025-04-06
Attending: EMERGENCY MEDICINE
Payer: MEDICARE

## 2025-04-06 VITALS
HEART RATE: 79 BPM | WEIGHT: 165 LBS | DIASTOLIC BLOOD PRESSURE: 66 MMHG | OXYGEN SATURATION: 98 % | HEIGHT: 61 IN | RESPIRATION RATE: 16 BRPM | BODY MASS INDEX: 31.15 KG/M2 | SYSTOLIC BLOOD PRESSURE: 118 MMHG | TEMPERATURE: 98 F

## 2025-04-06 DIAGNOSIS — M25.462 EFFUSION OF LEFT KNEE: ICD-10-CM

## 2025-04-06 DIAGNOSIS — S82.002A CLOSED NONDISPLACED FRACTURE OF LEFT PATELLA, UNSPECIFIED FRACTURE MORPHOLOGY, INITIAL ENCOUNTER: Primary | ICD-10-CM

## 2025-04-06 PROCEDURE — 99284 EMERGENCY DEPT VISIT MOD MDM: CPT

## 2025-04-06 PROCEDURE — 73700 CT LOWER EXTREMITY W/O DYE: CPT | Performed by: PHYSICIAN ASSISTANT

## 2025-04-06 PROCEDURE — 73560 X-RAY EXAM OF KNEE 1 OR 2: CPT

## 2025-04-06 RX ORDER — ACETAMINOPHEN 500 MG
500 TABLET ORAL ONCE
Status: COMPLETED | OUTPATIENT
Start: 2025-04-06 | End: 2025-04-06

## 2025-04-06 NOTE — ED PROVIDER NOTES
Patient Seen in: [facility] In [place]      History     Chief Complaint   Patient presents with    Fall     Stated Complaint: fall, left knee    Subjective:   HPI      89-year-old female with past medical history of hyperlipidemia, hypertension, aortic valve calcification who presents to the ER for fall that occurred this morning.  Patient states she was walking to the bathroom when her foot caught on the floor causing her to fall onto her left side and twisted her knee as she fell.  States family was able to help her up within 30 minutes.  Denies head injury, LOC, takes baby aspirin daily but no other blood thinning medication.  Denies any headache or vomiting at this time or injuries elsewhere.  Has not been able to bear weight on her left lower extremity secondary to pain.  No numbness.  She follows with an orthopedist through [facility] for chronic knee pain.  She denies any symptoms prior to her fall including no chest pain, shortness of breath or weakness and states she is otherwise been feeling well.    Objective:     Past Medical History:    Aortic valve calcification    UFCT    Arthritis    right arm    Atypical ductal hyperplasia of left breast    Cataracts, bilateral    small    Fibrocystic breast changes of both breasts    Other and unspecified hyperlipidemia    Plantar fascial fibromatosis    PONV (postoperative nausea and vomiting)    Unspecified essential hypertension              Past Surgical History:   Procedure Laterality Date    Biopsy of breast, incisional  01/01/2007    right-negative    Breast biopsy  07/22/2015    left breast biopsy.     Femur/knee surg unlisted  01/01/1985    Right knee, cartilage    Jenn biopsy stereo nodule 1 site right (cpt=19081)  02/2017    benign-hyalinized duct    Jenn biopsy stereo nodule 2 site bilat (cpt=19081/60517)  06/2015    atypia    Jenn localization wire 1 site left (cpt=19281)  07/2015    atypia    Needle biopsy right  2008    benign.    Oct,  retina - os - left eye      Punc/aspir breast cyst  01/01/2008                Social History     Socioeconomic History    Marital status:    Tobacco Use    Smoking status: Never    Smokeless tobacco: Never   Vaping Use    Vaping status: Never Used   Substance and Sexual Activity    Alcohol use: Yes     Comment: socially only on Holidays    Drug use: No    Sexual activity: Not Currently     Partners: Male   Other Topics Concern    Caffeine Concern Yes     Comment: 3-4 cups daily     Exercise Yes     Comment: 4x a week    Seat Belt Yes     Social Drivers of Health     Food Insecurity: No Food Insecurity (9/26/2024)    Food Insecurity     Food Insecurity: Never true   Transportation Needs: No Transportation Needs (9/26/2024)    Transportation Needs     Lack of Transportation: No   Housing Stability: Low Risk  (9/26/2024)    Housing Stability     Housing Instability: No                  Physical Exam     ED Triage Vitals [04/06/25 1337]   /72   Pulse 85   Resp 18   Temp 98.2 °F (36.8 °C)   Temp src Temporal   SpO2 99 %   O2 Device None (Room air)       Current Vitals:   Vital Signs  BP: 120/72  Pulse: 85  Resp: 18  Temp: 98.2 °F (36.8 °C)  Temp src: Temporal    Oxygen Therapy  SpO2: 99 %  O2 Device: None (Room air)        Physical Exam  GENERAL APPEARANCE:  AxOx4, generally well-appearing, no acute distress.  HEENT:  NC, AT.   NECK:  Supple without lymphadenopathy.  No stiffness or restricted ROM.  RESPIRATORY: Normal rate, no respiratory distress.  EXTREMITIES: Swelling of the anterior bilateral aspect of the left knee.  Limited range of motion secondary to pain.  2+ DP and PT pulses distally.  No tenderness elsewhere in the left lower extremity.  Without cyanosis, clubbing or edema.  NEUROLOGICAL:  Grossly nonfocal. Observed to ambulate with normal gait.  Skin: Normal color and no visible rashes.        ED Course   Labs Reviewed - No data to display           CT KNEE LEFT (WGZ=68651)    Result Date:  4/6/2025  PROCEDURE:  CT KNEE LEFT (CPT=73700)  COMPARISON:  [facility], XR, XR KNEE (1 OR 2 VIEWS), LEFT (CPT=73560), 4/06/2025, 2:02 PM.  INDICATIONS:  fall, left knee  TECHNIQUE:  Multi-planar CT images were created without intravenous contrast. Shaded surface renderings are generated on an independent CT scanner workstation.  Dose reduction techniques were used. Dose information is transmitted to the ACR (American College of Radiology) NRDR (National Radiology Data Registry) which includes the Dose Index Registry  3-D RENDERING:  Additional 3-D rendering was generated by the technologist.  PATIENT STATED HISTORY:(As transcribed by Technologist)  Fall , left knee pain.    FINDINGS:  On lateral view there is slight cortical step-off of the superior patella which may represent a fracture.  Osteoarthritic changes.  Small suprapatellar joint effusion.  Patellar femoral relationship is within normal limits.  Soft tissue edema/hematoma within the anterior subcutaneous fat.            CONCLUSION:  1. Slight cortical step-off the superior aspect of the patella which may represent a fracture.  Correlation to region of pain is recommended. 2. Osteoarthritic changes. 3. Suprapatellar joint effusion.    LOCATION:  [facility]   Dictated by (CST): Omari Hester MD on 4/06/2025 at 3:43 PM     Finalized by (CST): Omari Hester MD on 4/06/2025 at 3:46 PM       XR KNEE (1 OR 2 VIEWS), LEFT (CPT=73560)    Result Date: 4/6/2025  PROCEDURE:  XR KNEE (1 OR 2 VIEWS), LEFT (CPT=73560)  COMPARISON:  [facility], XR, XR KNEE (1 OR 2 VIEWS), LEFT (CPT=73560), 6/30/2020, 10:00 AM.  INDICATIONS:  fall, left knee  PATIENT STATED HISTORY: (As transcribed by Technologist)  Patient fell in her bedroom at 11am and was unable to get up and waited for family to arrive 15 minutes later. Patient states her left leg twisted laterally and has pain to her left lateral knee with bruising. She has pain bending her leg but is able to weight bear.     FINDINGS:  No acute fractures or osseous lesions.  Patellar femoral relationship is within normal limits.  suprapatellar  joint effusion.  Osteoarthritic changes.            CONCLUSION:  1. No acute fractures 2. Osteoarthritic changes. 3. Suprapatellar joint effusion.    LOCATION:  [facility]   Dictated by (CST): Omari Hester MD on 4/06/2025 at 2:28 PM     Finalized by (CST): Omari Hester MD on 4/06/2025 at 2:28 PM             MDM      89-year-old female who presents to the ER for left knee pain from injury that occurred this morning.  Vitals within normal limits.  No other presyncopal or prodromal symptoms prior to fall.  X-ray imaging shows effusion but no other acute bony findings noted.  Due to mechanism and significant swelling and lack of weightbearing of left lower extremity, plan for CT imaging.  CT imaging shows possible patellar fracture.  Discussed case with Dr. Perales who agrees with plan for discharge home, weightbearing as tolerated in knee immobilizer.  Patient tolerated walker and knee immobilizer while going to the bathroom well.  Discussed continued supportive management at home, has family nearby to help if needed.  She will call Ortho office for follow-up soon as possible.  Ready for discharge.  Patient was also seen and evaluated by Dr. Up.         Medical Decision Making      Disposition and Plan     Clinical Impression:  1. Closed nondisplaced fracture of left patella, unspecified fracture morphology, initial encounter    2. Effusion of left knee         Disposition:  There is no disposition on file for this visit.  There is no disposition time on file for this visit.    Follow-up:  Mann Richard MD  [address] [place] [address]  [address] [place] [id number]  [phone]    Follow up            Medications Prescribed:  Current Discharge Medication List              Supplementary Documentation:

## 2025-04-06 NOTE — DISCHARGE INSTRUCTIONS
Remain in knee immobilizer and use walker at home, weightbearing as tolerated. Call to make an appointment for follow-up with orthopedist as soon as possible.  Take Tylenol at home for pain.  Rest, elevate and apply ice pack on the knee as frequently as possible.  Return to the ER for any other new or worsening symptoms.

## 2025-04-06 NOTE — ED INITIAL ASSESSMENT (HPI)
slipped while going from bedroom to the bathroom and landed on her left knee.  was unable to get up and was waiting for her family to come over to help her up. She is unable to bear weight on the left knee now. No loc

## 2025-04-22 ENCOUNTER — LAB ENCOUNTER (OUTPATIENT)
Dept: LAB | Age: 89
End: 2025-04-22
Payer: MEDICARE

## 2025-04-22 DIAGNOSIS — R06.09 DYSPNEA ON EXERTION: ICD-10-CM

## 2025-04-22 DIAGNOSIS — R53.83 FATIGUE: ICD-10-CM

## 2025-04-22 DIAGNOSIS — R94.31 NONSPECIFIC ABNORMAL ELECTROCARDIOGRAM (ECG) (EKG): ICD-10-CM

## 2025-04-22 DIAGNOSIS — Z13.1 SCREENING FOR DIABETES MELLITUS: Primary | ICD-10-CM

## 2025-04-22 DIAGNOSIS — R79.89 HYPOURICEMIA: ICD-10-CM

## 2025-04-22 DIAGNOSIS — I10 ESSENTIAL HYPERTENSION, MALIGNANT: ICD-10-CM

## 2025-04-22 LAB
ANION GAP SERPL CALC-SCNC: 14 MMOL/L (ref 0–18)
BUN BLD-MCNC: 33 MG/DL (ref 9–23)
CALCIUM BLD-MCNC: 9.9 MG/DL (ref 8.7–10.6)
CHLORIDE SERPL-SCNC: 107 MMOL/L (ref 98–112)
CO2 SERPL-SCNC: 26 MMOL/L (ref 21–32)
CREAT BLD-MCNC: 1.27 MG/DL (ref 0.55–1.02)
EGFRCR SERPLBLD CKD-EPI 2021: 40 ML/MIN/1.73M2 (ref 60–?)
ERYTHROCYTE [DISTWIDTH] IN BLOOD BY AUTOMATED COUNT: 14.2 %
EST. AVERAGE GLUCOSE BLD GHB EST-MCNC: 137 MG/DL (ref 68–126)
FASTING STATUS PATIENT QL REPORTED: NO
GLUCOSE BLD-MCNC: 92 MG/DL (ref 70–99)
HBA1C MFR BLD: 6.4 % (ref ?–5.7)
HCT VFR BLD AUTO: 34.7 % (ref 35–48)
HGB BLD-MCNC: 10.8 G/DL (ref 12–16)
MCH RBC QN AUTO: 28.7 PG (ref 26–34)
MCHC RBC AUTO-ENTMCNC: 31.1 G/DL (ref 31–37)
MCV RBC AUTO: 92.3 FL (ref 80–100)
NT-PROBNP SERPL-MCNC: 559 PG/ML (ref ?–450)
OSMOLALITY SERPL CALC.SUM OF ELEC: 311 MOSM/KG (ref 275–295)
PLATELET # BLD AUTO: 211 10(3)UL (ref 150–450)
POTASSIUM SERPL-SCNC: 3.8 MMOL/L (ref 3.5–5.1)
RBC # BLD AUTO: 3.76 X10(6)UL (ref 3.8–5.3)
SODIUM SERPL-SCNC: 147 MMOL/L (ref 136–145)
WBC # BLD AUTO: 9.9 X10(3) UL (ref 4–11)

## 2025-04-22 PROCEDURE — 36415 COLL VENOUS BLD VENIPUNCTURE: CPT

## 2025-04-22 PROCEDURE — 83880 ASSAY OF NATRIURETIC PEPTIDE: CPT

## 2025-04-22 PROCEDURE — 83036 HEMOGLOBIN GLYCOSYLATED A1C: CPT

## 2025-04-22 PROCEDURE — 85027 COMPLETE CBC AUTOMATED: CPT

## 2025-04-22 PROCEDURE — 80048 BASIC METABOLIC PNL TOTAL CA: CPT

## 2025-05-13 ENCOUNTER — HOSPITAL ENCOUNTER (OUTPATIENT)
Age: 89
Discharge: HOME | End: 2025-05-13
Attending: EMERGENCY MEDICINE
Payer: MEDICARE

## 2025-05-13 ENCOUNTER — APPOINTMENT (OUTPATIENT)
Dept: GENERAL RADIOLOGY | Age: 89
End: 2025-05-13
Attending: EMERGENCY MEDICINE
Payer: MEDICARE

## 2025-05-13 VITALS
TEMPERATURE: 98 F | SYSTOLIC BLOOD PRESSURE: 123 MMHG | DIASTOLIC BLOOD PRESSURE: 58 MMHG | HEIGHT: 61 IN | RESPIRATION RATE: 16 BRPM | WEIGHT: 169 LBS | HEART RATE: 74 BPM | BODY MASS INDEX: 31.91 KG/M2 | OXYGEN SATURATION: 100 %

## 2025-05-13 DIAGNOSIS — S70.01XA CONTUSION OF RIGHT HIP, INITIAL ENCOUNTER: ICD-10-CM

## 2025-05-13 DIAGNOSIS — S40.011A CONTUSION OF RIGHT SHOULDER, INITIAL ENCOUNTER: Primary | ICD-10-CM

## 2025-05-13 PROCEDURE — 73030 X-RAY EXAM OF SHOULDER: CPT | Performed by: EMERGENCY MEDICINE

## 2025-05-13 PROCEDURE — 99214 OFFICE O/P EST MOD 30 MIN: CPT

## 2025-05-13 PROCEDURE — 99213 OFFICE O/P EST LOW 20 MIN: CPT

## 2025-05-13 RX ORDER — LOSARTAN POTASSIUM 25 MG/1
TABLET ORAL
COMMUNITY
Start: 2025-03-28

## 2025-05-13 NOTE — ED PROVIDER NOTES
Patient Seen in: [facility]      History     Chief Complaint   Patient presents with    Fall     Stated Complaint: fall/right shoulder    Subjective:   HPI  History of Present Illness            Patient is a an 89-year-old female who states she had a fall on Sunday after a door opened and she lost her balance falling backwards.  Patient complains of pain to the right hip as well as the right shoulder.  Patient denies hitting her head, no loss conscious, no nausea vomiting, no neck pain.  Patient has limited range of motion of the right shoulder secondary to pain.  No chest pain, shortness of breath, not on blood thinners.  Patient is able to walk without difficulty mild discomfort lateral right hip.  Remainder of review of systems negative.      Objective:     Past Medical History:    Aortic valve calcification    UFCT    Arthritis    right arm    Atypical ductal hyperplasia of left breast    Cataracts, bilateral    small    Fibrocystic breast changes of both breasts    Other and unspecified hyperlipidemia    Plantar fascial fibromatosis    PONV (postoperative nausea and vomiting)    Unspecified essential hypertension              Past Surgical History:   Procedure Laterality Date    Biopsy of breast, incisional  01/01/2007    right-negative    Breast biopsy  07/22/2015    left breast biopsy.     Femur/knee surg unlisted  01/01/1985    Right knee, cartilage    Jenn biopsy stereo nodule 1 site right (cpt=19081)  02/2017    benign-hyalinized duct    Jenn biopsy stereo nodule 2 site bilat (cpt=19081/63941)  06/2015    atypia    Jenn localization wire 1 site left (cpt=19281)  07/2015    atypia    Needle biopsy right  2008    benign.    Oct, retina - os - left eye      Punc/aspir breast cyst  01/01/2008                No pertinent social history.            Review of Systems    Positive for stated complaint: fall/right shoulder  Other systems are as noted in HPI.  Constitutional and vital signs reviewed.       All other systems reviewed and negative except as noted above.                  Physical Exam     ED Triage Vitals [05/13/25 1428]   /58   Pulse 74   Resp 16   Temp 98 °F (36.7 °C)   Temp src Oral   SpO2 100 %   O2 Device None (Room air)       Current Vitals:   Vital Signs  BP: 123/58  Pulse: 74  Resp: 16  Temp: 98 °F (36.7 °C)  Temp src: Oral    Oxygen Therapy  SpO2: 100 %  O2 Device: None (Room air)          Physical Exam   GENERAL: Patient resting comfortably on the cart in no acute distress.  HEENT: Extraocular muscles intact, pupils equal round reactive to light and accommodation.  Mouth normal, neck supple, no meningismus.  LUNGS: Lungs clear to auscultation bilaterally.  CARDIOVASCULAR: + S1-S2, regular rate and rhythm, no murmurs.  BACK: No CVA tenderness, no midline bony tenderness.  ABDOMEN: + Bowel sounds, soft, nontender, nondistended.  No rebound, no guarding, no hepatosplenomegaly.  EXTREMITIES: Mild tenderness anterior lateral aspect of the proximal right shoulder.  No obvious deformity.  Elbow good range of motion nontender.  Wrist nontender.  Strong .  Sensation tact light touch.  SKIN: No rash, good turgor.  Large ecchymosis right buttock region hip good range of motion pelvis stable, no bony tenderness, ambulatory.  NEURO: Patient answers questions appropriately.  No focal deficits appreciated.            ED Course   Labs Reviewed - No data to display       Results            Right shoulder         No acute osseous findings.  Osteoarthritis.   Independent reviewed by myself, no fracture     MDM      Patient stable during her stay in the immediate care.  Patient x-ray negative for fracture.  Patient declined x-ray of the hip.  Patient has been ambulatory.  Recommend follow-up evaluation primary physician.  Return if new or worse symptoms.  Ice, ibuprofen.  Activity as tolerated.  I did consider fracture, contusion, hip fracture, hip contusion.        Medical Decision  Making      Disposition and Plan     Clinical Impression:  1. Contusion of right shoulder, initial encounter    2. Contusion of right hip, initial encounter         Disposition:  Discharge  5/13/2025  3:25 pm    Follow-up:  He Cheema MD  [address] [place] [id number]  [phone]    In 3 days            Medications Prescribed:  Discharge Medication List as of 5/13/2025  3:29 PM                Supplementary Documentation:

## 2025-05-13 NOTE — DISCHARGE INSTRUCTIONS
Follow-up for further evaluation primary physician.  Return if new or worse symptoms.  Ice, ibuprofen.  Activity as tolerated.   Subjective:   Beatrice Neil is a [de-identified] y.o. male      Chief Complaint   Patient presents with    Hypertension     follow up        History of present illness:  Mr. Prasad Bermudez returns in follow up. He saw Dr. Kenneth Newsome within the last month and had his Sinemet increased to two four times a day. He's been stable with regard to his Parkinson's. He's not been exercising as regularly because he's not driving anymore and is dependent upon other people to get him to the gym, but he's still making it to the gym several times a week. He denies chest pain. He denies shortness of breath. He denies current GI or  symptoms. Neurologically he seems stable as well. He continues his Coumadin on a regular basis and is due for an INR today. He'll be due his comprehensive examination in December. Overall I think he's stable and would not change anything at this point. He is due flu shot today.         Patient Active Problem List    Diagnosis Date Noted    [facility]'s disease [facility] 01/30/2015     Priority: 1 - One    Hypertension      Priority: 1 - One    Stroke ([facility]      Priority: 1 - One    MCI (mild cognitive impairment)      Priority: 1 - One    Aaron esophagus      Priority: 2 - Two    Hyperlipidemia      Priority: 2 - Two    Coronary artery disease 07/12/2017     Priority: 2 - Two    CKD (chronic kidney disease) stage 2, GFR 60-89 ml/min      Priority: 2 - Two    Long-term current use of high risk medication other than anticoagulant 06/13/2018     Priority: 3 - Three    Gastroesophageal reflux disease without esophagitis 09/30/2017     Priority: 3 - Three    Depression      Priority: 3 - Three    Tendinitis of left rotator cuff 07/12/2017     Priority: 3 - Three    DJD (degenerative joint disease) 07/12/2017     Priority: 3 - Three    Tremor 07/12/2017     Priority: 3 - Three    Snoring      Priority: 4 - Four    Sleep apnea in adult 07/12/2017     Priority: 4 - Four    Tendinitis 07/12/2017 Priority: 4 - Four    Gout 07/12/2017     Priority: 4 - Four    Restless leg syndrome 07/12/2017     Priority: 4 - Four    Benign nodular prostatic hyperplasia without lower urinary tract symptoms 07/12/2017     Priority: 4 - Four    Lumbar stenosis 01/29/2015     Priority: 4 - Four    Hearing difficulty of both ears 07/12/2017     Priority: 5 - Five    PE (physical exam), annual 09/30/2017      Past Surgical History:   Procedure Laterality Date    HX ORTHOPAEDIC  1/27/15    L4-L5 MICRODECOMPRESSION (Right    IA EGD TRANSORAL BIOPSY SINGLE/MULTIPLE  8/16/2011         IA EGD TRANSORAL BIOPSY SINGLE/MULTIPLE  10/1/2013           No Known Allergies   Family History   Problem Relation Age of Onset    Heart Disease Mother     Heart Disease Father       Social History     Social History    Marital status:      Spouse name: N/A    Number of children: N/A    Years of education: N/A     Occupational History    Not on file. Social History Main Topics    Smoking status: Former Smoker    Smokeless tobacco: Never Used    Alcohol use No    Drug use: No    Sexual activity: Not on file     Other Topics Concern    Not on file     Social History Narrative     Outpatient Prescriptions Marked as Taking for the 10/1/18 encounter (Office Visit) with Jacolyn Spatz, MD   Medication Sig Dispense Refill    carbidopa-levodopa (SINEMET)  mg per tablet 2 p.o. 4 times daily  Indications: IDIOPATHIC PARKINSONISM 240 Tab 5    PARoxetine (PAXIL) 10 mg tablet TAKE 1 TABLET DAILY 90 Tab 3    amLODIPine (NORVASC) 10 mg tablet TAKE 1 TABLET DAILY 90 Tab 2    esomeprazole (NEXIUM) 20 mg capsule TAKE 1 CAPSULE DAILY 90 Cap 3    atorvastatin (LIPITOR) 40 mg tablet TAKE 1 TABLET DAILY 90 Tab 3    warfarin (COUMADIN) 5 mg tablet TAKE ONE-HALF (1/2) TABLET ON MONDAY, WEDNESDAY AND FRIDAY AND TAKE 1 TABLET ALL OTHER DAYS 120 Tab 3    gabapentin (NEURONTIN) 100 mg capsule Take 2 Caps by mouth two (2) times a day. 360 Cap 3    losartan (COZAAR) 50 mg tablet Take 1 Tab by mouth daily. 90 Tab 3    acetaminophen (TYLENOL EXTRA STRENGTH) 500 mg tablet Take 1,000 mg by mouth three (3) times daily as needed for Pain.  senna-docusate (SENNA-S) 8.6-50 mg per tablet Take 1 Tab by mouth daily as needed.  aspirin delayed-release 81 mg tablet Take  by mouth daily.  vitamin e (E GEMS) 1,000 unit capsule Take 2,000 Units by mouth daily.  MULTIVITAMIN PO Take 1 Tab by mouth daily. Review of Systems              Constitutional:  He denies fever, weight loss, sweats or fatigue. HEENT:  No blurred or double vision, headache or dizziness. No difficulty with swallowing, taste, speech or smell. Respiratory:  No cough, wheezing or shortness of breath. No sputum production. Cardiac:  Denies chest pain, palpitations, unexplained indigestion, syncope, edema, PND or orthopnea. GI:  No changes in bowel movements, no abdominal pain, no bloating, anorexia, nausea, vomiting or heartburn. :  No frequency or dysuria. Denies incontinence. Extremities:  No joint pain, stiffness or swelling. Skin:  No recent rashes or mole changes. Neurological:  No numbness, tingling, burning paresthesias or loss of motor strength. No syncope, dizziness, frequent headaches or memory loss. Objective:     Vitals:    10/01/18 1012   BP: 128/76   Pulse: (!) 58   SpO2: 97%   Weight: 188 lb (85.3 kg)   Height: 5' 4\" (1.626 m)   PainSc:   0 - No pain       Body mass index is 32.27 kg/(m^2). Physical Examination:              General Appearance:  Well-developed, well-nourished, no acute  distress. HEENT:     Ears:  The TMs and ear canals were clear. Eyes:  The pupillary responses were normal.  Extraocular muscle function intact. Lids and conjunctiva not injected. Neck:  Supple without thyromegaly or adenopathy. No JVD noted. Lungs:  Clear to auscultation and percussion.   Cardiac:  Regular rate and rhythm without murmur. GI: nontender w/o mass. Normal BS's. Extremities:  No clubbing, cyanosis or edema. Skin:  No rash or unusual mole changes noted. Neurological:  Grossly normal.            Assessment/Plan:   Impressions:      ICD-10-CM ICD-9-CM    1. Hypertension, unspecified type I10 401.9 AMB POC BASIC METABOLIC PANEL   2. Cerebrovascular accident (CVA), unspecified mechanism ([facility] I63.9 434.91 AMB POC BASIC METABOLIC PANEL      AMB POC PT/INR   3. Long-term current use of high risk medication other than anticoagulant Z79.899 V58.69 AMB POC COMPLETE CBC,AUTOMATED ENTER        Plan:  1. Continue present meds  2. Discussed lifestyle modifications including Na restriction, low carb/fat diet, weight reduction and exercise (at least a walking program). Follow-up Disposition:  Return in about 3 months (around 1/1/2019) for CPE.       Orders Placed This Encounter    AMB POC BASIC METABOLIC PANEL    AMB POC PT/INR    AMB POC COMPLETE CBC,AUTOMATED ENTER       Kentrell Mendoza MD

## 2025-05-13 NOTE — ED INITIAL ASSESSMENT (HPI)
Pt states she was walking into [facility] and pushing the door in when the door was being pushed out and fell backwards.  C/o pain to rt hip, and rt shoulder/arm pain.denies hitting head.

## 2025-05-14 ENCOUNTER — TELEPHONE (OUTPATIENT)
Dept: FAMILY MEDICINE CLINIC | Facility: CLINIC | Age: 89
End: 2025-05-14

## 2025-05-14 NOTE — TELEPHONE ENCOUNTER
Please review previous message and advise if okay to schedule in a  same day slot next week.Please advise

## 2025-05-14 NOTE — TELEPHONE ENCOUNTER
Patient took a fall Sunday morning at [facility].    Patient went to cardiologist Monday and they said she was anemic.     Yesterday:  Went to urgent care in [place]    Patient states she has a big bruise and the urgent care in [place] stated they probably wouldn't be able to see if they did an xray and that's why they wanted her to follow up with [facility] ( in 3 days). Please advise if we can fit her in. Last Office visit was December 2024 with [facility].

## 2025-05-20 ENCOUNTER — LAB ENCOUNTER (OUTPATIENT)
Dept: LAB | Age: 89
End: 2025-05-20
Attending: FAMILY MEDICINE
Payer: MEDICARE

## 2025-05-20 ENCOUNTER — OFFICE VISIT (OUTPATIENT)
Dept: FAMILY MEDICINE CLINIC | Facility: CLINIC | Age: 89
End: 2025-05-20
Payer: MEDICARE

## 2025-05-20 VITALS
WEIGHT: 174 LBS | HEART RATE: 71 BPM | BODY MASS INDEX: 32.85 KG/M2 | HEIGHT: 61 IN | RESPIRATION RATE: 15 BRPM | DIASTOLIC BLOOD PRESSURE: 68 MMHG | OXYGEN SATURATION: 99 % | SYSTOLIC BLOOD PRESSURE: 138 MMHG

## 2025-05-20 DIAGNOSIS — D64.9 ANEMIA, UNSPECIFIED TYPE: ICD-10-CM
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
## 2025-05-20 LAB
BASOPHILS # BLD AUTO: 0.04 X10(3) UL (ref 0–0.2)
BASOPHILS NFR BLD AUTO: 0.4 %
DEPRECATED HBV CORE AB SER IA-ACNC: 96 NG/ML (ref 50–306)
EOSINOPHIL # BLD AUTO: 0.07 X10(3) UL (ref 0–0.7)
EOSINOPHIL NFR BLD AUTO: 0.7 %
ERYTHROCYTE [DISTWIDTH] IN BLOOD BY AUTOMATED COUNT: 14.5 %
HCT VFR BLD AUTO: 26.2 % (ref 35–48)
HGB BLD-MCNC: 8.3 G/DL (ref 12–16)
IMM GRANULOCYTES # BLD AUTO: 0.15 X10(3) UL (ref 0–1)
IMM GRANULOCYTES NFR BLD: 1.4 %
LYMPHOCYTES # BLD AUTO: 1.94 X10(3) UL (ref 1–4)
LYMPHOCYTES NFR BLD AUTO: 18.4 %
MCH RBC QN AUTO: 29.1 PG (ref 26–34)
MCHC RBC AUTO-ENTMCNC: 31.7 G/DL (ref 31–37)
MCV RBC AUTO: 91.9 FL (ref 80–100)
MONOCYTES # BLD AUTO: 0.99 X10(3) UL (ref 0.1–1)
MONOCYTES NFR BLD AUTO: 9.4 %
NEUTROPHILS # BLD AUTO: 7.35 X10 (3) UL (ref 1.5–7.7)
NEUTROPHILS # BLD AUTO: 7.35 X10(3) UL (ref 1.5–7.7)
NEUTROPHILS NFR BLD AUTO: 69.7 %
PLATELET # BLD AUTO: 260 10(3)UL (ref 150–450)
RBC # BLD AUTO: 2.85 X10(6)UL (ref 3.8–5.3)
VIT B12 SERPL-MCNC: 345 PG/ML (ref 211–911)
WBC # BLD AUTO: 10.5 X10(3) UL (ref 4–11)

## 2025-05-20 PROCEDURE — 36415 COLL VENOUS BLD VENIPUNCTURE: CPT

## 2025-05-20 PROCEDURE — 82607 VITAMIN B-12: CPT

## 2025-05-20 PROCEDURE — 85025 COMPLETE CBC W/AUTO DIFF WBC: CPT

## 2025-05-20 PROCEDURE — 99213 OFFICE O/P EST LOW 20 MIN: CPT | Performed by: FAMILY MEDICINE

## 2025-05-20 PROCEDURE — 82728 ASSAY OF FERRITIN: CPT

## 2025-05-20 NOTE — PROGRESS NOTES
[facility] Progress Note    SUBJECTIVE: Jinny Ocampo 89 year old female is here today for   Chief Complaint   Patient presents with    Urgent Care F/u     Went to urgent care due to a fall 5/13. She did have xray of her right arm due to pain after fall but was wnl  Referred ro follow up with her pcp  Has large bruise and pain on her right gluteus and thigh -says her hip is fine and not bothering her        Mother's Day wnt to [employer] and doors were shut, and door opened up against her and she fell backward, onto right hip buttocks and right shoulder. Xray of shoulder was negative.    Very bruised on right hip and buttock. Has been icing it, 5 x a day, taking tylenol and ibuprofen.    Improving, but still sore.    Had recently seen her cardiology Dr, Dr. Saini    Had aortic valve replacement and doing better    Has osteoarthritis and gets injections every 3 months.    Feeling tired more lately, potentially since     PMH  Past Medical History[1]     PSH  Past Surgical History[2]     Social Hx:  Lives alone    ROS  See HPI    OBJECTIVE:  /62   Pulse 71   Resp 15   Ht 5' 1\" (1.549 m)   Wt 174 lb (78.9 kg)   SpO2 99%   BMI 32.88 kg/m²     Exam  Ext: FROM at hip, signficant but fading hematoma on outer right leg    Labs:          Meds:   Current Medications[3]      Assessment/Plan  Jinny was seen today for urgent care f/u.    Diagnoses and all orders for this visit:    Fall, subsequent encounter    Anemia, unspecified type  -     Ferritin [E]; Future  -     Vitamin B12 [E]; Future  -     CBC W Differential W Platelet [E]; Future         Had anemia chronically has been worked up some, will check labs, no ferritin check prior, and MCV normal.          Total Time spent with patient and coordinating care:  15 minutes.    Follow up: as needed      He Cheema MD         [1]   Past Medical History:   Aortic valve calcification    UFCT    Arthritis    right arm    Atypical ductal hyperplasia of left  breast    Cataracts, bilateral    small    Fibrocystic breast changes of both breasts    Other and unspecified hyperlipidemia    Plantar fascial fibromatosis    PONV (postoperative nausea and vomiting)    Unspecified essential hypertension   [2]   Past Surgical History:  Procedure Laterality Date    Biopsy of breast, incisional  01/01/2007    right-negative    Breast biopsy  07/22/2015    left breast biopsy.     Femur/knee surg unlisted  01/01/1985    Right knee, cartilage    Jenn biopsy stereo nodule 1 site right (cpt=19081)  02/2017    benign-hyalinized duct    Jenn biopsy stereo nodule 2 site bilat (cpt=19081/47138)  06/2015    atypia    Jenn localization wire 1 site left (cpt=19281)  07/2015    atypia    Needle biopsy right  2008    benign.    Oct, retina - os - left eye      Punc/aspir breast cyst  01/01/2008   [3]   Current Outpatient Medications   Medication Sig Dispense Refill    losartan 25 MG Oral Tab losartan 25 mg tablet, [RxNorm: 609705]      HYDROCHLOROTHIAZIDE 25 MG Oral Tab TAKE 2 TABLETS(50 MG) BY MOUTH DAILY 180 tablet 0    aspirin 81 MG Oral Chew Tab Chew 1 tablet (81 mg total) by mouth daily. 30 tablet 11    ATORVASTATIN 40 MG Oral Tab TAKE 1 TABLET(40 MG) BY MOUTH EVERY NIGHT 90 tablet 3    latanoprost 0.005 % Ophthalmic Solution 1 drop nightly. AT BEDTIME      ibuprofen 200 MG Oral Tab Take 1 tablet (200 mg total) by mouth every 6 (six) hours as needed for Pain.      acetaminophen 500 MG Oral Tab Take 1 tablet (500 mg total) by mouth.

## 2025-06-15 DIAGNOSIS — I10 ESSENTIAL HYPERTENSION: ICD-10-CM

## 2025-06-15 NOTE — PROGRESS NOTES
Prelim    #23 Chelsy 3 Resilia aortic valve via right CFA - excellent result    Perclose femoral vessels    Updated patient's son immediately post procedure      Rk/Estelle/Jesse/Angel   15-Ata-2025 17:10

## 2025-06-16 RX ORDER — HYDROCHLOROTHIAZIDE 25 MG/1
50 TABLET ORAL DAILY
Qty: 180 TABLET | Refills: 0 | Status: SHIPPED | OUTPATIENT
Start: 2025-06-16

## 2025-06-26 ENCOUNTER — TELEPHONE (OUTPATIENT)
Dept: FAMILY MEDICINE CLINIC | Facility: CLINIC | Age: 89
End: 2025-06-26

## 2025-06-26 DIAGNOSIS — D64.9 LOW HEMOGLOBIN: Primary | ICD-10-CM

## 2025-06-26 NOTE — TELEPHONE ENCOUNTER
Patient calling if there is a lab order as per  lab notes on 5/20/25 request recheck on [facility]    Patient states she has been taking the iron and B12 as recommended    Please advise and let patient know when test ordered.

## 2025-06-30 DIAGNOSIS — E78.00 PURE HYPERCHOLESTEROLEMIA: ICD-10-CM

## 2025-06-30 RX ORDER — ATORVASTATIN CALCIUM 40 MG/1
40 TABLET, FILM COATED ORAL NIGHTLY
Qty: 90 TABLET | Refills: 3 | Status: SHIPPED | OUTPATIENT
Start: 2025-06-30

## 2025-07-03 ENCOUNTER — LAB ENCOUNTER (OUTPATIENT)
Dept: LAB | Age: 89
End: 2025-07-03
Attending: FAMILY MEDICINE
Payer: MEDICARE

## 2025-07-03 DIAGNOSIS — D64.9 LOW HEMOGLOBIN: ICD-10-CM

## 2025-07-03 LAB
BASOPHILS # BLD: 0 X10(3) UL (ref 0–0.2)
BASOPHILS NFR BLD: 0 %
EOSINOPHIL # BLD: 0.15 X10(3) UL (ref 0–0.7)
EOSINOPHIL NFR BLD: 1 %
ERYTHROCYTE [DISTWIDTH] IN BLOOD BY AUTOMATED COUNT: 14.4 %
HCT VFR BLD AUTO: 34.2 % (ref 35–48)
HGB BLD-MCNC: 10.2 G/DL (ref 12–16)
LYMPHOCYTES NFR BLD: 1.95 X10(3) UL (ref 1–4)
LYMPHOCYTES NFR BLD: 13 %
MCH RBC QN AUTO: 27.8 PG (ref 26–34)
MCHC RBC AUTO-ENTMCNC: 29.8 G/DL (ref 31–37)
MCV RBC AUTO: 93.2 FL (ref 80–100)
MONOCYTES # BLD: 1.65 X10(3) UL (ref 0.1–1)
MONOCYTES NFR BLD: 11 %
MORPHOLOGY: NORMAL
NEUTROPHILS # BLD AUTO: 10.6 X10 (3) UL (ref 1.5–7.7)
NEUTROPHILS NFR BLD: 75 %
NEUTS HYPERSEG # BLD: 11.25 X10(3) UL (ref 1.5–7.7)
PLATELET # BLD AUTO: 204 10(3)UL (ref 150–450)
PLATELET MORPHOLOGY: NORMAL
RBC # BLD AUTO: 3.67 X10(6)UL (ref 3.8–5.3)
TOTAL CELLS COUNTED BLD: 100
WBC # BLD AUTO: 15 X10(3) UL (ref 4–11)

## 2025-07-03 PROCEDURE — 85027 COMPLETE CBC AUTOMATED: CPT

## 2025-07-03 PROCEDURE — 85007 BL SMEAR W/DIFF WBC COUNT: CPT

## 2025-07-03 PROCEDURE — 85025 COMPLETE CBC W/AUTO DIFF WBC: CPT

## 2025-07-03 PROCEDURE — 36415 COLL VENOUS BLD VENIPUNCTURE: CPT

## 2025-07-10 ENCOUNTER — RESULTS FOLLOW-UP (OUTPATIENT)
Dept: FAMILY MEDICINE CLINIC | Facility: CLINIC | Age: 89
End: 2025-07-10

## 2025-07-10 DIAGNOSIS — D72.829 LEUKOCYTOSIS, UNSPECIFIED TYPE: Primary | ICD-10-CM

## 2025-07-14 NOTE — TELEPHONE ENCOUNTER
See response from pt, I'm guessing no vs is needed but do you want to repeat her cbc at some point? Elevated WBC is what is prompted this, plz advise thx

## 2025-08-09 ENCOUNTER — LAB ENCOUNTER (OUTPATIENT)
Dept: LAB | Age: 89
End: 2025-08-09
Attending: FAMILY MEDICINE

## 2025-08-09 DIAGNOSIS — D72.829 LEUKOCYTOSIS, UNSPECIFIED TYPE: ICD-10-CM

## 2025-08-09 LAB
BASOPHILS # BLD AUTO: 0.03 X10(3) UL (ref 0–0.2)
BASOPHILS NFR BLD AUTO: 0.3 %
EOSINOPHIL # BLD AUTO: 0.07 X10(3) UL (ref 0–0.7)
EOSINOPHIL NFR BLD AUTO: 0.7 %
ERYTHROCYTE [DISTWIDTH] IN BLOOD BY AUTOMATED COUNT: 14.7 %
HCT VFR BLD AUTO: 34.6 % (ref 35–48)
HGB BLD-MCNC: 10.8 G/DL (ref 12–16)
IMM GRANULOCYTES # BLD AUTO: 0.18 X10(3) UL (ref 0–1)
IMM GRANULOCYTES NFR BLD: 1.9 %
LYMPHOCYTES # BLD AUTO: 1.98 X10(3) UL (ref 1–4)
LYMPHOCYTES NFR BLD AUTO: 20.5 %
MCH RBC QN AUTO: 28.2 PG (ref 26–34)
MCHC RBC AUTO-ENTMCNC: 31.2 G/DL (ref 31–37)
MCV RBC AUTO: 90.3 FL (ref 80–100)
MONOCYTES # BLD AUTO: 0.93 X10(3) UL (ref 0.1–1)
MONOCYTES NFR BLD AUTO: 9.6 %
NEUTROPHILS # BLD AUTO: 6.45 X10 (3) UL (ref 1.5–7.7)
NEUTROPHILS # BLD AUTO: 6.45 X10(3) UL (ref 1.5–7.7)
NEUTROPHILS NFR BLD AUTO: 67 %
PLATELET # BLD AUTO: 184 10(3)UL (ref 150–450)
RBC # BLD AUTO: 3.83 X10(6)UL (ref 3.8–5.3)
WBC # BLD AUTO: 9.6 X10(3) UL (ref 4–11)

## 2025-08-09 PROCEDURE — 36415 COLL VENOUS BLD VENIPUNCTURE: CPT

## 2025-08-09 PROCEDURE — 85025 COMPLETE CBC W/AUTO DIFF WBC: CPT

## 2025-08-17 ENCOUNTER — RESULTS FOLLOW-UP (OUTPATIENT)
Dept: FAMILY MEDICINE CLINIC | Facility: CLINIC | Age: 89
End: 2025-08-17

## (undated) DIAGNOSIS — I10 ESSENTIAL HYPERTENSION: ICD-10-CM

## (undated) DEVICE — SUT PERMA- 0 18IN FSL NABSRB BLK L30MM 3/8

## (undated) DEVICE — 3M™ TEGADERM™ TRANSPARENT FILM DRESSING FRAME STYLE, 1626W, 4 IN X 4-3/4 IN (10 CM X 12 CM), 50/CT 4CT/CASE: Brand: 3M™ TEGADERM™

## (undated) DEVICE — SYRINGE MED 30ML STD CLR PLAS LL TIP N CTRL

## (undated) DEVICE — TIBURON DRAPE TOWELS: Brand: CONVERTORS

## (undated) DEVICE — WIRE SENS XSM TIP L2.9CM 3.2CM SAVVYWIRE

## (undated) DEVICE — REM POLYHESIVE ADULT PATIENT RETURN ELECTRODE: Brand: VALLEYLAB

## (undated) DEVICE — ANGIOGRAPHIC CATHETER: Brand: IMPULSE™

## (undated) DEVICE — PINNACLE INTRODUCER SHEATH: Brand: PINNACLE

## (undated) DEVICE — PERCLOSE™ PROSTYLE™ SUTURE-MEDIATED CLOSURE AND REPAIR SYSTEM: Brand: PERCLOSE™ PROSTYLE™

## (undated) DEVICE — 1010 S-DRAPE TOWEL DRAPE 10/BX: Brand: STERI-DRAPE™

## (undated) DEVICE — GOWN,SIRUS,FABRIC-REINFORCED,X-LARGE: Brand: MEDLINE

## (undated) DEVICE — COVER,TABLE,44X90,STERILE: Brand: MEDLINE

## (undated) DEVICE — Device

## (undated) DEVICE — GWIRE PTFE 035X260CM FX 3M J

## (undated) DEVICE — GUIDEWIRE ANGIO PERIPH 0.035IN X 145CM STR

## (undated) DEVICE — PACK UNIVERSAL DRAPE

## (undated) DEVICE — ANGIOGRAPHIC CATHETER: Brand: EXPO™

## (undated) DEVICE — 3M™ IOBAN™ 2 ANTIMICROBIAL INCISE DRAPE 6651EZ: Brand: IOBAN™ 2

## (undated) DEVICE — SOLUTION PREP 26ML 0.7% POVACRYLEX 74% ISO

## (undated) DEVICE — GUIDEWIRE: Brand: AMPLATZ SUPER STIFF™

## (undated) DEVICE — PACK ANGIOGRAPHY CUSTOM

## (undated) DEVICE — SPONGE 4X4 10PK

## (undated) DEVICE — OR TOWEL, 17" X 26" STERILE, BLUE: Brand: PREMIERPRO

## (undated) DEVICE — 3M™ IOBAN™ 2 ANTIMICROBIAL INCISE DRAPE 6650EZ: Brand: IOBAN™ 2

## (undated) DEVICE — KIT MAINFOLD 3 PORT NAMIC CUST

## (undated) DEVICE — 3M™ BAIR HUGGER® UNDERBODY BLANKET, FULL ACCESS, 10 PER CASE 63500: Brand: BAIR HUGGER™

## (undated) DEVICE — PREMIERPRO LAP SPONGE 18"X18" STERILE, 5/PK: Brand: PREMIERPRO

## (undated) DEVICE — X-RAY DETECTABLE SPONGES,16 PLY: Brand: VISTEC

## (undated) DEVICE — ELECTRODE DEFIB AD SLD GEL MULTFUNC RADLUC

## (undated) DEVICE — FIXED CORE WIRE GUIDE SAFE-T-J, CURVED: Brand: COOK

## (undated) NOTE — LETTER
SUJIT Notifier: [email or web address]   [facility] Patient Name: Nevaeh Pinedo. Identification Number: [id number]      Advance Beneficiary Notice of Noncoverage (ABN)  NOTE:  If Medicare doesn’t pay for D.  Pap, Breast and or Pelvic Exam below, you may have to p ? OPTION 2. I want the D. listed above, but do not bill Medicare. You may ask to be paid now as I am responsible for payment. I cannot appeal if Medicare is not billed. ? OPTION 3. I don’t want the D. listed above.  I understand with this choice  I am no

## (undated) NOTE — LETTER
SUJIT Notifier: [employer]   [facility] Patient Name: Scooter Alston. Identification Number: [id number]      Advance Beneficiary Notice of Noncoverage (ABN)  NOTE:  If Medicare doesn’t pay for D pap smear, pelvic and breast below, you may have to pay. may ask to be paid now as I am responsible for payment. I cannot appeal if Medicare is not billed. ? OPTION 3. I don’t want the D. listed above.  I understand with this choice  I am not responsible for payment, and I cannot appeal to see if Medicare would

## (undated) NOTE — MR AVS SNAPSHOT
[facility]  [address] [facility] [address] [address] [phone]               Thank you for choosing us for your health care visit with CECELIA Arevalo. We are glad to serve you and happy to provide you with this summary of your visit.   Please he easier nose blowing. · Use comforter or blanket under head of bed to raise bed. · Follow up with primary care in about two weeks if not improving.         Allergies as of May 13, 2017     Amoxicillin Swelling    Swelling of tongue    Codeine     Derivativ Results of Recent Testing     STREP A ASSAY W/OPTIC      Component Value Standard Range & Units    STREP GRP A CUL-SCR negative Negative    Control Line Present with a clear background (yes/no) yes Yes/No    Kit Lot # [id number] Numeric    Kit Expiration D

## (undated) NOTE — MR AVS SNAPSHOT
[address]  [address] [facility] [address] [id number] [phone]               Thank you for choosing us for your health care visit with Vida Macedo MD.  We are glad to serve you and happy to provide you with this Commonly known as:  LIPITOR           Clobetasol Propionate 0.05 % Soln   Apply 1 mL topically 2 (two) times daily. To scalp   Commonly known as:  TEMOVATE           GLUCOSAMINE CHONDR 500 COMPLEX Caps   Twice a day for 2 months. Then reassess knee pain.

## (undated) NOTE — LETTER
03/15/22    Patient: Akilah Aiken  : 1935 Visit date: 3/8/2022    Dear  Denise Araujo MD    Thank you for referring Akilah Aiken to my practice. Please find my assessment and plan below.           Sincerely,       Christa Hernandez MD   CC: No Recipients

## (undated) NOTE — LETTER
22    Patient: Edinson Georges  : 1935 Visit date: 2022    Dear  Robert Arce MD    Thank you for referring Edinson Georges to my practice. Please find my assessment and plan below. History of Present Illness:    Patient notes improvement in symptoms after injection sclerotherapy  Previous injection sites are healing well  Patient underwent uneventful sclerotherapy today- separate procedure note is dictated     Assessment   No diagnosis found.       Plan     Patient will continue injection sclerotherapy for residual spider angiomata and varicose veins  Encourage use of support stockings, elevation, exercise, avoidance of prolonged standing  Follow up with me in 2 weeks           Sincerely,       Nora Watson MD   CC: No Recipients

## (undated) NOTE — LETTER
22    Patient: Junior Spring  : 1935 Visit date: 2022    Dear  Eddie Cornelius MD    Thank you for referring Junior Spring to my practice. Please find my assessment and plan below. History of Present Illness    Today, I am seeing this patient for follow up-81year-old female who is here today for left lower extremity Venaseal procedure for saphenofemoral incompetence and reflux    Jinny underwent the procedure on eventfully. She tolerated the procedure well. A procedure note is dictated.     Assessment   Varicose veins with pain  (primary encounter diagnosis)  Saphenofemoral venous reflux  Varicose veins with complications    Plan     Left lower extremity Venaseal procedure for greater saphenous vein incompetence and reflux  Patient will follow up with routine postprocedural ultrasound in 1 week  She will see me in 2 weeks for follow-up examination  Discharge instructions were given and patient has no questions at this time    Thank you for allowing me to participate in your patient's care         Sincerely,       Chrissy Méndez MD   CC: No Recipients

## (undated) NOTE — LETTER
22    Patient: Jasper Hannah  : 1935 Visit date: 2022    Dear  Holley Rey MD    Thank you for referring Jasper Hannah to my practice. Please find my assessment and plan below. History of Present Illness    Today, I am seeing this patient for follow up-Jinny is here today for Venaseal procedure of the right greater saphenous vein    After preprocedural scanning, access was obtained into the greater saphenous vein in the mid medial calf. A short segment of the greater saphenous vein below the knee was able to be treated. Multiple attempts to gain access into the other segments of the greater saphenous vein with limited by the patient's small caliber vein and tortuosity of the vein with multiple tributaries. The patient tolerated the procedure well a procedure note is dictated    Assessment   Varicose veins with pain  (primary encounter diagnosis)  Varicose veins of bilateral lower extremities with pain     Plan     Patient will follow up with me in 2 weeks for repeat examination.   The need for postprocedural sclerotherapy for residual varicosities was discussed as a short segment of the greater saphenous vein could be treated due to the caliber and tortuosity of the vein at    Thank you for allowing me to participate in your patient's care         Sincerely,       Kyle Vu MD   CC: No Recipients

## (undated) NOTE — MR AVS SNAPSHOT
[address]  [address] [id number] [phone]               Thank you for choosing us for your health care visit with Natasha Wilkins MD.  We are glad to serve you and happy to provide you with this Clobetasol Propionate 0.05 % Soln   Apply 1 mL topically 2 (two) times daily. To scalp   What changed:  Another medication with the same name was removed. Continue taking this medication, and follow the directions you see here.    Commonly known as:  Aure Cevallos

## (undated) NOTE — LETTER
21    Patient: Alicia Delgado  : 1935 Visit date: 2021    Dear  Denise Hammer MD    Thank you for referring Alicia Delgado to my practice. Please find my assessment and plan below.           Sincerely,       Dixie Talbot MD   CC

## (undated) NOTE — MR AVS SNAPSHOT
[address]  [address] [place] [id number] [phone]               Thank you for choosing us for your health care visit with Sander Zamudio MD.  We are glad to serve you and happy to provide you with this Take 1 tablet (25 mg total) by mouth daily.    Commonly known as:  HYDRODIURIL                Where to Get Your Medications      These medications were sent to [address], [address] [place] [address], [phone]

## (undated) NOTE — LETTER
22    Patient: John Benson  : 1935 Visit date: 6/3/2022    Dear  Claudene Junker, MD    Thank you for referring John Benson to my practice. Please find my assessment and plan below.           Sincerely,       Mena Soulier, MD   CC: No Recipients